# Patient Record
Sex: FEMALE
[De-identification: names, ages, dates, MRNs, and addresses within clinical notes are randomized per-mention and may not be internally consistent; named-entity substitution may affect disease eponyms.]

---

## 2019-05-23 ENCOUNTER — APPOINTMENT (OUTPATIENT)
Dept: GERIATRICS | Facility: CLINIC | Age: 84
End: 2019-05-23
Payer: MEDICARE

## 2019-05-23 VITALS
OXYGEN SATURATION: 97 % | BODY MASS INDEX: 24.03 KG/M2 | TEMPERATURE: 97.9 F | DIASTOLIC BLOOD PRESSURE: 70 MMHG | SYSTOLIC BLOOD PRESSURE: 120 MMHG | WEIGHT: 122.38 LBS | HEIGHT: 60 IN | RESPIRATION RATE: 16 BRPM | HEART RATE: 71 BPM

## 2019-05-23 DIAGNOSIS — R05 COUGH: ICD-10-CM

## 2019-05-23 DIAGNOSIS — Z80.9 FAMILY HISTORY OF MALIGNANT NEOPLASM, UNSPECIFIED: ICD-10-CM

## 2019-05-23 DIAGNOSIS — R29.6 REPEATED FALLS: ICD-10-CM

## 2019-05-23 DIAGNOSIS — Z86.39 PERSONAL HISTORY OF OTHER ENDOCRINE, NUTRITIONAL AND METABOLIC DISEASE: ICD-10-CM

## 2019-05-23 DIAGNOSIS — Z60.2 PROBLEMS RELATED TO LIVING ALONE: ICD-10-CM

## 2019-05-23 DIAGNOSIS — Z83.1 FAMILY HISTORY OF OTHER INFECTIOUS AND PARASITIC DISEASES: ICD-10-CM

## 2019-05-23 DIAGNOSIS — Z95.1 PRESENCE OF AORTOCORONARY BYPASS GRAFT: ICD-10-CM

## 2019-05-23 DIAGNOSIS — R55 SYNCOPE AND COLLAPSE: ICD-10-CM

## 2019-05-23 DIAGNOSIS — E78.5 HYPERLIPIDEMIA, UNSPECIFIED: ICD-10-CM

## 2019-05-23 PROBLEM — Z00.00 ENCOUNTER FOR PREVENTIVE HEALTH EXAMINATION: Status: ACTIVE | Noted: 2019-05-23

## 2019-05-23 PROCEDURE — 99204 OFFICE O/P NEW MOD 45 MIN: CPT

## 2019-05-23 RX ORDER — CHLORHEXIDINE GLUCONATE 4 %
325 (65 FE) LIQUID (ML) TOPICAL DAILY
Refills: 0 | Status: ACTIVE | COMMUNITY
Start: 2019-05-23

## 2019-05-23 SDOH — SOCIAL STABILITY - SOCIAL INSECURITY: PROBLEMS RELATED TO LIVING ALONE: Z60.2

## 2019-05-23 NOTE — SOCIAL HISTORY
[Two or more falls in past year] : Patient reported two or more falls in the past year [Fully functional (bathing, dressing, toileting, transferring, walking, feeding)] : Fully functional (bathing, dressing, toileting, transferring, walking, feeding) [Some assistance needed] : managing medications [Full assistance needed] : managing finances

## 2019-05-23 NOTE — HISTORY OF PRESENT ILLNESS
[FreeTextEntry1] : 89yoF presents with her dtr for initial visit.  Dtr reports increased lower ext edema with weeping of fluid.  she does not always her compression stockings.  currently dtr is providing dressing changes.  also report +productive cough x 4 days now taking cough medication, and tylenol extra strength sinus cold.. denies sore throat, fever or sob,   but endorses +rhinorrhea.  using her ventolin for cough.\par anemia: has been on iron for a very long time.\par frequent falls: using walker and wheelchair.\par spinal stenosis with chronic pain and usually wears neck brace.\par CAD with CABG and congestive heart failure.

## 2019-05-23 NOTE — REASON FOR VISIT
[Initial Evaluation] : an initial evaluation [Family Member] : family member [FreeTextEntry1] : edema leaking

## 2019-05-23 NOTE — ASSESSMENT
[FreeTextEntry1] : chf: volume overloaded with increase in lower ext edema, now with weeping wounds.  dtr will continue to provided daily to 2x daily dressing changes.  will double lasix to 40mg daily x 7 days.  recheck labwork today \par f/u K, remains currently on kcl 20meq daily.   leg elevation and compression/ace wraps.  f/u in 1 week\par cough:  likely viral URI.  improving.  stop tyelnol sinus cold.  advised to take tylenol extra strength and robitussin cough plain.  continue to monitor.  \par anemia:  f/u levels and iron studies.\par CAD s/p cabg:  c/w lowered dose of verapamil 120mg daily and statin.  she is allergic to asa.\par asthma:  mild intermittent:  c/w prn ventolin\par spinal stenosis: wears soft neck brace.  prn tyelnol and will further discuss cyclobenzaprine at next visit.\par unsteady gait: c/w walker/wheelchair\par hx of dm2: used to take insulin.  has peripheral neuropathy.  Now diet controlled\par referral to podiatry\par memory loss:  check vitamin levels and tsh \par next visit MMSE, ACP, depression screen\par

## 2019-05-23 NOTE — PHYSICAL EXAM
[General Appearance - Alert] : alert [General Appearance - In No Acute Distress] : in no acute distress [Sclera] : the sclera and conjunctiva were normal [Extraocular Movements] : extraocular movements were intact [No Oral Pallor] : no oral pallor [No Oral Cyanosis] : no oral cyanosis [Hearing Threshold Finger Rub Not Luna] : hearing was normal [Oropharynx] : The oropharynx was normal [Neck Appearance] : the appearance of the neck was normal [Neck Cervical Mass (___cm)] : no neck mass was observed [Respiration, Rhythm And Depth] : normal respiratory rhythm and effort [Exaggerated Use Of Accessory Muscles For Inspiration] : no accessory muscle use [Auscultation Breath Sounds / Voice Sounds] : lungs were clear to auscultation bilaterally [Heart Rate And Rhythm] : heart rate was normal and rhythm regular [Heart Sounds] : normal S1 and S2 [Breast Appearance] : normal in appearance [Bowel Sounds] : normal bowel sounds [Abdomen Soft] : soft [Abdomen Tenderness] : non-tender [No Spinal Tenderness] : no spinal tenderness [Nail Clubbing] : no clubbing  or cyanosis of the fingernails [Involuntary Movements] : no involuntary movements were seen [Skin Color & Pigmentation] : normal skin color and pigmentation [] : no rash [No Focal Deficits] : no focal deficits [Affect] : the affect was normal [Mood] : the mood was normal [FreeTextEntry1] : 2 open small wounds on the lower legs weeping serous fluid

## 2019-05-23 NOTE — REVIEW OF SYSTEMS
[Eyesight Problems] : eyesight problems [Lower Ext Edema] : lower extremity edema [As Noted in HPI] : as noted in HPI [Incontinence] : incontinence [Dizziness] : dizziness [Negative] : Integumentary [Fever] : no fever [Chills] : no chills [Loss Of Hearing] : no hearing loss [Constipation] : no constipation [Diarrhea] : no diarrhea [Sleep Disturbances] : no sleep disturbances [Anxiety] : no anxiety [Depression] : no depression [Easy Bleeding] : no tendency for easy bleeding [Easy Bruising] : no tendency for easy bruising [FreeTextEntry9] : spinal stenosis with enck pain

## 2019-05-24 LAB
ALBUMIN SERPL ELPH-MCNC: 3.1 G/DL
ALP BLD-CCNC: 83 U/L
ALT SERPL-CCNC: 12 U/L
ANION GAP SERPL CALC-SCNC: 9 MMOL/L
AST SERPL-CCNC: 11 U/L
BASOPHILS # BLD AUTO: 0.05 K/UL
BASOPHILS NFR BLD AUTO: 0.5 %
BILIRUB SERPL-MCNC: <0.2 MG/DL
BUN SERPL-MCNC: 27 MG/DL
CALCIUM SERPL-MCNC: 8.9 MG/DL
CHLORIDE SERPL-SCNC: 111 MMOL/L
CO2 SERPL-SCNC: 22 MMOL/L
CREAT SERPL-MCNC: 0.78 MG/DL
EOSINOPHIL # BLD AUTO: 0.18 K/UL
EOSINOPHIL NFR BLD AUTO: 1.9 %
ESTIMATED AVERAGE GLUCOSE: 94 MG/DL
FERRITIN SERPL-MCNC: 27 NG/ML
FOLATE SERPL-MCNC: 11 NG/ML
GLUCOSE SERPL-MCNC: 130 MG/DL
HBA1C MFR BLD HPLC: 4.9 %
HCT VFR BLD CALC: 29.7 %
HGB BLD-MCNC: 8.5 G/DL
IMM GRANULOCYTES NFR BLD AUTO: 0.5 %
IRON SATN MFR SERPL: 9 %
IRON SERPL-MCNC: 21 UG/DL
LYMPHOCYTES # BLD AUTO: 0.71 K/UL
LYMPHOCYTES NFR BLD AUTO: 7.6 %
MAGNESIUM SERPL-MCNC: 2.3 MG/DL
MAN DIFF?: NORMAL
MCHC RBC-ENTMCNC: 28.3 PG
MCHC RBC-ENTMCNC: 28.6 GM/DL
MCV RBC AUTO: 99 FL
MONOCYTES # BLD AUTO: 0.76 K/UL
MONOCYTES NFR BLD AUTO: 8.1 %
NEUTROPHILS # BLD AUTO: 7.61 K/UL
NEUTROPHILS NFR BLD AUTO: 81.4 %
PLATELET # BLD AUTO: 426 K/UL
POTASSIUM SERPL-SCNC: 4.8 MMOL/L
PROT SERPL-MCNC: 5.4 G/DL
RBC # BLD: 3 M/UL
RBC # FLD: 16.6 %
SODIUM SERPL-SCNC: 142 MMOL/L
TIBC SERPL-MCNC: 233 UG/DL
TSH SERPL-ACNC: 2.04 UIU/ML
UIBC SERPL-MCNC: 212 UG/DL
VIT B12 SERPL-MCNC: 436 PG/ML
WBC # FLD AUTO: 9.36 K/UL

## 2019-05-29 ENCOUNTER — APPOINTMENT (OUTPATIENT)
Dept: GERIATRICS | Facility: CLINIC | Age: 84
End: 2019-05-29
Payer: MEDICARE

## 2019-05-29 VITALS
HEIGHT: 60 IN | BODY MASS INDEX: 23.63 KG/M2 | SYSTOLIC BLOOD PRESSURE: 110 MMHG | WEIGHT: 120.38 LBS | OXYGEN SATURATION: 97 % | RESPIRATION RATE: 15 BRPM | DIASTOLIC BLOOD PRESSURE: 70 MMHG | TEMPERATURE: 97.8 F | HEART RATE: 75 BPM

## 2019-05-29 PROCEDURE — 99214 OFFICE O/P EST MOD 30 MIN: CPT

## 2019-05-29 NOTE — ASSESSMENT
[FreeTextEntry1] : acp:  hcp and molst completed, DNR/DNI, yuni bring in copy at next visit:  Her dtr Evy Tolentinosmileyjacob [phone] is listed.\par chf:  cmp reviewed and stable. leg edema slightly improved, now longer weeping, compression stockings. return to lasix 20mg daily, c/w potassium supplement.  leg elevation and compression/ace wraps. \par cough: trial of zyrtec for possible allergy; continue to monitor. \par anemia: c/w iron supplement\par CAD s/p cabg: c/w lowered dose of verapamil 120mg daily and statin. she is allergic to asa.\par asthma: mild intermittent: c/w prn ventolin.  \par spinal stenosis: wears soft neck brace. prn tyelnol and very rarely takes cyclobenzaprine, would consider stopping in future for beers criteria\par unsteady gait: c/w walker/wheelchair\par hx of dm2: used to take insulin. has peripheral neuropathy. Now diet controlled\par memory loss: tsh wnl.  mmse: 14/29.  however patient with minimal effort for serial 7's and world. suspect falsely low due to this.  will repeat in 6 months.

## 2019-05-29 NOTE — REVIEW OF SYSTEMS
[Eyesight Problems] : eyesight problems [As Noted in HPI] : as noted in HPI [Loss Of Hearing] : hearing loss [Negative] : Cardiovascular

## 2019-05-29 NOTE — PHYSICAL EXAM
[Total Score ___ / 30] : the patient achieved a score of [unfilled] /30 [Date / Time ___ / 5] : date / time [unfilled] / 5 [Place ___ / 5] : place [unfilled] / 5 [Registration ___ / 3] : registration [unfilled] / 3 [Serial Sevens ___/5] : serial sevens [unfilled] / 5 [Naming 2 Objects ___ / 2] : naming two objects [unfilled] / 2 [Repeating a Sentence ___ / 1] : repeating a sentence [unfilled] / 1 [Writing a Sentence ___ / 1] : write sentence [unfilled] / 1 [3-stage Verbal Command ___ / 3] : three-stage verbal command [unfilled] / 3 [Written Command ___ / 1] : written command [unfilled] / 1 [Copy a Design ___ / 1] : copy a design [unfilled] / 1 [Recall ___ / 3] : recall [unfilled] / 3 [General Appearance - Alert] : alert [Sclera] : the sclera and conjunctiva were normal [General Appearance - In No Acute Distress] : in no acute distress [Extraocular Movements] : extraocular movements were intact [No Oral Cyanosis] : no oral cyanosis [Neck Appearance] : the appearance of the neck was normal [Exaggerated Use Of Accessory Muscles For Inspiration] : no accessory muscle use [] : no respiratory distress [Respiration, Rhythm And Depth] : normal respiratory rhythm and effort [Auscultation Breath Sounds / Voice Sounds] : lungs were clear to auscultation bilaterally [Nail Clubbing] : no clubbing  or cyanosis of the fingernails [Involuntary Movements] : no involuntary movements were seen [Skin Color & Pigmentation] : normal skin color and pigmentation [FreeTextEntry1] : small wound healing without drainage or bleeding

## 2019-05-29 NOTE — HISTORY OF PRESENT ILLNESS
[0] : 2) Feeling down, depressed, or hopeless: Not at all [FreeTextEntry1] : seen for follow up with her dtr for her lower ext edema\par \par with doubling of lasix last week, reports edema has improved, and now leg wounds no longer weeping.  and the two small wounds have developed scabs.  no pain or redness. wears compression stockings and elevates legs.\par \par remains with some coughing and rhinorrhea.  but no progression. no sob\par \par bloodwork from last week reviewed, normal electrolytes.  \par \par

## 2019-06-24 ENCOUNTER — INPATIENT (INPATIENT)
Facility: HOSPITAL | Age: 84
LOS: 3 days | Discharge: NURSING, REHAB OR LONG-TERM CARE | DRG: 481 | End: 2019-06-28
Attending: ORTHOPAEDIC SURGERY | Admitting: ORTHOPAEDIC SURGERY
Payer: MEDICARE

## 2019-06-24 VITALS
DIASTOLIC BLOOD PRESSURE: 71 MMHG | OXYGEN SATURATION: 95 % | HEART RATE: 73 BPM | TEMPERATURE: 98 F | SYSTOLIC BLOOD PRESSURE: 157 MMHG | HEIGHT: 69 IN | WEIGHT: 121.92 LBS | RESPIRATION RATE: 18 BRPM

## 2019-06-24 DIAGNOSIS — S72.90XA UNSPECIFIED FRACTURE OF UNSPECIFIED FEMUR, INITIAL ENCOUNTER FOR CLOSED FRACTURE: ICD-10-CM

## 2019-06-24 LAB
ALBUMIN SERPL ELPH-MCNC: 3.5 G/DL — SIGNIFICANT CHANGE UP (ref 3.3–5)
ALP SERPL-CCNC: 81 U/L — SIGNIFICANT CHANGE UP (ref 40–120)
ALT FLD-CCNC: 14 U/L — SIGNIFICANT CHANGE UP (ref 10–45)
ANION GAP SERPL CALC-SCNC: 13 MMOL/L — SIGNIFICANT CHANGE UP (ref 5–17)
APTT BLD: 24.8 SEC — LOW (ref 27.5–36.3)
AST SERPL-CCNC: 15 U/L — SIGNIFICANT CHANGE UP (ref 10–40)
BASOPHILS # BLD AUTO: 0 K/UL — SIGNIFICANT CHANGE UP (ref 0–0.2)
BASOPHILS NFR BLD AUTO: 0.2 % — SIGNIFICANT CHANGE UP (ref 0–2)
BILIRUB SERPL-MCNC: 0.2 MG/DL — SIGNIFICANT CHANGE UP (ref 0.2–1.2)
BLD GP AB SCN SERPL QL: NEGATIVE — SIGNIFICANT CHANGE UP
BUN SERPL-MCNC: 30 MG/DL — HIGH (ref 7–23)
CALCIUM SERPL-MCNC: 9.1 MG/DL — SIGNIFICANT CHANGE UP (ref 8.4–10.5)
CHLORIDE SERPL-SCNC: 107 MMOL/L — SIGNIFICANT CHANGE UP (ref 96–108)
CO2 SERPL-SCNC: 21 MMOL/L — LOW (ref 22–31)
CREAT SERPL-MCNC: 0.82 MG/DL — SIGNIFICANT CHANGE UP (ref 0.5–1.3)
EOSINOPHIL # BLD AUTO: 0.2 K/UL — SIGNIFICANT CHANGE UP (ref 0–0.5)
EOSINOPHIL NFR BLD AUTO: 2.5 % — SIGNIFICANT CHANGE UP (ref 0–6)
GLUCOSE SERPL-MCNC: 159 MG/DL — HIGH (ref 70–99)
HCT VFR BLD CALC: 30.3 % — LOW (ref 34.5–45)
HGB BLD-MCNC: 9.6 G/DL — LOW (ref 11.5–15.5)
INR BLD: 0.95 RATIO — SIGNIFICANT CHANGE UP (ref 0.88–1.16)
LYMPHOCYTES # BLD AUTO: 0.8 K/UL — LOW (ref 1–3.3)
LYMPHOCYTES # BLD AUTO: 8.5 % — LOW (ref 13–44)
MCHC RBC-ENTMCNC: 28.5 PG — SIGNIFICANT CHANGE UP (ref 27–34)
MCHC RBC-ENTMCNC: 31.6 GM/DL — LOW (ref 32–36)
MCV RBC AUTO: 90.4 FL — SIGNIFICANT CHANGE UP (ref 80–100)
MONOCYTES # BLD AUTO: 0.9 K/UL — SIGNIFICANT CHANGE UP (ref 0–0.9)
MONOCYTES NFR BLD AUTO: 9.7 % — SIGNIFICANT CHANGE UP (ref 2–14)
NEUTROPHILS # BLD AUTO: 7.6 K/UL — HIGH (ref 1.8–7.4)
NEUTROPHILS NFR BLD AUTO: 79.1 % — HIGH (ref 43–77)
PLATELET # BLD AUTO: 281 K/UL — SIGNIFICANT CHANGE UP (ref 150–400)
POTASSIUM SERPL-MCNC: 3.9 MMOL/L — SIGNIFICANT CHANGE UP (ref 3.5–5.3)
POTASSIUM SERPL-SCNC: 3.9 MMOL/L — SIGNIFICANT CHANGE UP (ref 3.5–5.3)
PROT SERPL-MCNC: 5.8 G/DL — LOW (ref 6–8.3)
PROTHROM AB SERPL-ACNC: 10.9 SEC — SIGNIFICANT CHANGE UP (ref 10–12.9)
RBC # BLD: 3.35 M/UL — LOW (ref 3.8–5.2)
RBC # FLD: 14.4 % — SIGNIFICANT CHANGE UP (ref 10.3–14.5)
RH IG SCN BLD-IMP: POSITIVE — SIGNIFICANT CHANGE UP
SODIUM SERPL-SCNC: 141 MMOL/L — SIGNIFICANT CHANGE UP (ref 135–145)
WBC # BLD: 9.6 K/UL — SIGNIFICANT CHANGE UP (ref 3.8–10.5)
WBC # FLD AUTO: 9.6 K/UL — SIGNIFICANT CHANGE UP (ref 3.8–10.5)

## 2019-06-24 PROCEDURE — 72125 CT NECK SPINE W/O DYE: CPT | Mod: 26

## 2019-06-24 PROCEDURE — 73552 X-RAY EXAM OF FEMUR 2/>: CPT | Mod: 26,RT

## 2019-06-24 PROCEDURE — 72192 CT PELVIS W/O DYE: CPT | Mod: 26

## 2019-06-24 PROCEDURE — 71045 X-RAY EXAM CHEST 1 VIEW: CPT | Mod: 26

## 2019-06-24 PROCEDURE — 73080 X-RAY EXAM OF ELBOW: CPT | Mod: 26,RT

## 2019-06-24 PROCEDURE — 73502 X-RAY EXAM HIP UNI 2-3 VIEWS: CPT | Mod: 26,RT

## 2019-06-24 PROCEDURE — 99285 EMERGENCY DEPT VISIT HI MDM: CPT

## 2019-06-24 PROCEDURE — 76377 3D RENDER W/INTRP POSTPROCES: CPT | Mod: 26

## 2019-06-24 PROCEDURE — 70450 CT HEAD/BRAIN W/O DYE: CPT | Mod: 26

## 2019-06-24 RX ORDER — VERAPAMIL HCL 240 MG
120 CAPSULE, EXTENDED RELEASE PELLETS 24 HR ORAL DAILY
Refills: 0 | Status: DISCONTINUED | OUTPATIENT
Start: 2019-06-24 | End: 2019-06-28

## 2019-06-24 RX ORDER — ACETAMINOPHEN 500 MG
650 TABLET ORAL EVERY 6 HOURS
Refills: 0 | Status: DISCONTINUED | OUTPATIENT
Start: 2019-06-24 | End: 2019-06-24

## 2019-06-24 RX ORDER — ALBUTEROL 90 UG/1
2.5 AEROSOL, METERED ORAL EVERY 6 HOURS
Refills: 0 | Status: DISCONTINUED | OUTPATIENT
Start: 2019-06-24 | End: 2019-06-25

## 2019-06-24 RX ORDER — OXYCODONE HYDROCHLORIDE 5 MG/1
2.5 TABLET ORAL EVERY 4 HOURS
Refills: 0 | Status: DISCONTINUED | OUTPATIENT
Start: 2019-06-24 | End: 2019-06-28

## 2019-06-24 RX ORDER — SENNA PLUS 8.6 MG/1
2 TABLET ORAL AT BEDTIME
Refills: 0 | Status: DISCONTINUED | OUTPATIENT
Start: 2019-06-24 | End: 2019-06-28

## 2019-06-24 RX ORDER — OXYCODONE HYDROCHLORIDE 5 MG/1
5 TABLET ORAL EVERY 4 HOURS
Refills: 0 | Status: DISCONTINUED | OUTPATIENT
Start: 2019-06-24 | End: 2019-06-28

## 2019-06-24 RX ORDER — ACETAMINOPHEN 500 MG
975 TABLET ORAL EVERY 8 HOURS
Refills: 0 | Status: DISCONTINUED | OUTPATIENT
Start: 2019-06-24 | End: 2019-06-28

## 2019-06-24 RX ORDER — MAGNESIUM HYDROXIDE 400 MG/1
30 TABLET, CHEWABLE ORAL DAILY
Refills: 0 | Status: DISCONTINUED | OUTPATIENT
Start: 2019-06-24 | End: 2019-06-28

## 2019-06-24 RX ORDER — ALBUTEROL 90 UG/1
2.5 AEROSOL, METERED ORAL EVERY 6 HOURS
Refills: 0 | Status: DISCONTINUED | OUTPATIENT
Start: 2019-06-24 | End: 2019-06-24

## 2019-06-24 RX ORDER — DEXTROSE MONOHYDRATE, SODIUM CHLORIDE, AND POTASSIUM CHLORIDE 50; .745; 4.5 G/1000ML; G/1000ML; G/1000ML
1000 INJECTION, SOLUTION INTRAVENOUS
Refills: 0 | Status: DISCONTINUED | OUTPATIENT
Start: 2019-06-24 | End: 2019-06-25

## 2019-06-24 RX ORDER — DOCUSATE SODIUM 100 MG
100 CAPSULE ORAL THREE TIMES A DAY
Refills: 0 | Status: DISCONTINUED | OUTPATIENT
Start: 2019-06-24 | End: 2019-06-28

## 2019-06-24 RX ORDER — VERAPAMIL HCL 240 MG
120 CAPSULE, EXTENDED RELEASE PELLETS 24 HR ORAL DAILY
Refills: 0 | Status: DISCONTINUED | OUTPATIENT
Start: 2019-06-24 | End: 2019-06-24

## 2019-06-24 RX ORDER — FUROSEMIDE 40 MG
20 TABLET ORAL
Refills: 0 | Status: DISCONTINUED | OUTPATIENT
Start: 2019-06-24 | End: 2019-06-25

## 2019-06-24 RX ORDER — ATORVASTATIN CALCIUM 80 MG/1
40 TABLET, FILM COATED ORAL AT BEDTIME
Refills: 0 | Status: DISCONTINUED | OUTPATIENT
Start: 2019-06-24 | End: 2019-06-25

## 2019-06-24 RX ORDER — MORPHINE SULFATE 50 MG/1
1 CAPSULE, EXTENDED RELEASE ORAL ONCE
Refills: 0 | Status: DISCONTINUED | OUTPATIENT
Start: 2019-06-24 | End: 2019-06-24

## 2019-06-24 RX ADMIN — Medication 650 MILLIGRAM(S): at 21:57

## 2019-06-24 RX ADMIN — MORPHINE SULFATE 1 MILLIGRAM(S): 50 CAPSULE, EXTENDED RELEASE ORAL at 21:58

## 2019-06-24 RX ADMIN — MORPHINE SULFATE 1 MILLIGRAM(S): 50 CAPSULE, EXTENDED RELEASE ORAL at 21:16

## 2019-06-24 NOTE — ED ADULT NURSE NOTE - NSIMPLEMENTINTERV_GEN_ALL_ED
Implemented All Fall with Harm Risk Interventions:  [place] to call system. Call bell, personal items and telephone within reach. Instruct patient to call for assistance. Room bathroom lighting operational. Non-slip footwear when patient is off stretcher. Physically safe environment: no spills, clutter or unnecessary equipment. Stretcher in lowest position, wheels locked, appropriate side rails in place. Provide visual cue, wrist band, yellow gown, etc. Monitor gait and stability. Monitor for mental status changes and reorient to person, place, and time. Review medications for side effects contributing to fall risk. Reinforce activity limits and safety measures with patient and family. Provide visual clues: red socks.

## 2019-06-24 NOTE — ED PROVIDER NOTE - CLINICAL SUMMARY MEDICAL DECISION MAKING FREE TEXT BOX
Attending Odessa Baker: 89 y/o female presenting after mechanical fall with right hip pain. on exam pt with ecchymoses to forehead and right hip pain. ct head performed which was negative for acute traumatic injury. pt with abrasion to right elbow without evidence of laceration. will provide wound care. concern for hip fracture based on pain and exam. xrays ordered showing right intertrochanteric fracture. neurovascular intact. will d/w ortho and likely admit. pain control

## 2019-06-24 NOTE — ED PROVIDER NOTE - ATTENDING CONTRIBUTION TO CARE
Attending MD Odessa Baker:  I personally have seen and examined this patient.  Resident note reviewed and agree on plan of care and except where noted.  See HPI, PE, and MDM for details.

## 2019-06-24 NOTE — H&P ADULT - ASSESSMENT
89y Female with R intertroch fracture  - Pain control  - NPO/IVF  - CBC/BMP/Coags/UA/T+S x2  - EKG/CXR  - Medical clearance  - Plan for OR for ORIF R hip

## 2019-06-24 NOTE — ED ADULT NURSE NOTE - OBJECTIVE STATEMENT
89 yr old female has been having multiple falls and today she fell again after stating her legs didn't feel right all day. r leg is shortened and rotated. on assessment a and o x 3 lungs clear abd soft non tender some swelling in both legs but this is normal for her and on water pills for it. has a bruise on forehead and a skin tear on r forearm.

## 2019-06-24 NOTE — ED PROVIDER NOTE - CARE PLAN
Principal Discharge DX:	Femur fracture Principal Discharge DX:	Femur fracture  Goal:	right intertrochanteric fracture

## 2019-06-24 NOTE — ED PROVIDER NOTE - PHYSICAL EXAMINATION
Attending Odessa Baker: Gen: NAD, heent: ecchymoses to forhead eomi, perrla, mmm, op pink, uvula midline, neck; nttp, no nuchal rigidity, chest: nttp, no crepitus, cv: rrr, +murmur, lungs: ctab, abd: soft, nontender, nondistended, no peritoneal signs, +BS, no guarding, ext: ttp right hip, skin:abrasion to right elbow, neuro: awake and alert, following commands, speech clear, sensation and strength intact, no focal deficits

## 2019-06-24 NOTE — ED ADULT NURSE REASSESSMENT NOTE - NS ED NURSE REASSESS COMMENT FT1
Report received from Cielo MELENDREZ . Pt AAOx4, NAD, resp nonlabored, skin warm/dry, resting comfortably in bed with family at bedside. Pt. has no complaints at this time. Pt denies headache, dizziness, chest pain, palpitations, SOB, abd pain, n/v/d, urinary symptoms, fevers, chills, weakness at this time. Currently admitted awaiting inpatient bed placement. Patient and family aware. Safety and comfort maintained.

## 2019-06-24 NOTE — H&P ADULT - HISTORY OF PRESENT ILLNESS
89y Female presents to [facility] ED s/p [facility] fall c/o severe R hip pain and inability to ambulate.  Patient denies headstrike or LOC. Localizes pain to R hip/femur. Patient denies radiation of pain. Patient denies numbness/tingling/burning in the RLE. No other bone/joint complaints. Patient is a community ambulator at baseline with assistive devices. Patient has no issues w/ ADLs/IADLs.     PAST MEDICAL & SURGICAL HISTORY:    MEDICATIONS  (STANDING):  acetaminophen   Tablet .. 975 milliGRAM(s) Oral every 8 hours  atorvastatin 40 milliGRAM(s) Oral at bedtime  dextrose 5% + sodium chloride 0.45% with potassium chloride 20 mEq/L 1000 milliLiter(s) (100 mL/Hr) IV Continuous <Continuous>  docusate sodium 100 milliGRAM(s) Oral three times a day  furosemide    Tablet 20 milliGRAM(s) Oral two times a day  senna 2 Tablet(s) Oral at bedtime  verapamil 120 milliGRAM(s) Oral daily    MEDICATIONS  (PRN):  ALBUTerol   0.5% 2.5 milliGRAM(s) Nebulizer every 6 hours PRN Shortness of Breath and/or Wheezing  magnesium hydroxide Suspension 30 milliLiter(s) Oral daily PRN Constipation  oxyCODONE    IR 2.5 milliGRAM(s) Oral every 4 hours PRN Mild and Moderate  oxyCODONE    IR 5 milliGRAM(s) Oral every 4 hours PRN Severe Pain (7 - 10)    Allergies    No Known Allergies    Intolerances        T(C): 36.5 (06-24-19 @ 21:10), Max: 36.8 (06-24-19 @ 18:28)  HR: 71 (06-24-19 @ 21:10) (71 - 73)  BP: 146/51 (06-24-19 @ 21:10) (146/51 - 157/71)  RR: 18 (06-24-19 @ 21:10) (18 - 18)  SpO2: 100% (06-24-19 @ 21:10) (95% - 100%)  Wt(kg): --    PE   RLE:  Skin intact; No ecchymosis/soft tissue swelling  Compartments soft; + TTP about hip. No TTP to knee/leg/ankle/foot   ROM lmited 2/2 pain   Unable to SLR; + Log Roll/Heel Strike  Motor intact GS/TA/FHL/EHL  SILT L2-S1  DP/PT pulses 2+    LLE/BUE:   No bony TTP; Good ROM w/o pain; Exam Unremarkable    Imaging:  XR demonstrating R femoral intertroch fracture

## 2019-06-25 DIAGNOSIS — R60.0 LOCALIZED EDEMA: ICD-10-CM

## 2019-06-25 DIAGNOSIS — Z01.818 ENCOUNTER FOR OTHER PREPROCEDURAL EXAMINATION: ICD-10-CM

## 2019-06-25 LAB
ANION GAP SERPL CALC-SCNC: 10 MMOL/L — SIGNIFICANT CHANGE UP (ref 5–17)
APTT BLD: 25.7 SEC — LOW (ref 27.5–36.3)
BLD GP AB SCN SERPL QL: NEGATIVE — SIGNIFICANT CHANGE UP
BUN SERPL-MCNC: 22 MG/DL — SIGNIFICANT CHANGE UP (ref 7–23)
CALCIUM SERPL-MCNC: 8.4 MG/DL — SIGNIFICANT CHANGE UP (ref 8.4–10.5)
CHLORIDE SERPL-SCNC: 107 MMOL/L — SIGNIFICANT CHANGE UP (ref 96–108)
CO2 SERPL-SCNC: 22 MMOL/L — SIGNIFICANT CHANGE UP (ref 22–31)
CREAT SERPL-MCNC: 0.72 MG/DL — SIGNIFICANT CHANGE UP (ref 0.5–1.3)
GLUCOSE SERPL-MCNC: 161 MG/DL — HIGH (ref 70–99)
HCT VFR BLD CALC: 27.7 % — LOW (ref 34.5–45)
HGB BLD-MCNC: 8.7 G/DL — LOW (ref 11.5–15.5)
INR BLD: 0.98 RATIO — SIGNIFICANT CHANGE UP (ref 0.88–1.16)
MCHC RBC-ENTMCNC: 28.2 PG — SIGNIFICANT CHANGE UP (ref 27–34)
MCHC RBC-ENTMCNC: 31.4 GM/DL — LOW (ref 32–36)
MCV RBC AUTO: 89.9 FL — SIGNIFICANT CHANGE UP (ref 80–100)
NT-PROBNP SERPL-SCNC: 613 PG/ML — HIGH (ref 0–300)
PLATELET # BLD AUTO: 237 K/UL — SIGNIFICANT CHANGE UP (ref 150–400)
POTASSIUM SERPL-MCNC: 3.9 MMOL/L — SIGNIFICANT CHANGE UP (ref 3.5–5.3)
POTASSIUM SERPL-SCNC: 3.9 MMOL/L — SIGNIFICANT CHANGE UP (ref 3.5–5.3)
PROTHROM AB SERPL-ACNC: 11.3 SEC — SIGNIFICANT CHANGE UP (ref 10–12.9)
RBC # BLD: 3.08 M/UL — LOW (ref 3.8–5.2)
RBC # FLD: 14.5 % — SIGNIFICANT CHANGE UP (ref 10.3–14.5)
RH IG SCN BLD-IMP: POSITIVE — SIGNIFICANT CHANGE UP
SODIUM SERPL-SCNC: 139 MMOL/L — SIGNIFICANT CHANGE UP (ref 135–145)
WBC # BLD: 7.3 K/UL — SIGNIFICANT CHANGE UP (ref 3.8–10.5)
WBC # FLD AUTO: 7.3 K/UL — SIGNIFICANT CHANGE UP (ref 3.8–10.5)

## 2019-06-25 PROCEDURE — 73552 X-RAY EXAM OF FEMUR 2/>: CPT | Mod: 26,RT

## 2019-06-25 PROCEDURE — 73502 X-RAY EXAM HIP UNI 2-3 VIEWS: CPT | Mod: 26,RT

## 2019-06-25 PROCEDURE — 93306 TTE W/DOPPLER COMPLETE: CPT | Mod: 26

## 2019-06-25 PROCEDURE — 99223 1ST HOSP IP/OBS HIGH 75: CPT | Mod: GC

## 2019-06-25 RX ORDER — CHLORHEXIDINE GLUCONATE 213 G/1000ML
1 SOLUTION TOPICAL DAILY
Refills: 0 | Status: DISCONTINUED | OUTPATIENT
Start: 2019-06-25 | End: 2019-06-28

## 2019-06-25 RX ORDER — SODIUM CHLORIDE 9 MG/ML
1000 INJECTION INTRAMUSCULAR; INTRAVENOUS; SUBCUTANEOUS
Refills: 0 | Status: DISCONTINUED | OUTPATIENT
Start: 2019-06-25 | End: 2019-06-28

## 2019-06-25 RX ORDER — ATORVASTATIN CALCIUM 80 MG/1
40 TABLET, FILM COATED ORAL AT BEDTIME
Refills: 0 | Status: DISCONTINUED | OUTPATIENT
Start: 2019-06-25 | End: 2019-06-28

## 2019-06-25 RX ORDER — HYDROMORPHONE HYDROCHLORIDE 2 MG/ML
0.25 INJECTION INTRAMUSCULAR; INTRAVENOUS; SUBCUTANEOUS
Refills: 0 | Status: DISCONTINUED | OUTPATIENT
Start: 2019-06-25 | End: 2019-06-26

## 2019-06-25 RX ORDER — RIVAROXABAN 15 MG-20MG
10 KIT ORAL DAILY
Refills: 0 | Status: DISCONTINUED | OUTPATIENT
Start: 2019-06-26 | End: 2019-06-28

## 2019-06-25 RX ORDER — ONDANSETRON 8 MG/1
4 TABLET, FILM COATED ORAL ONCE
Refills: 0 | Status: DISCONTINUED | OUTPATIENT
Start: 2019-06-25 | End: 2019-06-26

## 2019-06-25 RX ORDER — FUROSEMIDE 40 MG
20 TABLET ORAL
Refills: 0 | Status: DISCONTINUED | OUTPATIENT
Start: 2019-06-25 | End: 2019-06-28

## 2019-06-25 RX ORDER — DEXTROSE MONOHYDRATE, SODIUM CHLORIDE, AND POTASSIUM CHLORIDE 50; .745; 4.5 G/1000ML; G/1000ML; G/1000ML
1000 INJECTION, SOLUTION INTRAVENOUS
Refills: 0 | Status: DISCONTINUED | OUTPATIENT
Start: 2019-06-25 | End: 2019-06-25

## 2019-06-25 RX ADMIN — Medication 100 MILLIGRAM(S): at 07:07

## 2019-06-25 RX ADMIN — Medication 975 MILLIGRAM(S): at 07:07

## 2019-06-25 RX ADMIN — Medication 975 MILLIGRAM(S): at 13:06

## 2019-06-25 RX ADMIN — ALBUTEROL 2.5 MILLIGRAM(S): 90 AEROSOL, METERED ORAL at 12:23

## 2019-06-25 RX ADMIN — Medication 120 MILLIGRAM(S): at 07:07

## 2019-06-25 RX ADMIN — Medication 975 MILLIGRAM(S): at 07:46

## 2019-06-25 RX ADMIN — CHLORHEXIDINE GLUCONATE 1 APPLICATION(S): 213 SOLUTION TOPICAL at 12:23

## 2019-06-25 RX ADMIN — Medication 20 MILLIGRAM(S): at 07:07

## 2019-06-25 NOTE — ED ADULT NURSE REASSESSMENT NOTE - NS ED NURSE REASSESS COMMENT FT1
[facility]. Report given to PARVIN Carroll. VSS. Pt stable for transport. Chart given to charge desk. Will cont to monitor.

## 2019-06-25 NOTE — PROGRESS NOTE ADULT - SUBJECTIVE AND OBJECTIVE BOX
Patient seen and examined.  No acute events overnight.  Pain well controlled.     Vital Signs Last 24 Hrs  T(C): 36.6 (25 Jun 2019 06:22), Max: 37.1 (25 Jun 2019 00:29)  T(F): 97.8 (25 Jun 2019 06:22), Max: 98.8 (25 Jun 2019 00:29)  HR: 73 (25 Jun 2019 06:22) (71 - 79)  BP: 150/56 (25 Jun 2019 06:22) (140/46 - 157/71)  BP(mean): 80 (24 Jun 2019 21:10) (80 - 80)  RR: 18 (25 Jun 2019 06:22) (18 - 18)  SpO2: 95% (25 Jun 2019 06:22) (95% - 100%)    06-24 @ 07:01  -  06-25 @ 06:39  --------------------------------------------------------  IN: 500 mL / OUT: 0 mL / NET: 500 mL                            8.7    7.3   )-----------( 237      ( 25 Jun 2019 04:33 )             27.7   06-25    139  |  107  |  22  ----------------------------<  161<H>  3.9   |  22  |  0.72    Ca    8.4      25 Jun 2019 04:33    TPro  5.8<L>  /  Alb  3.5  /  TBili  0.2  /  DBili  x   /  AST  15  /  ALT  14  /  AlkPhos  81  06-24      Exam:  Gen: NAD  RLE:  Pain well controlled.   Motor: 5/5 EHL/FHL/TA/Gastrocnemius  Sensory: SILT DP/SP/S/S/T nerve distributions  Vascular: 2+ Dorsalis Pedis pulse

## 2019-06-25 NOTE — CONSULT NOTE ADULT - PROBLEM SELECTOR RECOMMENDATION 9
-the pt has a history of CAD s/p CABG and CHF (unclear whether systolic of diastolic), for which she takes a statin and lasix for symptomatic management for peripheral edema. She also takes verapamil for HTN. Her RCRI for cardiac risk perioperatively is 10.1% for 30-day risk of death, MI, or cardiac arrest.  -recommendations to follow  -would obtain TTE prior -the pt has a history of CAD s/p CABG and CHF (unclear whether systolic of diastolic), for which she takes a statin and lasix for symptomatic management for peripheral edema. She also takes verapamil for HTN. Her RCRI for cardiac risk perioperatively is 10.1% for 30-day risk of death, MI, or cardiac arrest for this intermediate-risk surgery (ORIF).  -recommendations to follow  -would obtain TTE prior -the pt has a history of CAD s/p CABG and CHF (unclear whether systolic of diastolic), for which she takes a statin and lasix for symptomatic management for peripheral edema. She also takes verapamil for HTN. Her RCRI for cardiac risk perioperatively is 10.1% for 30-day risk of death, MI, or cardiac arrest for this intermediate-risk surgery (ORIF).  -recommendations to follow -the pt has a history of CAD s/p CABG and CHF (unclear whether systolic of diastolic), for which she takes a statin and lasix for symptomatic management for peripheral edema. She also takes verapamil for HTN. Her RCRI for cardiac risk perioperatively is 10.1% for 30-day risk of death, MI, or cardiac arrest for this intermediate-risk surgery (ORIF).  -prior to surgery, would have cardiology consult, consider TTE and BNP - may need further diuresis prior to operation -The pt has a history of CAD s/p CABG and CHF (unclear whether systolic of diastolic), for which she takes a statin and lasix for symptomatic management for peripheral edema. She also takes verapamil for HTN. Her RCRI for cardiac risk perioperatively is 10.1% for 30-day risk of death, MI, or cardiac arrest for this intermediate-risk surgery (ORIF). Has some SOB on stairs. Pt has some chronic bilateral LE edema at baseline as well as 2 pillow orthopnea.   -Would consult cardiology now to assess continuation of IVF and if patient needs any diuresis to optimize patient for surgery. Patient without primary cardiologist.  -Would order BNP and TTE for now  -Case discussed to with orthopedics.

## 2019-06-25 NOTE — CONSULT NOTE ADULT - SUBJECTIVE AND OBJECTIVE BOX
MELLO DOMINGO  89y  Female    Patient is a 89y old  Female w/PMHX CAD s/p CABG, CHF who presents with a chief complaint of rt hip pain after mechanical fall. Medicine consulted for preoperative medical evaluation.    The pt has no known adverse reaction to anesthesia. She is allergic to aspirin, unclear effect. She lives at home, and is fully functional with ADLs; needs some assistance with using telephone, preparing meals and managing medications. Patient needs full assistance with shopping, housekeeping, doing laundry, using transportation and managing finances. She can complete 2-4 mets daily depending on how she feels; sometimes feels SOB.     PMHX/PSHX:  CAD, s/p CABG  CHF  HTN  inguinal hernia repair  appendectomy  loop recorder insertion    FHX:  TB: father  malignancy: mother    SHX:  denies hx smoking, ETOH, recreational drug use    Allergies:  aspirin    REVIEW OF SYSTEMS:  Constitutional: denies fevers, chills, sweats  HEENT: denies changes in vision, rhinorrhea, tussis  CV: denies palpitations, CP  Pulm: +occasional SOB  GI: denies abdominal pain, N/V, diarrhea/constipation, hematochezia/melena  : denies dysuria/hematuria  Skin: denies new lesions, +B/L LE swelling  Back/MSK: denies new muscle/joint aches  Neuro/psych: denies neurologic deficits, seizure-like activity, dizziness, +rt hip weakness  Heme/lymph: denies new bruises/bleeds, lymphadenopathy    T(C): 36.7 (06-25-19 @ 01:41), Max: 37.1 (06-25-19 @ 00:29)  HR: 74 (06-25-19 @ 01:41) (71 - 79)  BP: 142/48 (06-25-19 @ 01:41) (140/46 - 157/71)  RR: 18 (06-25-19 @ 01:41) (18 - 18)  SpO2: 97% (06-25-19 @ 01:41) (95% - 100%)  Wt(kg): --Vital Signs Last 24 Hrs  T(C): 36.7 (25 Jun 2019 01:41), Max: 37.1 (25 Jun 2019 00:29)  T(F): 98.1 (25 Jun 2019 01:41), Max: 98.8 (25 Jun 2019 00:29)  HR: 74 (25 Jun 2019 01:41) (71 - 79)  BP: 142/48 (25 Jun 2019 01:41) (140/46 - 157/71)  BP(mean): 80 (24 Jun 2019 21:10) (80 - 80)  RR: 18 (25 Jun 2019 01:41) (18 - 18)  SpO2: 97% (25 Jun 2019 01:41) (95% - 100%)    PHYSICAL EXAM:  GENERAL: elderly female pt, cachectic NAD  HEAD:  Atraumatic, Normocephalic, ecchymosis healing 3-5cm on forehead  EYES: EOMI, PERRLA, conjunctiva and sclera clear  ENMT: No tonsillar erythema, exudates, or enlargement; Moist mucous membranes, poor dentition, No lesions  NECK: Supple, No JVD, Normal thyroid  NERVOUS SYSTEM:  Alert & Oriented X3, Good concentration; Motor Strength 5/5 B/L upper extremities, LE 3/5 strength B/L  CHEST/LUNG: reduced lung sounds B/L  HEART: Regular rate and rhythm; No murmurs, rubs, or gallops  ABDOMEN: Soft, Nontender, Nondistended; Bowel sounds present  EXTREMITIES:  2+ Peripheral Pulses, No clubbing, cyanosis, 1+pitting edema to knees B/L  LYMPH: No lymphadenopathy noted  SKIN: No rashes or lesions      LABS:                        9.6    9.6   )-----------( 281      ( 24 Jun 2019 19:13 )             30.3     06-24    141  |  107  |  30<H>  ----------------------------<  159<H>  3.9   |  21<L>  |  0.82    Ca    9.1      24 Jun 2019 19:13    TPro  5.8<L>  /  Alb  3.5  /  TBili  0.2  /  DBili  x   /  AST  15  /  ALT  14  /  AlkPhos  81  06-24    PT/INR - ( 24 Jun 2019 19:13 )   PT: 10.9 sec;   INR: 0.95 ratio         PTT - ( 24 Jun 2019 19:13 )  PTT:24.8 sec    CAPILLARY BLOOD GLUCOSE    EKG 6/24/19: RBBB, LVH 72,     RADIOLOGY & ADDITIONAL TESTS:  6/24/19 CT pelvis:    INTERPRETATION:  Acute comminuted intertrochanteric right femur fracture   with impaction and a mild apex anterior angulation. Also noted is mild   anterolisthesis of L4 on L5 and L5 on S1. Officialreport to follow.    6/24 CTH and cervical spine:    HEAD CT:   No acute intracranial hemorrhage, brain edema, or masseffect. No   displaced calvarial fracture.      CERVICAL SPINE CT:   No evidence for acute fracture or traumatic malalignment. Degenerative   changes. MELLO DOMINGO  89y  Female    Patient is a 89y old  Female w/PMHX CAD s/p CABG, CHF, DM (A1C 4.9% in 5/2019 not on insulin), mild intermittent asthma, who presents with a chief complaint of rt hip pain after mechanical fall. Medicine consulted for preoperative medical evaluation.    The pt has no known adverse reaction to anesthesia. She is allergic to aspirin, unclear effect. She lives at home, and is fully functional with ADLs; needs some assistance with using telephone, preparing meals and managing medications. Patient needs full assistance with shopping, housekeeping, doing laundry, using transportation and managing finances. She can complete 2-4 mets daily depending on how she feels; sometimes feels SOB.     PMHX/PSHX:  CAD, s/p CABG  CHF  DM  asthma  HTN  inguinal hernia repair  appendectomy  loop recorder insertion    FHX:  TB: father  malignancy: mother    SHX:  denies hx smoking, ETOH, recreational drug use    Allergies:  aspirin    REVIEW OF SYSTEMS:  Constitutional: denies fevers, chills, sweats  HEENT: denies changes in vision, rhinorrhea, tussis  CV: denies palpitations, CP  Pulm: +occasional SOB  GI: denies abdominal pain, N/V, diarrhea/constipation, hematochezia/melena  : denies dysuria/hematuria  Skin: denies new lesions, +B/L LE swelling  Back/MSK: denies new muscle/joint aches  Neuro/psych: denies neurologic deficits, seizure-like activity, dizziness, +rt hip weakness  Heme/lymph: denies new bruises/bleeds, lymphadenopathy    T(C): 36.7 (06-25-19 @ 01:41), Max: 37.1 (06-25-19 @ 00:29)  HR: 74 (06-25-19 @ 01:41) (71 - 79)  BP: 142/48 (06-25-19 @ 01:41) (140/46 - 157/71)  RR: 18 (06-25-19 @ 01:41) (18 - 18)  SpO2: 97% (06-25-19 @ 01:41) (95% - 100%)  Wt(kg): --Vital Signs Last 24 Hrs  T(C): 36.7 (25 Jun 2019 01:41), Max: 37.1 (25 Jun 2019 00:29)  T(F): 98.1 (25 Jun 2019 01:41), Max: 98.8 (25 Jun 2019 00:29)  HR: 74 (25 Jun 2019 01:41) (71 - 79)  BP: 142/48 (25 Jun 2019 01:41) (140/46 - 157/71)  BP(mean): 80 (24 Jun 2019 21:10) (80 - 80)  RR: 18 (25 Jun 2019 01:41) (18 - 18)  SpO2: 97% (25 Jun 2019 01:41) (95% - 100%)    PHYSICAL EXAM:  GENERAL: elderly female pt, cachectic NAD  HEAD:  Atraumatic, Normocephalic, ecchymosis healing 3-5cm on forehead  EYES: EOMI, PERRLA, conjunctiva and sclera clear  ENMT: No tonsillar erythema, exudates, or enlargement; Moist mucous membranes, poor dentition, No lesions  NECK: Supple, No JVD, Normal thyroid  NERVOUS SYSTEM:  Alert & Oriented X3, Good concentration; Motor Strength 5/5 B/L upper extremities, LE 3/5 strength B/L  CHEST/LUNG: reduced lung sounds B/L  HEART: Regular rate and rhythm; No murmurs, rubs, or gallops  ABDOMEN: Soft, Nontender, Nondistended; Bowel sounds present  EXTREMITIES:  2+ Peripheral Pulses, No clubbing, cyanosis, 1+pitting edema to knees B/L  LYMPH: No lymphadenopathy noted  SKIN: No rashes or lesions      LABS:                        9.6    9.6   )-----------( 281      ( 24 Jun 2019 19:13 )             30.3     06-24    141  |  107  |  30<H>  ----------------------------<  159<H>  3.9   |  21<L>  |  0.82    Ca    9.1      24 Jun 2019 19:13    TPro  5.8<L>  /  Alb  3.5  /  TBili  0.2  /  DBili  x   /  AST  15  /  ALT  14  /  AlkPhos  81  06-24    PT/INR - ( 24 Jun 2019 19:13 )   PT: 10.9 sec;   INR: 0.95 ratio         PTT - ( 24 Jun 2019 19:13 )  PTT:24.8 sec    CAPILLARY BLOOD GLUCOSE    EKG 6/24/19: RBBB, LVH 72,     RADIOLOGY & ADDITIONAL TESTS:  6/24/19 CT pelvis:    INTERPRETATION:  Acute comminuted intertrochanteric right femur fracture   with impaction and a mild apex anterior angulation. Also noted is mild   anterolisthesis of L4 on L5 and L5 on S1. Officialreport to follow.    6/24 CTH and cervical spine:    HEAD CT:   No acute intracranial hemorrhage, brain edema, or masseffect. No   displaced calvarial fracture.      CERVICAL SPINE CT:   No evidence for acute fracture or traumatic malalignment. Degenerative   changes. MELLO DOMINGO  89y  Female    Patient is a 89y old  Female w/PMHX CAD s/p CABGx3 >20 years ago, CHF, DM (A1C 4.9% in 5/2019 not on insulin), mild intermittent asthma, who presents with a chief complaint of rt hip pain after mechanical fall. Medicine consulted for preoperative medical evaluation.    The pt has no known adverse reaction to anesthesia. She is allergic to aspirin, unclear effect. She lives at home, and is fully functional with ADLs; needs some assistance with using telephone, preparing meals and managing medications. Patient needs full assistance with shopping, housekeeping, doing laundry, using transportation and managing finances. She can complete 2-4 mets daily depending on how she feels; sometimes feels SOB.     PMHX/PSHX:  CAD, s/p CABG  CHF  DM  asthma  HTN  inguinal hernia repair  appendectomy  loop recorder insertion    FHX:  TB: father  malignancy: mother    SHX:  denies hx smoking, ETOH, recreational drug use    Allergies:  aspirin    REVIEW OF SYSTEMS:  Constitutional: denies fevers, chills, sweats  HEENT: denies changes in vision, rhinorrhea, tussis  CV: denies palpitations, CP  Pulm: +occasional SOB  GI: denies abdominal pain, N/V, diarrhea/constipation, hematochezia/melena  : denies dysuria/hematuria  Skin: denies new lesions, +B/L LE swelling  Back/MSK: denies new muscle/joint aches  Neuro/psych: denies neurologic deficits, seizure-like activity, dizziness, +rt hip weakness  Heme/lymph: denies new bruises/bleeds, lymphadenopathy    T(C): 36.7 (06-25-19 @ 01:41), Max: 37.1 (06-25-19 @ 00:29)  HR: 74 (06-25-19 @ 01:41) (71 - 79)  BP: 142/48 (06-25-19 @ 01:41) (140/46 - 157/71)  RR: 18 (06-25-19 @ 01:41) (18 - 18)  SpO2: 97% (06-25-19 @ 01:41) (95% - 100%)  Wt(kg): --Vital Signs Last 24 Hrs  T(C): 36.7 (25 Jun 2019 01:41), Max: 37.1 (25 Jun 2019 00:29)  T(F): 98.1 (25 Jun 2019 01:41), Max: 98.8 (25 Jun 2019 00:29)  HR: 74 (25 Jun 2019 01:41) (71 - 79)  BP: 142/48 (25 Jun 2019 01:41) (140/46 - 157/71)  BP(mean): 80 (24 Jun 2019 21:10) (80 - 80)  RR: 18 (25 Jun 2019 01:41) (18 - 18)  SpO2: 97% (25 Jun 2019 01:41) (95% - 100%)    PHYSICAL EXAM:  GENERAL: elderly female pt, cachectic NAD  HEAD:  Atraumatic, Normocephalic, ecchymosis healing 3-5cm on forehead  EYES: EOMI, PERRLA, conjunctiva and sclera clear  ENMT: No tonsillar erythema, exudates, or enlargement; Moist mucous membranes, poor dentition, No lesions  NECK: Supple, No JVD, Normal thyroid  NERVOUS SYSTEM:  Alert & Oriented X3, Good concentration; Motor Strength 5/5 B/L upper extremities, LE 3/5 strength B/L  CHEST/LUNG: reduced lung sounds B/L  HEART: Regular rate and rhythm; No murmurs, rubs, or gallops  ABDOMEN: Soft, Nontender, Nondistended; Bowel sounds present  EXTREMITIES:  2+ Peripheral Pulses, No clubbing, cyanosis, 1+pitting edema to knees B/L  LYMPH: No lymphadenopathy noted  SKIN: No rashes or lesions      LABS:                        9.6    9.6   )-----------( 281      ( 24 Jun 2019 19:13 )             30.3     06-24    141  |  107  |  30<H>  ----------------------------<  159<H>  3.9   |  21<L>  |  0.82    Ca    9.1      24 Jun 2019 19:13    TPro  5.8<L>  /  Alb  3.5  /  TBili  0.2  /  DBili  x   /  AST  15  /  ALT  14  /  AlkPhos  81  06-24    PT/INR - ( 24 Jun 2019 19:13 )   PT: 10.9 sec;   INR: 0.95 ratio         PTT - ( 24 Jun 2019 19:13 )  PTT:24.8 sec    CAPILLARY BLOOD GLUCOSE    EKG 6/24/19: RBBB, LVH 72,     RADIOLOGY & ADDITIONAL TESTS:  6/24/19 CT pelvis:    INTERPRETATION:  Acute comminuted intertrochanteric right femur fracture   with impaction and a mild apex anterior angulation. Also noted is mild   anterolisthesis of L4 on L5 and L5 on S1. Officialreport to follow.    6/24 CTH and cervical spine:    HEAD CT:   No acute intracranial hemorrhage, brain edema, or masseffect. No   displaced calvarial fracture.      CERVICAL SPINE CT:   No evidence for acute fracture or traumatic malalignment. Degenerative   changes. MELLO DOMINGO  89y  Female    Patient is a 89y old  Female w/PMHX CAD s/p CABGx3 >20 years ago, CHF, DM (A1C 4.9% in 5/2019 not on insulin), mild intermittent asthma, who presents with a chief complaint of rt hip pain after mechanical fall. Medicine consulted for preoperative medical evaluation for ORIF.    The pt has no known adverse reaction to anesthesia. She is allergic to aspirin, unclear effect. She lives at home, and is fully functional with ADLs; needs some assistance with using telephone, preparing meals and managing medications. Patient needs full assistance with shopping, housekeeping, doing laundry, using transportation and managing finances. She can complete 2-4 mets daily depending on how she feels; sometimes feels SOB.     PMHX/PSHX:  CAD, s/p CABG  CHF  DM  asthma  HTN  inguinal hernia repair  appendectomy  loop recorder insertion    FHX:  TB: father  malignancy: mother    SHX:  denies hx smoking, ETOH, recreational drug use    Allergies:  aspirin    REVIEW OF SYSTEMS:  Constitutional: denies fevers, chills, sweats  HEENT: denies changes in vision, rhinorrhea, tussis  CV: denies palpitations, CP  Pulm: +occasional SOB  GI: denies abdominal pain, N/V, diarrhea/constipation, hematochezia/melena  : denies dysuria/hematuria  Skin: denies new lesions, +B/L LE swelling  Back/MSK: denies new muscle/joint aches  Neuro/psych: denies neurologic deficits, seizure-like activity, dizziness, +rt hip weakness  Heme/lymph: denies new bruises/bleeds, lymphadenopathy    T(C): 36.7 (06-25-19 @ 01:41), Max: 37.1 (06-25-19 @ 00:29)  HR: 74 (06-25-19 @ 01:41) (71 - 79)  BP: 142/48 (06-25-19 @ 01:41) (140/46 - 157/71)  RR: 18 (06-25-19 @ 01:41) (18 - 18)  SpO2: 97% (06-25-19 @ 01:41) (95% - 100%)  Wt(kg): --Vital Signs Last 24 Hrs  T(C): 36.7 (25 Jun 2019 01:41), Max: 37.1 (25 Jun 2019 00:29)  T(F): 98.1 (25 Jun 2019 01:41), Max: 98.8 (25 Jun 2019 00:29)  HR: 74 (25 Jun 2019 01:41) (71 - 79)  BP: 142/48 (25 Jun 2019 01:41) (140/46 - 157/71)  BP(mean): 80 (24 Jun 2019 21:10) (80 - 80)  RR: 18 (25 Jun 2019 01:41) (18 - 18)  SpO2: 97% (25 Jun 2019 01:41) (95% - 100%)    PHYSICAL EXAM:  GENERAL: elderly female pt, cachectic NAD  HEAD:  Atraumatic, Normocephalic, ecchymosis healing 3-5cm on forehead  EYES: EOMI, PERRLA, conjunctiva and sclera clear  ENMT: No tonsillar erythema, exudates, or enlargement; Moist mucous membranes, poor dentition, No lesions  NECK: Supple, No JVD, Normal thyroid  NERVOUS SYSTEM:  Alert & Oriented X3, Good concentration; Motor Strength 5/5 B/L upper extremities, LE 3/5 strength B/L  CHEST/LUNG: reduced lung sounds B/L  HEART: Regular rate and rhythm; No murmurs, rubs, or gallops  ABDOMEN: Soft, Nontender, Nondistended; Bowel sounds present  EXTREMITIES:  2+ Peripheral Pulses, No clubbing, cyanosis, 1+pitting edema to knees B/L  LYMPH: No lymphadenopathy noted  SKIN: No rashes or lesions      LABS:                        9.6    9.6   )-----------( 281      ( 24 Jun 2019 19:13 )             30.3     06-24    141  |  107  |  30<H>  ----------------------------<  159<H>  3.9   |  21<L>  |  0.82    Ca    9.1      24 Jun 2019 19:13    TPro  5.8<L>  /  Alb  3.5  /  TBili  0.2  /  DBili  x   /  AST  15  /  ALT  14  /  AlkPhos  81  06-24    PT/INR - ( 24 Jun 2019 19:13 )   PT: 10.9 sec;   INR: 0.95 ratio         PTT - ( 24 Jun 2019 19:13 )  PTT:24.8 sec    CAPILLARY BLOOD GLUCOSE    EKG 6/24/19: RBBB, LVH 72,     RADIOLOGY & ADDITIONAL TESTS:  6/24/19 CT pelvis:    INTERPRETATION:  Acute comminuted intertrochanteric right femur fracture   with impaction and a mild apex anterior angulation. Also noted is mild   anterolisthesis of L4 on L5 and L5 on S1. Officialreport to follow.    6/24 CTH and cervical spine:    HEAD CT:   No acute intracranial hemorrhage, brain edema, or masseffect. No   displaced calvarial fracture.      CERVICAL SPINE CT:   No evidence for acute fracture or traumatic malalignment. Degenerative   changes. MELLO DOMINGO  89y  Female    Patient is a 89y old  Female w/PMHX CAD s/p CABGx3 >20 years ago, CHF, DM (A1C 4.9% in 5/2019 not on insulin), mild intermittent asthma, who presents with a chief complaint of rt hip pain after mechanical fall. Medicine consulted for preoperative medical evaluation for ORIF.    The pt has no known adverse reaction to anesthesia. She is allergic to aspirin, unclear effect. She lives at home, and is fully functional with ADLs; needs some assistance with using telephone, preparing meals and managing medications. Patient needs full assistance with shopping, housekeeping, doing laundry, using transportation and managing finances. She can complete 2-4 mets daily depending on how she feels; sometimes feels SOB and has had LE swelling in the past few weeks.    PMHX/PSHX:  CAD, s/p CABG  CHF  DM  asthma  HTN  inguinal hernia repair  appendectomy  loop recorder insertion    FHX:  TB: father  malignancy: mother    SHX:  denies hx smoking, ETOH, recreational drug use    Allergies:  aspirin    REVIEW OF SYSTEMS:  Constitutional: denies fevers, chills, sweats  HEENT: denies changes in vision, rhinorrhea, tussis  CV: denies palpitations, CP  Pulm: +occasional SOB  GI: denies abdominal pain, N/V, diarrhea/constipation, hematochezia/melena  : denies dysuria/hematuria  Skin: denies new lesions, +B/L LE swelling  Back/MSK: denies new muscle/joint aches  Neuro/psych: denies neurologic deficits, seizure-like activity, dizziness, +rt hip weakness  Heme/lymph: denies new bruises/bleeds, lymphadenopathy    T(C): 36.7 (06-25-19 @ 01:41), Max: 37.1 (06-25-19 @ 00:29)  HR: 74 (06-25-19 @ 01:41) (71 - 79)  BP: 142/48 (06-25-19 @ 01:41) (140/46 - 157/71)  RR: 18 (06-25-19 @ 01:41) (18 - 18)  SpO2: 97% (06-25-19 @ 01:41) (95% - 100%)  Wt(kg): --Vital Signs Last 24 Hrs  T(C): 36.7 (25 Jun 2019 01:41), Max: 37.1 (25 Jun 2019 00:29)  T(F): 98.1 (25 Jun 2019 01:41), Max: 98.8 (25 Jun 2019 00:29)  HR: 74 (25 Jun 2019 01:41) (71 - 79)  BP: 142/48 (25 Jun 2019 01:41) (140/46 - 157/71)  BP(mean): 80 (24 Jun 2019 21:10) (80 - 80)  RR: 18 (25 Jun 2019 01:41) (18 - 18)  SpO2: 97% (25 Jun 2019 01:41) (95% - 100%)    PHYSICAL EXAM:  GENERAL: elderly female pt, cachectic NAD  HEAD:  Atraumatic, Normocephalic, ecchymosis healing 3-5cm on forehead  EYES: EOMI, PERRLA, conjunctiva and sclera clear  ENMT: No tonsillar erythema, exudates, or enlargement; Moist mucous membranes, poor dentition, No lesions  NECK: Supple, No JVD, Normal thyroid  NERVOUS SYSTEM:  Alert & Oriented X3, Good concentration; Motor Strength 5/5 B/L upper extremities, LE 3/5 strength B/L  CHEST/LUNG: reduced lung sounds B/L  HEART: Regular rate and rhythm; No murmurs, rubs, or gallops  ABDOMEN: Soft, Nontender, Nondistended; Bowel sounds present  EXTREMITIES:  2+ Peripheral Pulses, No clubbing, cyanosis, 1+pitting edema to knees B/L  LYMPH: No lymphadenopathy noted  SKIN: No rashes or lesions      LABS:                        9.6    9.6   )-----------( 281      ( 24 Jun 2019 19:13 )             30.3     06-24    141  |  107  |  30<H>  ----------------------------<  159<H>  3.9   |  21<L>  |  0.82    Ca    9.1      24 Jun 2019 19:13    TPro  5.8<L>  /  Alb  3.5  /  TBili  0.2  /  DBili  x   /  AST  15  /  ALT  14  /  AlkPhos  81  06-24    PT/INR - ( 24 Jun 2019 19:13 )   PT: 10.9 sec;   INR: 0.95 ratio         PTT - ( 24 Jun 2019 19:13 )  PTT:24.8 sec    CAPILLARY BLOOD GLUCOSE    EKG 6/24/19: RBBB, LVH 72,     RADIOLOGY & ADDITIONAL TESTS:  6/24/19 CT pelvis:    INTERPRETATION:  Acute comminuted intertrochanteric right femur fracture   with impaction and a mild apex anterior angulation. Also noted is mild   anterolisthesis of L4 on L5 and L5 on S1. Officialreport to follow.    6/24 CTH and cervical spine:    HEAD CT:   No acute intracranial hemorrhage, brain edema, or masseffect. No   displaced calvarial fracture.      CERVICAL SPINE CT:   No evidence for acute fracture or traumatic malalignment. Degenerative   changes. MELLO DOMINGO  89y  Female    Patient is a 89y old  Female w/PMHX CAD s/p CABGx3 >20 years ago, CHF, DM (A1C 4.9% in 5/2019 not on insulin), mild intermittent asthma, who presents with a chief complaint of rt hip pain after mechanical fall. Medicine consulted for preoperative medical evaluation for ORIF.    The pt has no known adverse reaction to anesthesia. She is allergic to aspirin, unclear effect. She lives at home, and is fully functional with ADLs; needs some assistance with using telephone, preparing meals and managing medications. Patient needs full assistance with shopping, housekeeping, doing laundry, using transportation and managing finances. She can complete 2-4 mets daily depending on how she feels; sometimes feels SOB and has had LE swelling in the past few weeks. Ambulates with walker.    PMHX/PSHX:  CAD, s/p CABG  CHF  DM  asthma  HTN  inguinal hernia repair  appendectomy  loop recorder insertion    FHX:  TB: father  malignancy: mother    SHX:  denies hx smoking, ETOH, recreational drug use    Allergies:  aspirin    REVIEW OF SYSTEMS:  Constitutional: denies fevers, chills, sweats  HEENT: denies changes in vision, rhinorrhea, tussis  CV: denies palpitations, CP  Pulm: +occasional SOB  GI: denies abdominal pain, N/V, diarrhea/constipation, hematochezia/melena  : denies dysuria/hematuria  Skin: denies new lesions, +B/L LE swelling  Back/MSK: denies new muscle/joint aches  Neuro/psych: denies neurologic deficits, seizure-like activity, dizziness, +rt hip weakness  Heme/lymph: denies new bruises/bleeds, lymphadenopathy    T(C): 36.7 (06-25-19 @ 01:41), Max: 37.1 (06-25-19 @ 00:29)  HR: 74 (06-25-19 @ 01:41) (71 - 79)  BP: 142/48 (06-25-19 @ 01:41) (140/46 - 157/71)  RR: 18 (06-25-19 @ 01:41) (18 - 18)  SpO2: 97% (06-25-19 @ 01:41) (95% - 100%)  Wt(kg): --Vital Signs Last 24 Hrs  T(C): 36.7 (25 Jun 2019 01:41), Max: 37.1 (25 Jun 2019 00:29)  T(F): 98.1 (25 Jun 2019 01:41), Max: 98.8 (25 Jun 2019 00:29)  HR: 74 (25 Jun 2019 01:41) (71 - 79)  BP: 142/48 (25 Jun 2019 01:41) (140/46 - 157/71)  BP(mean): 80 (24 Jun 2019 21:10) (80 - 80)  RR: 18 (25 Jun 2019 01:41) (18 - 18)  SpO2: 97% (25 Jun 2019 01:41) (95% - 100%)    PHYSICAL EXAM:  GENERAL: elderly female pt, cachectic NAD  HEAD:  Atraumatic, Normocephalic, ecchymosis healing 3-5cm on forehead  EYES: EOMI, PERRLA, conjunctiva and sclera clear  ENMT: No tonsillar erythema, exudates, or enlargement; Moist mucous membranes, poor dentition, No lesions  NECK: Supple, No JVD, Normal thyroid  NERVOUS SYSTEM:  Alert & Oriented X3, Good concentration; Motor Strength 5/5 B/L upper extremities, LE 3/5 strength B/L  CHEST/LUNG: reduced lung sounds B/L  HEART: Regular rate and rhythm; No murmurs, rubs, or gallops  ABDOMEN: Soft, Nontender, Nondistended; Bowel sounds present  EXTREMITIES:  2+ Peripheral Pulses, No clubbing, cyanosis, 1+pitting edema to knees B/L  LYMPH: No lymphadenopathy noted  SKIN: No rashes or lesions      LABS:                        9.6    9.6   )-----------( 281      ( 24 Jun 2019 19:13 )             30.3     06-24    141  |  107  |  30<H>  ----------------------------<  159<H>  3.9   |  21<L>  |  0.82    Ca    9.1      24 Jun 2019 19:13    TPro  5.8<L>  /  Alb  3.5  /  TBili  0.2  /  DBili  x   /  AST  15  /  ALT  14  /  AlkPhos  81  06-24    PT/INR - ( 24 Jun 2019 19:13 )   PT: 10.9 sec;   INR: 0.95 ratio         PTT - ( 24 Jun 2019 19:13 )  PTT:24.8 sec    CAPILLARY BLOOD GLUCOSE    EKG 6/24/19: RBBB, LVH 72,     RADIOLOGY & ADDITIONAL TESTS:  6/24/19 CT pelvis:    INTERPRETATION:  Acute comminuted intertrochanteric right femur fracture   with impaction and a mild apex anterior angulation. Also noted is mild   anterolisthesis of L4 on L5 and L5 on S1. Officialreport to follow.    6/24 CTH and cervical spine:    HEAD CT:   No acute intracranial hemorrhage, brain edema, or masseffect. No   displaced calvarial fracture.    CERVICAL SPINE CT:   No evidence for acute fracture or traumatic malalignment. Degenerative   changes.    6/24/19 CXR:  INTERPRETATION:  no emergent findings MELLO DOMINGO  89y  Female  PMD: previously Dr. Lit Clayton, now Dr. Lou    Patient is a 89y old  Female w/PMHX CAD s/p CABGx3 >20 years ago, CHF, DM (A1C 4.9% in 5/2019 not on insulin), HTN, mild intermittent asthma, who presents with a chief complaint of rt hip pain after mechanical fall. Medicine consulted for preoperative medical evaluation for ORIF.    The pt has no known adverse reaction to anesthesia. She is allergic to aspirin, unclear effect. She lives at home, and is fully functional with ADLs; needs some assistance with using telephone, preparing meals and managing medications. Patient needs full assistance with shopping, housekeeping, doing laundry, using transportation and managing finances. She can complete 2-4 mets daily depending on how she feels; sometimes feels SOB and has had LE swelling in the past few weeks. Ambulates with walker.    PMHX/PSHX:  CAD, s/p CABG  CHF  DM  asthma  HTN  inguinal hernia repair  appendectomy  loop recorder insertion    FHX:  TB: father  malignancy: mother    SHX:  denies hx smoking, ETOH, recreational drug use    Allergies:  aspirin    REVIEW OF SYSTEMS:  Constitutional: denies fevers, chills, sweats  HEENT: denies changes in vision, rhinorrhea, tussis  CV: denies palpitations, CP  Pulm: +occasional SOB  GI: denies abdominal pain, N/V, diarrhea/constipation, hematochezia/melena  : denies dysuria/hematuria  Skin: denies new lesions, +B/L LE swelling  Back/MSK: denies new muscle/joint aches  Neuro/psych: denies neurologic deficits, seizure-like activity, dizziness, +rt hip weakness  Heme/lymph: denies new bruises/bleeds, lymphadenopathy    T(C): 36.7 (06-25-19 @ 01:41), Max: 37.1 (06-25-19 @ 00:29)  HR: 74 (06-25-19 @ 01:41) (71 - 79)  BP: 142/48 (06-25-19 @ 01:41) (140/46 - 157/71)  RR: 18 (06-25-19 @ 01:41) (18 - 18)  SpO2: 97% (06-25-19 @ 01:41) (95% - 100%)  Wt(kg): --Vital Signs Last 24 Hrs  T(C): 36.7 (25 Jun 2019 01:41), Max: 37.1 (25 Jun 2019 00:29)  T(F): 98.1 (25 Jun 2019 01:41), Max: 98.8 (25 Jun 2019 00:29)  HR: 74 (25 Jun 2019 01:41) (71 - 79)  BP: 142/48 (25 Jun 2019 01:41) (140/46 - 157/71)  BP(mean): 80 (24 Jun 2019 21:10) (80 - 80)  RR: 18 (25 Jun 2019 01:41) (18 - 18)  SpO2: 97% (25 Jun 2019 01:41) (95% - 100%)    PHYSICAL EXAM:  GENERAL: elderly female pt, cachectic NAD  HEAD:  Atraumatic, Normocephalic, ecchymosis healing 3-5cm on forehead  EYES: EOMI, PERRLA, conjunctiva and sclera clear  ENMT: No tonsillar erythema, exudates, or enlargement; Moist mucous membranes, poor dentition, No lesions  NECK: Supple, No JVD, Normal thyroid  NERVOUS SYSTEM:  Alert & Oriented X3, Good concentration; Motor Strength 5/5 B/L upper extremities, LE 3/5 strength B/L  CHEST/LUNG: reduced lung sounds B/L  HEART: Regular rate and rhythm; No murmurs, rubs, or gallops  ABDOMEN: Soft, Nontender, Nondistended; Bowel sounds present  EXTREMITIES:  2+ Peripheral Pulses, No clubbing, cyanosis, 1+pitting edema to knees B/L  LYMPH: No lymphadenopathy noted  SKIN: No rashes or lesions      LABS:                        9.6    9.6   )-----------( 281      ( 24 Jun 2019 19:13 )             30.3     06-24    141  |  107  |  30<H>  ----------------------------<  159<H>  3.9   |  21<L>  |  0.82    Ca    9.1      24 Jun 2019 19:13    TPro  5.8<L>  /  Alb  3.5  /  TBili  0.2  /  DBili  x   /  AST  15  /  ALT  14  /  AlkPhos  81  06-24    PT/INR - ( 24 Jun 2019 19:13 )   PT: 10.9 sec;   INR: 0.95 ratio         PTT - ( 24 Jun 2019 19:13 )  PTT:24.8 sec    CAPILLARY BLOOD GLUCOSE    EKG 6/24/19: RBBB, LVH 72,     RADIOLOGY & ADDITIONAL TESTS:  6/24/19 CT pelvis:    INTERPRETATION:  Acute comminuted intertrochanteric right femur fracture   with impaction and a mild apex anterior angulation. Also noted is mild   anterolisthesis of L4 on L5 and L5 on S1. Officialreport to follow.    6/24 CTH and cervical spine:    HEAD CT:   No acute intracranial hemorrhage, brain edema, or masseffect. No   displaced calvarial fracture.    CERVICAL SPINE CT:   No evidence for acute fracture or traumatic malalignment. Degenerative   changes.    6/24/19 CXR:  INTERPRETATION:  no emergent findings

## 2019-06-25 NOTE — CONSULT NOTE ADULT - SUBJECTIVE AND OBJECTIVE BOX
MRN-[id number]    CHIEF COMPLAINT:  Patient is a 89y old  Female who presents with a chief complaint of Mechanical fall (25 Jun 2019 02:19)      HISTORY OF PRESENT ILLNESS:  MELLO DOMINGO is a 89y Female patient with past medical history of *** presenting with ***.     Allergies    No Known Allergies    Intolerances    	    PAST MEDICAL & SURGICAL HISTORY:      FAMILY HISTORY:      SOCIAL HISTORY:    [ ] Non-smoker  [ ] Smoker  [ ] Alcohol    REVIEW OF SYSTEMS:  CONSTITUTIONAL: No fever, weight loss, or fatigue  EYES: No eye pain, visual disturbances, or discharge  ENMT:  No difficulty hearing, tinnitus, vertigo; No sinus or throat pain  NECK: No pain or stiffness  RESPIRATORY: No cough, wheezing, chills or hemoptysis; No Shortness of Breath  CARDIOVASCULAR: No chest pain, palpitations, passing out, dizziness, or leg swelling  GASTROINTESTINAL: No abdominal or epigastric pain. No nausea, vomiting, or hematemesis; No diarrhea or constipation. No melena or hematochezia.  GENITOURINARY: No dysuria, frequency, hematuria, or incontinence  NEUROLOGICAL: No headaches, memory loss, loss of strength, numbness, or tremors  SKIN: No itching, burning, rashes, or lesions   LYMPH Nodes: No enlarged glands  ENDOCRINE: No heat or cold intolerance; No hair loss  MUSCULOSKELETAL: No joint pain or swelling; No muscle, back, or extremity pain  PSYCHIATRIC: No depression, anxiety, mood swings, or difficulty sleeping  HEME/LYMPH: No easy bruising, or bleeding gums  ALLERY AND IMMUNOLOGIC: No hives or eczema	    [ ] All others negative	  [ ] Unable to obtain    I&O's Summary    24 Jun 2019 07:01  -  25 Jun 2019 06:45  --------------------------------------------------------  IN: 500 mL / OUT: 0 mL / NET: 500 mL        PHYSICAL EXAM:  Vital Signs Last 24 Hrs  T(C): 36.6 (25 Jun 2019 06:22), Max: 37.1 (25 Jun 2019 00:29)  T(F): 97.8 (25 Jun 2019 06:22), Max: 98.8 (25 Jun 2019 00:29)  HR: 73 (25 Jun 2019 06:22) (71 - 79)  BP: 150/56 (25 Jun 2019 06:22) (140/46 - 157/71)  BP(mean): 80 (24 Jun 2019 21:10) (80 - 80)  RR: 18 (25 Jun 2019 06:22) (18 - 18)  SpO2: 95% (25 Jun 2019 06:22) (95% - 100%)  Appearance: Normal	  HEENT:   Normal oral mucosa, PERRL, EOMI	  Lymphatic: No lymphadenopathy  Cardiovascular: Normal S1 S2, No JVD, No murmurs, No edema  Respiratory: Lungs clear to auscultation	  Psychiatry: A & O x 3, Mood & affect appropriate  Gastrointestinal:  Soft, Non-tender, + BS	  Skin: No rashes, No ecchymoses, No cyanosis	  Neurologic: Non-focal  Extremities: Normal range of motion, No clubbing, cyanosis or edema  Vascular: Peripheral pulses palpable 2+ bilaterally    MEDICATIONS:  MEDICATIONS  (STANDING):  acetaminophen   Tablet .. 975 milliGRAM(s) Oral every 8 hours  atorvastatin 40 milliGRAM(s) Oral at bedtime  chlorhexidine 2% Cloths 1 Application(s) Topical daily  dextrose 5% + sodium chloride 0.45% with potassium chloride 20 mEq/L 1000 milliLiter(s) (50 mL/Hr) IV Continuous <Continuous>  docusate sodium 100 milliGRAM(s) Oral three times a day  furosemide    Tablet 20 milliGRAM(s) Oral two times a day  senna 2 Tablet(s) Oral at bedtime  verapamil  milliGRAM(s) Oral daily    MEDICATIONS  (PRN):  ALBUTerol    0.083% 2.5 milliGRAM(s) Nebulizer every 6 hours PRN Shortness of Breath and/or Wheezing  magnesium hydroxide Suspension 30 milliLiter(s) Oral daily PRN Constipation  oxyCODONE    IR 2.5 milliGRAM(s) Oral every 4 hours PRN Mild and Moderate  oxyCODONE    IR 5 milliGRAM(s) Oral every 4 hours PRN Severe Pain (7 - 10)      LABS:	 	  CBC Full  -  ( 25 Jun 2019 04:33 )  WBC Count : 7.3 K/uL  Hemoglobin : 8.7 g/dL  Hematocrit : 27.7 %  Platelet Count - Automated : 237 K/uL  Mean Cell Volume : 89.9 fl  Mean Cell Hemoglobin : 28.2 pg  Mean Cell Hemoglobin Concentration : 31.4 gm/dL  Auto Neutrophil # : x  Auto Lymphocyte # : x  Auto Monocyte # : x  Auto Eosinophil # : x  Auto Basophil # : x  Auto Neutrophil % : x  Auto Lymphocyte % : x  Auto Monocyte % : x  Auto Eosinophil % : x  Auto Basophil % : x    06-25    139  |  107  |  22  ----------------------------<  161<H>  3.9   |  22  |  0.72  06-24    141  |  107  |  30<H>  ----------------------------<  159<H>  3.9   |  21<L>  |  0.82    Ca    8.4      25 Jun 2019 04:33  Ca    9.1      24 Jun 2019 19:13    TPro  5.8<L>  /  Alb  3.5  /  TBili  0.2  /  DBili  x   /  AST  15  /  ALT  14  /  AlkPhos  81  06-24    PT/INR - ( 25 Jun 2019 04:33 )   PT: 11.3 sec;   INR: 0.98 ratio         PTT - ( 25 Jun 2019 04:33 )  PTT:25.7 sec        proBNP:   Lipid Profile:   HgA1c:   TSH:     TELEMETRY: 	    ECG:  	  RADIOLOGY:  OTHER: 	    CARDIAC TESTING/STUDIES:    [ ] Echocardiogram:  [ ]  Catheterization:  [ ] Stress Test:  	  	  ASSESSMENT/PLAN: 	      Marcie Jack MD, MPH, JAMI  Cardiovascular Specialist Attending  [facility]  C: [phone]  E: [email or web address]  (Cardiology Nocturnist cell number available 7 pm - 7 am every night; available daytime week days for follow-up only; daytime weekends covered by general cardiology consult service) MRN-[id number]    CHIEF COMPLAINT:  Mechanical fall    HISTORY OF PRESENT ILLNESS:  MELLO DOMINGO is a 89y Female patient with past medical history of CAD s/p CABGx3 >20 years ago, CHF, HTN, mild intermittent asthma, presenting with hip fracture s/p mechanical fall with planned ORIF. History of chronic lower extremity edema and mild shortness of breath with exertion. BNP ~600 which is age appropriate. Ambulates with walker. Cardiology consulted for pre=operative optimization.     Allergies  No Known Allergies    PAST MEDICAL & SURGICAL HISTORY:  CAD s/p CABG  HTN    FAMILY HISTORY:  Non-contributory    SOCIAL HISTORY:    Non-smoker    REVIEW OF SYSTEMS:  CONSTITUTIONAL: No fever, weight loss, Positive fatigue  EYES: No eye pain, visual disturbances  ENMT:  No difficulty hearing, tinnitus  NECK: No pain or stiffness  RESPIRATORY: No cough, wheezing, chills or hemoptysis; Positiev intermittent VUONG.   CARDIOVASCULAR: No chest pain, palpitations, passing out, dizziness. Chronic leg swelling.   GASTROINTESTINAL: No abdominal or epigastric pain. No nausea, vomiting  NEUROLOGICAL: No headaches, Positive lower extremity weakness.   SKIN: No itching, burning, rashes. Positive ecchymosis.    LYMPH Nodes: No enlarged glands  MUSCULOSKELETAL: No joint pain or swelling; Positive right hip pain and weakness.   PSYCHIATRIC: No depression, anxiety  HEME/LYMPH: No easy bruising,    I&O's Summary    2019 07:01  -  2019 06:45  --------------------------------------------------------  IN: 500 mL / OUT: 0 mL / NET: 500 mL        PHYSICAL EXAM:  Vital Signs Last 24 Hrs  T(C): 36.6 (2019 06:22), Max: 37.1 (2019 00:29)  T(F): 97.8 (2019 06:22), Max: 98.8 (2019 00:29)  HR: 73 (2019 06:22) (71 - 79)  BP: 150/56 (2019 06:22) (140/46 - 157/71)  BP(mean): 80 (2019 21:10) (80 - 80)  RR: 18 (2019 06:22) (18 - 18)  SpO2: 95% (2019 06:22) (95% - 100%)    Appearance: Normal	  HEENT:   Normal oral mucosa	  Lymphatic: No lymphadenopathy  Cardiovascular: Normal S1 S2, No JVD, No murmurs, Positive bilateral pitting edema chronic.  Respiratory: Lungs clear to auscultation	  Psychiatry: A & O x 3  Gastrointestinal:  Soft, Non-tender  Skin: No rashes, No ecchymoses, No cyanosis	  Neurologic: Non-focal  Extremities: No clubbing, cyanosis. Limited RL extremity weakness.   Vascular: Peripheral pulses palpable 2+ bilaterally    MEDICATIONS:  MEDICATIONS  (STANDING):  acetaminophen   Tablet .. 975 milliGRAM(s) Oral every 8 hours  atorvastatin 40 milliGRAM(s) Oral at bedtime  chlorhexidine 2% Cloths 1 Application(s) Topical daily  dextrose 5% + sodium chloride 0.45% with potassium chloride 20 mEq/L 1000 milliLiter(s) (50 mL/Hr) IV Continuous <Continuous>  docusate sodium 100 milliGRAM(s) Oral three times a day  furosemide    Tablet 20 milliGRAM(s) Oral two times a day  senna 2 Tablet(s) Oral at bedtime  verapamil  milliGRAM(s) Oral daily    MEDICATIONS  (PRN):  ALBUTerol    0.083% 2.5 milliGRAM(s) Nebulizer every 6 hours PRN Shortness of Breath and/or Wheezing  magnesium hydroxide Suspension 30 milliLiter(s) Oral daily PRN Constipation  oxyCODONE    IR 2.5 milliGRAM(s) Oral every 4 hours PRN Mild and Moderate  oxyCODONE    IR 5 milliGRAM(s) Oral every 4 hours PRN Severe Pain (7 - 10)    LABS:	 	  CBC Full  -  ( 2019 04:33 )  WBC Count : 7.3 K/uL  Hemoglobin : 8.7 g/dL  Hematocrit : 27.7 %  Platelet Count - Automated : 237 K/uL  Mean Cell Volume : 89.9 fl  Mean Cell Hemoglobin : 28.2 pg  Mean Cell Hemoglobin Concentration : 31.4 gm/dL  Auto Neutrophil # : x  Auto Lymphocyte # : x  Auto Monocyte # : x  Auto Eosinophil # : x  Auto Basophil # : x  Auto Neutrophil % : x  Auto Lymphocyte % : x  Auto Monocyte % : x  Auto Eosinophil % : x  Auto Basophil % : x    06-    139  |  107  |  22  ----------------------------<  161<H>  3.9   |  22  |  0.72      141  |  107  |  30<H>  ----------------------------<  159<H>  3.9   |  21<L>  |  0.82    Ca    8.4      2019 04:33  Ca    9.1      2019 19:13    TPro  5.8<L>  /  Alb  3.5  /  TBili  0.2  /  DBili  x   /  AST  15  /  ALT  14  /  AlkPhos  81  -    PT/INR - ( 2019 04:33 )   PT: 11.3 sec;   INR: 0.98 ratio       PTT - ( 2019 04:33 )  PTT:25.7 sec    proBNP: ~600  HgA1c: 4.9%    TELEMETRY: 2019  NSR     EC2019  NSR, RBBB, LAFB, LVH with strain pattern.     CARDIAC TESTING/STUDIES:    N/A  	  ASSESSMENT/PLAN: 	  89y Female patient with past medical history of CAD s/p CABGx3 >20 years ago, CHF, HTN, mild intermittent asthma, presenting with hip fracture s/p mechanical fall with planned ORIF.     1) Pre-operative Optimization   RCRI 2: 10% 30 day risk of death, MI, or cardiac arrest.   METS <4  Intermediate risk surgery   No absolute contraindications including active chest pain, decompensated heart failure, or life threatening arrythmia.     ·	Proceed with planned orthopedic surgery as the benefits outweigh the risks.   ·	ECHO prior to discharge is optimal to assess degree of LVH, type of heart failure (suspect diastolic) volume status, and valve pathology.     2) CAD   s/p CABG   Asymptomatic   Aspirin allergy  ECG bifascicular block.     ·	Continue risk factor modification/control with good BP management.   ·	At a later time, consider use of P2Y12 inhibitor in the setting of aspirin allergy for secondary cardiovascular prevention but weight pros/cons to bleeding and fall risk.     3) CHF  Diastolic versus systolic.   BNP ~600, age appropriate     ·	Judicious use of fluids but minimal hydration while NPO if surgery planned at the end of the day.     4) HTN   Well controlled.     ·	Continue medical management with verapamil 120 g daily.     Marcie Jack MD, MPH, JAMI  Cardiovascular Specialist Attending  [facility]  C: [phone]  E: [email or web address]  (Cardiology Nocturnist cell number available 7 pm - 7 am every night; available daytime week days for follow-up only; daytime weekends covered by general cardiology consult service) MRN-[id number]    CHIEF COMPLAINT:  Mechanical fall    HISTORY OF PRESENT ILLNESS:  MELLO DOMINGO is a 89y Female patient with past medical history of CAD s/p CABGx3 >20 years ago not on aspirin due to GIB issues (not allergy), CHF, HTN, mild intermittent asthma, presenting with hip fracture s/p mechanical fall with planned ORIF. History of chronic lower extremity edema and mild shortness of breath with exertion relieved with inhaler. BNP ~600 which is age appropriate. Ambulates with walker. Cardiology consulted for pre-operative optimization.     Allergies  No Known Allergies    PAST MEDICAL & SURGICAL HISTORY:  CAD s/p CABG  HTN    FAMILY HISTORY:  Non-contributory    SOCIAL HISTORY:    Non-smoker    REVIEW OF SYSTEMS:  CONSTITUTIONAL: No fever, weight loss, Positive fatigue  EYES: No eye pain, visual disturbances  ENMT:  No tinnitus. Positive difficulty hearing.   NECK: No pain or stiffness  RESPIRATORY: No cough, wheezing, chills or hemoptysis; Positive intermittent VUONG.   CARDIOVASCULAR: No chest pain, palpitations, passing out, dizziness. Chronic leg swelling.   GASTROINTESTINAL: No abdominal or epigastric pain. No nausea, vomiting  NEUROLOGICAL: No headaches, Positive lower extremity weakness.   SKIN: No itching, burning, rashes. Positive ecchymosis.    LYMPH Nodes: No enlarged glands  MUSCULOSKELETAL: No joint pain or swelling; Positive right hip pain and weakness.   PSYCHIATRIC: No depression, anxiety  HEME/LYMPH: No easy bruising,    I&O's Summary    2019 07:01  -  2019 06:45  --------------------------------------------------------  IN: 500 mL / OUT: 0 mL / NET: 500 mL    PHYSICAL EXAM:  Vital Signs Last 24 Hrs  T(C): 36.6 (2019 06:22), Max: 37.1 (2019 00:29)  T(F): 97.8 (2019 06:22), Max: 98.8 (2019 00:29)  HR: 73 (2019 06:22) (71 - 79)  BP: 150/56 (2019 06:22) (140/46 - 157/71)  BP(mean): 80 (2019 21:10) (80 - 80)  RR: 18 (2019 06:22) (18 - 18)  SpO2: 95% (2019 06:22) (95% - 100%)    Appearance: Normal	  HEENT:   Normal oral mucosa	  Lymphatic: No lymphadenopathy  Cardiovascular: Normal S1 S2, Positive bilateral pitting edema chronic.  Respiratory: Lungs clear to auscultation	  Psychiatry: A & O x 3  Gastrointestinal:  Soft, Non-tender  Skin: No rashes, No ecchymoses, No cyanosis	  Neurologic: Non-focal  Extremities: No clubbing, cyanosis. Limited RL extremity weakness.   Vascular: Peripheral pulses palpable 2+ bilaterally    MEDICATIONS:  MEDICATIONS  (STANDING):  acetaminophen   Tablet .. 975 milliGRAM(s) Oral every 8 hours  atorvastatin 40 milliGRAM(s) Oral at bedtime  chlorhexidine 2% Cloths 1 Application(s) Topical daily  dextrose 5% + sodium chloride 0.45% with potassium chloride 20 mEq/L 1000 milliLiter(s) (50 mL/Hr) IV Continuous <Continuous>  docusate sodium 100 milliGRAM(s) Oral three times a day  furosemide    Tablet 20 milliGRAM(s) Oral two times a day  senna 2 Tablet(s) Oral at bedtime  verapamil  milliGRAM(s) Oral daily    MEDICATIONS  (PRN):  ALBUTerol    0.083% 2.5 milliGRAM(s) Nebulizer every 6 hours PRN Shortness of Breath and/or Wheezing  magnesium hydroxide Suspension 30 milliLiter(s) Oral daily PRN Constipation  oxyCODONE    IR 2.5 milliGRAM(s) Oral every 4 hours PRN Mild and Moderate  oxyCODONE    IR 5 milliGRAM(s) Oral every 4 hours PRN Severe Pain (7 - 10)    LABS:	 	  CBC Full  -  ( 2019 04:33 )  WBC Count : 7.3 K/uL  Hemoglobin : 8.7 g/dL  Hematocrit : 27.7 %  Platelet Count - Automated : 237 K/uL  Mean Cell Volume : 89.9 fl  Mean Cell Hemoglobin : 28.2 pg  Mean Cell Hemoglobin Concentration : 31.4 gm/dL        139  |  107  |  22  ----------------------------<  161<H>  3.9   |  22  |  0.72      141  |  107  |  30<H>  ----------------------------<  159<H>  3.9   |  21<L>  |  0.82    Ca    8.4      2019 04:33  Ca    9.1      2019 19:13    TPro  5.8<L>  /  Alb  3.5  /  TBili  0.2  /  DBili  x   /  AST  15  /  ALT  14  /  AlkPhos  81  06-24    PT/INR - ( 2019 04:33 )   PT: 11.3 sec;   INR: 0.98 ratio       PTT - ( 2019 04:33 )  PTT:25.7 sec    proBNP: ~600  HgA1c: 4.9%    TELEMETRY: 2019  NSR     EC2019  NSR, RBBB, LAFB, LVH with strain pattern.     CARDIAC TESTING/STUDIES:    N/A  	  ASSESSMENT/PLAN: 	  89y Female patient with past medical history of CAD s/p CABGx3 >20 years ago, CHF, HTN, mild intermittent asthma, presenting with hip fracture s/p mechanical fall with planned ORIF.     1) Pre-operative Optimization   RCRI 2: 10% 30 day risk of death, MI, or cardiac arrest.   METS <4  Intermediate risk surgery   No absolute contraindications including active chest pain, decompensated heart failure, or life threatening arrythmia.     ·	Proceed with planned orthopedic surgery as the benefits outweigh the risks. ECHO not necessary prior to surgery. No active cardiac issues.   ·	ECHO prior to discharge is optimal to assess degree of LVH, type of heart failure (suspect diastolic) volume status, and valve pathology.     2) CAD   s/p CABG   Asymptomatic   Aspirin has caused GIB issues in miya past and they decided not to be on it.   ECG bifascicular block.     ·	Continue risk factor modification/control with good BP management.   ·	Continue aeurobic activity as toelrated for her age group and heart healthy eating.     3) CHF  Diastolic versus systolic.   BNP ~600, age appropriate     ·	Judicious use of fluids but minimal hydration while NPO if surgery planned at the end of the day. Discontinue fluids when she is eating and drinking.    4) HTN   Well controlled.     ·	Continue medical management with verapamil 120 g daily.     Marcie Jack MD, MPH, JAMI  Cardiovascular Specialist Attending  [facility]  C: [phone]  E: [email or web address]  (Cardiology Nocturnist cell number available 7 pm - 7 am every night; available daytime week days for follow-up only; daytime weekends covered by general cardiology consult service)

## 2019-06-25 NOTE — CONSULT NOTE ADULT - ASSESSMENT
89 yo female pt w/PMHX CAD s/p CABG, CHF who presents with a chief complaint of rt hip pain after mechanical fall; medicine consulted for preoperative medical evaluation. 89 yo female pt w/PMHX CAD s/p CABG, CHF who presents with a chief complaint of rt hip pain after mechanical fall; medicine consulted for preoperative medical evaluation for ORIF for rt intertrochanteric femur fx. 89y old  female pt w/PMHX CAD s/p CABGx3 >20 years ago, CHF, DM (A1C 4.9% in 5/2019 not on insulin), HTN, mild intermittent asthma, who presents with a chief complaint of rt hip pain after mechanical fall. Medicine consulted for preoperative medical evaluation for ORIF.

## 2019-06-26 ENCOUNTER — TRANSCRIPTION ENCOUNTER (OUTPATIENT)
Age: 84
End: 2019-06-26

## 2019-06-26 LAB
ANION GAP SERPL CALC-SCNC: 16 MMOL/L — SIGNIFICANT CHANGE UP (ref 5–17)
BUN SERPL-MCNC: 14 MG/DL — SIGNIFICANT CHANGE UP (ref 7–23)
CALCIUM SERPL-MCNC: 8.2 MG/DL — LOW (ref 8.4–10.5)
CHLORIDE SERPL-SCNC: 102 MMOL/L — SIGNIFICANT CHANGE UP (ref 96–108)
CO2 SERPL-SCNC: 21 MMOL/L — LOW (ref 22–31)
CREAT SERPL-MCNC: 0.67 MG/DL — SIGNIFICANT CHANGE UP (ref 0.5–1.3)
GLUCOSE SERPL-MCNC: 179 MG/DL — HIGH (ref 70–99)
HCT VFR BLD CALC: 27.8 % — LOW (ref 34.5–45)
HGB BLD-MCNC: 9 G/DL — LOW (ref 11.5–15.5)
MCHC RBC-ENTMCNC: 29.4 PG — SIGNIFICANT CHANGE UP (ref 27–34)
MCHC RBC-ENTMCNC: 32.5 GM/DL — SIGNIFICANT CHANGE UP (ref 32–36)
MCV RBC AUTO: 90.5 FL — SIGNIFICANT CHANGE UP (ref 80–100)
PLATELET # BLD AUTO: 240 K/UL — SIGNIFICANT CHANGE UP (ref 150–400)
POTASSIUM SERPL-MCNC: 3.6 MMOL/L — SIGNIFICANT CHANGE UP (ref 3.5–5.3)
POTASSIUM SERPL-SCNC: 3.6 MMOL/L — SIGNIFICANT CHANGE UP (ref 3.5–5.3)
RBC # BLD: 3.07 M/UL — LOW (ref 3.8–5.2)
RBC # FLD: 14.5 % — SIGNIFICANT CHANGE UP (ref 10.3–14.5)
SODIUM SERPL-SCNC: 139 MMOL/L — SIGNIFICANT CHANGE UP (ref 135–145)
WBC # BLD: 11.3 K/UL — HIGH (ref 3.8–10.5)
WBC # FLD AUTO: 11.3 K/UL — HIGH (ref 3.8–10.5)

## 2019-06-26 RX ORDER — HALOPERIDOL DECANOATE 100 MG/ML
5 INJECTION INTRAMUSCULAR ONCE
Refills: 0 | Status: COMPLETED | OUTPATIENT
Start: 2019-06-26 | End: 2019-06-26

## 2019-06-26 RX ORDER — ALBUTEROL 90 UG/1
2.5 AEROSOL, METERED ORAL EVERY 6 HOURS
Refills: 0 | Status: DISCONTINUED | OUTPATIENT
Start: 2019-06-26 | End: 2019-06-28

## 2019-06-26 RX ORDER — HALOPERIDOL DECANOATE 100 MG/ML
1 INJECTION INTRAMUSCULAR ONCE
Refills: 0 | Status: COMPLETED | OUTPATIENT
Start: 2019-06-26 | End: 2019-06-26

## 2019-06-26 RX ORDER — HYDRALAZINE HCL 50 MG
5 TABLET ORAL ONCE
Refills: 0 | Status: COMPLETED | OUTPATIENT
Start: 2019-06-26 | End: 2019-06-26

## 2019-06-26 RX ORDER — ACETAMINOPHEN 500 MG
1000 TABLET ORAL ONCE
Refills: 0 | Status: COMPLETED | OUTPATIENT
Start: 2019-06-26 | End: 2019-06-26

## 2019-06-26 RX ORDER — CEFAZOLIN SODIUM 1 G
2000 VIAL (EA) INJECTION EVERY 8 HOURS
Refills: 0 | Status: DISCONTINUED | OUTPATIENT
Start: 2019-06-26 | End: 2019-06-28

## 2019-06-26 RX ADMIN — SODIUM CHLORIDE 100 MILLILITER(S): 9 INJECTION INTRAMUSCULAR; INTRAVENOUS; SUBCUTANEOUS at 01:48

## 2019-06-26 RX ADMIN — Medication 100 MILLIGRAM(S): at 20:59

## 2019-06-26 RX ADMIN — ATORVASTATIN CALCIUM 40 MILLIGRAM(S): 80 TABLET, FILM COATED ORAL at 20:59

## 2019-06-26 RX ADMIN — Medication 20 MILLIGRAM(S): at 11:51

## 2019-06-26 RX ADMIN — Medication 20 MILLIGRAM(S): at 20:59

## 2019-06-26 RX ADMIN — HALOPERIDOL DECANOATE 1 MILLIGRAM(S): 100 INJECTION INTRAMUSCULAR at 09:26

## 2019-06-26 RX ADMIN — Medication 5 MILLIGRAM(S): at 03:00

## 2019-06-26 RX ADMIN — HALOPERIDOL DECANOATE 5 MILLIGRAM(S): 100 INJECTION INTRAMUSCULAR at 00:15

## 2019-06-26 RX ADMIN — HYDROMORPHONE HYDROCHLORIDE 0.25 MILLIGRAM(S): 2 INJECTION INTRAMUSCULAR; INTRAVENOUS; SUBCUTANEOUS at 01:45

## 2019-06-26 RX ADMIN — Medication 120 MILLIGRAM(S): at 11:51

## 2019-06-26 RX ADMIN — Medication 400 MILLIGRAM(S): at 06:07

## 2019-06-26 RX ADMIN — RIVAROXABAN 10 MILLIGRAM(S): KIT at 11:51

## 2019-06-26 RX ADMIN — Medication 100 MILLIGRAM(S): at 06:06

## 2019-06-26 RX ADMIN — Medication 100 MILLIGRAM(S): at 14:33

## 2019-06-26 RX ADMIN — SODIUM CHLORIDE 60 MILLILITER(S): 9 INJECTION INTRAMUSCULAR; INTRAVENOUS; SUBCUTANEOUS at 20:58

## 2019-06-26 RX ADMIN — HYDROMORPHONE HYDROCHLORIDE 0.25 MILLIGRAM(S): 2 INJECTION INTRAMUSCULAR; INTRAVENOUS; SUBCUTANEOUS at 01:30

## 2019-06-26 RX ADMIN — Medication 975 MILLIGRAM(S): at 20:58

## 2019-06-26 RX ADMIN — Medication 1000 MILLIGRAM(S): at 06:45

## 2019-06-26 NOTE — PHYSICAL THERAPY INITIAL EVALUATION ADULT - TRANSFER TRAINING, PT EVAL
Rubens Higgins is a 46year old female. Patient presents with: Other: needs exam for mobility. .... [facility] Band room 2      HPI:   Patient has been wheelchair dependent for 7 years. She also suffers from coronary artery disease with ischemic cardiomyopathy.   She i 8 (eight) hours as needed for Nausea. Disp:  Rfl:    RENVELA 800 MG Oral Tab Take 1,600 mg by mouth 3 (three) times daily. Disp:  Rfl:    BRILINTA 90 MG Oral Tab Take 90 mg by mouth 2 (two) times daily.    Disp:  Rfl:    aspirin 81 MG Oral Tab EC Take 81 chronically totally occluded popliteal artery as well as left SFA to the ostium, balloon angioplasty of the entire leg and deployment of ever flex stents in the proximal ostial SFA mid SFA distal SFA and in the popliteal June 2018.   Status post cutting bal developed, well nourished, wheelchair dependent on chronic oxygen  SKIN: no rashes,no suspicious lesions  HEENT: atraumatic, normocephalic, R TM dull with decreased mobility, L TM normal, Pharynx normal  NECK: supple, no cervical adenopathy  LUNGS: clear t 100 MG Oral Tab 20 tablet 0     Sig: Take 1 tablet (100 mg total) by mouth 2 (two) times daily.        Imaging & Consults:  None GOALS: Pt will perform transfers with RW & CG/Ke in 4wks

## 2019-06-26 NOTE — DISCHARGE NOTE PROVIDER - NSDCCPCAREPLAN_GEN_ALL_CORE_FT
PRINCIPAL DISCHARGE DIAGNOSIS  Diagnosis: Femur fracture  Assessment and Plan of Treatment: IM Nail DC Instructions:  1.	Resume previous diet, regular or diabetic as appropriate  2.	Weight Bearing as Tolerated with assistance and rolling walker  3.	Continue DVT/PE Prophylaxis. Xarelto. See Med Rec for Duration and dose.  4.	PT daily  5.	Follow up with Orthopedic Surgeon Dr. Smith in 14 Days. Call Office For Appointment.  6.	There are no staples or sutures to be removed.   7.	Ice the hip as much as possible  8.	Keep Aquacel bandage on Hip and change very 7days. Change only if wet or soiled using Tegaderm/Paper tape and dry gauze.

## 2019-06-26 NOTE — PHYSICAL THERAPY INITIAL EVALUATION ADULT - ADDITIONAL COMMENTS
Unable to obtain hx from patient due to cognitive status, as per case management notes, pt resides alone in a private home with about 3 steps to enter, one level within, ambulatory with RW.

## 2019-06-26 NOTE — PROGRESS NOTE ADULT - SUBJECTIVE AND OBJECTIVE BOX
Orthopedic Surgery Progress Note    S: Patient is now 4h s/p IMN for R IT Fx, recovering in PACU.  She was predominantly confused/agitated since surgery, has since calmed down somewhat.  Has not urinated, bladder scan revealed ~500cc, pt appearing uncomfortable, straight cath administered yielding 500cc urine.  SBP increased to 195, 5mg hydralazine administered with some improvement.     O:  Vital Signs Last 24 Hrs  T(C): 36.4 (26 Jun 2019 03:00), Max: 36.6 (25 Jun 2019 06:22)  T(F): 97.5 (26 Jun 2019 03:00), Max: 97.9 (25 Jun 2019 09:52)  HR: 87 (26 Jun 2019 04:00) (68 - 115)  BP: 173/72 (26 Jun 2019 04:00) (105/56 - 202/84)  BP(mean): 103 (26 Jun 2019 04:00) (91 - 123)  RR: 18 (26 Jun 2019 04:00) (16 - 20)  SpO2: 96% (26 Jun 2019 04:00) (88% - 100%)    Gen: Confused, appears restless but no agitation  RLE:  Dressing with mild SS spotting at superior incision  No hematoma appreciated  EHL/FHL/TA/GS intact  Patient noncompliant with sensory exam  WWP distally    Labs:                        9.0    11.3  )-----------( 240      ( 26 Jun 2019 01:21 )             27.8                         8.7    7.3   )-----------( 237      ( 25 Jun 2019 04:33 )             27.7       06-26    139  |  102  |  14  ----------------------------<  179<H>  3.6   |  21<L>  |  0.67        PT/INR - ( 25 Jun 2019 04:33 )   PT: 11.3 sec;   INR: 0.98 ratio         PTT - ( 25 Jun 2019 04:33 )  PTT:25.7 sec

## 2019-06-26 NOTE — PROGRESS NOTE ADULT - ASSESSMENT
A/P 89y year old female s/p Intramedullary nailing for IT fracture of right femur, confused and hypertensive in PACU, now somewhat improved.      Pain Control  Monitor BP  DVT PPX - Xarelto   PT/OOB  WBAT   Dispo Planning - pending PT mitchellal

## 2019-06-26 NOTE — DISCHARGE NOTE PROVIDER - CARE PROVIDER_API CALL
Azucena Smith (MD)  Orthopaedic Surgery  [address] [place] [address], [address]  [place] [address]  Phone: [phone]  Fax: [phone]  Follow Up Time:

## 2019-06-26 NOTE — PHYSICAL THERAPY INITIAL EVALUATION ADULT - TRANSFER SAFETY CONCERNS NOTED: BED/CHAIR, REHAB EVAL
decreased step length/decreased weight-shifting ability/decreased safety awareness/decreased sequencing ability

## 2019-06-26 NOTE — PHYSICAL THERAPY INITIAL EVALUATION ADULT - PERTINENT HX OF CURRENT PROBLEM, REHAB EVAL
Pt is 89F admitted 6/24/19 presents to [facility] ED s/p mech fall c/o severe R hip pain and inability to ambulate.  Patient denies headstrike or LOC. Localizes pain to R hip/femur.

## 2019-06-26 NOTE — DISCHARGE NOTE PROVIDER - HOSPITAL COURSE
Hospital Course:    Patient presented to [facility] ED after a fall, found to have an intertrochanteric hip fracture, and admitted to the Medical Service. Pt was  medically/Cardiac cleared prior to surgery. Prophylactic antibiotics were started before the procedure and continued for 24 hours. They were admitted after surgery to the orthopedic floor.  There were no complications during the hospital stay. All home medications were continued.        Routine consults were obtained from Physical Therapy for twice daily PT, and followed by Medicine for Co-management. Patient was placed on Xarelto for anticoagulation.  Pertinent home medications were continued.  Daily labs were followed.          On POD 0 there were no major issues. Pt received PT daily, and a new Aquacel dressing was applied prior to discharge. The pt will likely need DC to Rehab for ongoing PT once evaluated and ok per Medicine.   The orthopedic Attending is aware and agrees. See addendum to DC summary per medical team below for any additional info or if any changes. Hospital Course:    Patient presented to [facility] ED after a fall, found to have an intertrochanteric hip fracture, and admitted to the Medical Service. Pt was  medically/Cardiac cleared prior to surgery. Prophylactic antibiotics were started before the procedure and continued for 24 hours. They were admitted after surgery to the orthopedic floor.  There were no complications during the hospital stay. All home medications were continued.        Routine consults were obtained from Physical Therapy for twice daily PT, and followed by Medicine for Co-management. Patient was placed on Xarelto for anticoagulation.  Pertinent home medications were continued.  Daily labs were followed.  The patient received 1 unit of packed red blood cells for post-operative blood loss with elevation in hemoglobin on repeat labs.        On POD 0 there were no major issues. Pt received PT daily, and a new Aquacel dressing was applied prior to discharge. The pt will likely need DC to Rehab for ongoing PT once evaluated and ok per Medicine.   The orthopedic Attending is aware and agrees. See addendum to DC summary per medical team below for any additional info or if any changes.

## 2019-06-26 NOTE — PROVIDER CONTACT NOTE (OTHER) - ACTION/TREATMENT ORDERED:
MD aware, if pt is comfortable let no interventions at this time. Rescan bladder if no void in 2-3 hours.

## 2019-06-26 NOTE — PHYSICAL THERAPY INITIAL EVALUATION ADULT - GAIT DEVIATIONS NOTED, PT EVAL
decreased velocity of limb motion/increased time in double stance/decreased farhana/decreased step length/decreased stride length/decreased weight-shifting ability

## 2019-06-27 LAB
ANION GAP SERPL CALC-SCNC: 9 MMOL/L — SIGNIFICANT CHANGE UP (ref 5–17)
BUN SERPL-MCNC: 13 MG/DL — SIGNIFICANT CHANGE UP (ref 7–23)
CALCIUM SERPL-MCNC: 8 MG/DL — LOW (ref 8.4–10.5)
CHLORIDE SERPL-SCNC: 106 MMOL/L — SIGNIFICANT CHANGE UP (ref 96–108)
CO2 SERPL-SCNC: 26 MMOL/L — SIGNIFICANT CHANGE UP (ref 22–31)
CREAT SERPL-MCNC: 0.85 MG/DL — SIGNIFICANT CHANGE UP (ref 0.5–1.3)
GLUCOSE SERPL-MCNC: 109 MG/DL — HIGH (ref 70–99)
HCT VFR BLD CALC: 23.9 % — LOW (ref 34.5–45)
HGB BLD-MCNC: 7.4 G/DL — LOW (ref 11.5–15.5)
MCHC RBC-ENTMCNC: 29.7 PG — SIGNIFICANT CHANGE UP (ref 27–34)
MCHC RBC-ENTMCNC: 31 GM/DL — LOW (ref 32–36)
MCV RBC AUTO: 95.6 FL — SIGNIFICANT CHANGE UP (ref 80–100)
PLATELET # BLD AUTO: 193 K/UL — SIGNIFICANT CHANGE UP (ref 150–400)
POTASSIUM SERPL-MCNC: 3.1 MMOL/L — LOW (ref 3.5–5.3)
POTASSIUM SERPL-SCNC: 3.1 MMOL/L — LOW (ref 3.5–5.3)
RBC # BLD: 2.5 M/UL — LOW (ref 3.8–5.2)
RBC # FLD: 14.3 % — SIGNIFICANT CHANGE UP (ref 10.3–14.5)
SODIUM SERPL-SCNC: 141 MMOL/L — SIGNIFICANT CHANGE UP (ref 135–145)
WBC # BLD: 7.7 K/UL — SIGNIFICANT CHANGE UP (ref 3.8–10.5)
WBC # FLD AUTO: 7.7 K/UL — SIGNIFICANT CHANGE UP (ref 3.8–10.5)

## 2019-06-27 RX ADMIN — RIVAROXABAN 10 MILLIGRAM(S): KIT at 12:38

## 2019-06-27 RX ADMIN — Medication 20 MILLIGRAM(S): at 17:23

## 2019-06-27 RX ADMIN — Medication 975 MILLIGRAM(S): at 06:10

## 2019-06-27 RX ADMIN — Medication 975 MILLIGRAM(S): at 13:12

## 2019-06-27 RX ADMIN — Medication 975 MILLIGRAM(S): at 21:58

## 2019-06-27 RX ADMIN — Medication 100 MILLIGRAM(S): at 22:14

## 2019-06-27 RX ADMIN — Medication 100 MILLIGRAM(S): at 21:58

## 2019-06-27 RX ADMIN — Medication 100 MILLIGRAM(S): at 14:33

## 2019-06-27 RX ADMIN — CHLORHEXIDINE GLUCONATE 1 APPLICATION(S): 213 SOLUTION TOPICAL at 12:38

## 2019-06-27 RX ADMIN — Medication 975 MILLIGRAM(S): at 22:28

## 2019-06-27 RX ADMIN — Medication 100 MILLIGRAM(S): at 12:38

## 2019-06-27 RX ADMIN — Medication 975 MILLIGRAM(S): at 12:42

## 2019-06-27 RX ADMIN — Medication 100 MILLIGRAM(S): at 06:08

## 2019-06-27 RX ADMIN — Medication 120 MILLIGRAM(S): at 06:09

## 2019-06-27 RX ADMIN — SENNA PLUS 2 TABLET(S): 8.6 TABLET ORAL at 21:58

## 2019-06-27 RX ADMIN — Medication 20 MILLIGRAM(S): at 06:10

## 2019-06-27 RX ADMIN — ATORVASTATIN CALCIUM 40 MILLIGRAM(S): 80 TABLET, FILM COATED ORAL at 21:58

## 2019-06-27 NOTE — PROGRESS NOTE ADULT - SUBJECTIVE AND OBJECTIVE BOX
Pt seen and examined at bedside. No acute events overnight. Per nursing pt slept and is now less disoriented. Now able to free void. Pain tolerable with Rx.     Vital Signs Last 24 Hrs  T(C): 36.6 (27 Jun 2019 06:10), Max: 36.8 (27 Jun 2019 01:42)  T(F): 97.8 (27 Jun 2019 06:10), Max: 98.2 (27 Jun 2019 01:42)  HR: 72 (27 Jun 2019 06:10) (72 - 88)  BP: 117/61 (27 Jun 2019 06:10) (100/55 - 155/68)  BP(mean): 96 (26 Jun 2019 07:00) (96 - 96)  RR: 16 (27 Jun 2019 06:10) (16 - 18)  SpO2: 94% (27 Jun 2019 06:10) (92% - 96%)    PE RLE:  dressing overlying hip c/d/i  SCDs in place bilaterally  b/l calf NTTP bilaterally  +ehl/fhl/gs/ta  SILT L2-S1  +DP/PT  compartments soft

## 2019-06-27 NOTE — OCCUPATIONAL THERAPY INITIAL EVALUATION ADULT - LIVES WITH, PROFILE
Pt reports lives in an apartment with 1 flight of stairs to enter, ambulates with RW and WC for long distances, independent/supervision for most ADLs. Daughter is able to provide assistance

## 2019-06-27 NOTE — PROGRESS NOTE ADULT - ASSESSMENT
89yo F s/p R IMN, now POD 2  FU AM Labs  Pain Control: IV/PO Opioids  DVT PPx: SCDs, Xarelto  PT/OT: WBAT  Rest, Ice, Elevate  Incentive Spirometry, OOB  D/c planning: SMITHA

## 2019-06-27 NOTE — OCCUPATIONAL THERAPY INITIAL EVALUATION ADULT - PERTINENT HX OF CURRENT PROBLEM, REHAB EVAL
89y Female [facility] pain and inability to ambulate.  Patient denies headstrike or LOC. Localizes pain to R hip/femur. Patient is a community ambulator at baseline with assistive devices. Patient has no issues w/ ADLs/IADLs. CT Pelvis: Mildly impacted right intertrochanteric fracture. s/p IMN 6/26

## 2019-06-28 ENCOUNTER — TRANSCRIPTION ENCOUNTER (OUTPATIENT)
Age: 84
End: 2019-06-28

## 2019-06-28 VITALS
DIASTOLIC BLOOD PRESSURE: 56 MMHG | RESPIRATION RATE: 17 BRPM | OXYGEN SATURATION: 95 % | TEMPERATURE: 98 F | HEART RATE: 76 BPM | SYSTOLIC BLOOD PRESSURE: 122 MMHG

## 2019-06-28 LAB
ANION GAP SERPL CALC-SCNC: 10 MMOL/L — SIGNIFICANT CHANGE UP (ref 5–17)
BUN SERPL-MCNC: 17 MG/DL — SIGNIFICANT CHANGE UP (ref 7–23)
CALCIUM SERPL-MCNC: 7.9 MG/DL — LOW (ref 8.4–10.5)
CHLORIDE SERPL-SCNC: 105 MMOL/L — SIGNIFICANT CHANGE UP (ref 96–108)
CO2 SERPL-SCNC: 25 MMOL/L — SIGNIFICANT CHANGE UP (ref 22–31)
CREAT SERPL-MCNC: 0.93 MG/DL — SIGNIFICANT CHANGE UP (ref 0.5–1.3)
GLUCOSE SERPL-MCNC: 106 MG/DL — HIGH (ref 70–99)
HCT VFR BLD CALC: 26.5 % — LOW (ref 34.5–45)
HGB BLD-MCNC: 8.8 G/DL — LOW (ref 11.5–15.5)
MCHC RBC-ENTMCNC: 29.7 PG — SIGNIFICANT CHANGE UP (ref 27–34)
MCHC RBC-ENTMCNC: 33.1 GM/DL — SIGNIFICANT CHANGE UP (ref 32–36)
MCV RBC AUTO: 89.8 FL — SIGNIFICANT CHANGE UP (ref 80–100)
PLATELET # BLD AUTO: 199 K/UL — SIGNIFICANT CHANGE UP (ref 150–400)
POTASSIUM SERPL-MCNC: 3.1 MMOL/L — LOW (ref 3.5–5.3)
POTASSIUM SERPL-SCNC: 3.1 MMOL/L — LOW (ref 3.5–5.3)
RBC # BLD: 2.95 M/UL — LOW (ref 3.8–5.2)
RBC # FLD: 14.7 % — HIGH (ref 10.3–14.5)
SODIUM SERPL-SCNC: 140 MMOL/L — SIGNIFICANT CHANGE UP (ref 135–145)
WBC # BLD: 8.3 K/UL — SIGNIFICANT CHANGE UP (ref 3.8–10.5)
WBC # FLD AUTO: 8.3 K/UL — SIGNIFICANT CHANGE UP (ref 3.8–10.5)

## 2019-06-28 PROCEDURE — 93306 TTE W/DOPPLER COMPLETE: CPT

## 2019-06-28 PROCEDURE — 86923 COMPATIBILITY TEST ELECTRIC: CPT

## 2019-06-28 PROCEDURE — 85730 THROMBOPLASTIN TIME PARTIAL: CPT

## 2019-06-28 PROCEDURE — 97166 OT EVAL MOD COMPLEX 45 MIN: CPT

## 2019-06-28 PROCEDURE — 80048 BASIC METABOLIC PNL TOTAL CA: CPT

## 2019-06-28 PROCEDURE — 36430 TRANSFUSION BLD/BLD COMPNT: CPT

## 2019-06-28 PROCEDURE — 85610 PROTHROMBIN TIME: CPT

## 2019-06-28 PROCEDURE — 76377 3D RENDER W/INTRP POSTPROCES: CPT

## 2019-06-28 PROCEDURE — C1889: CPT

## 2019-06-28 PROCEDURE — 96374 THER/PROPH/DIAG INJ IV PUSH: CPT

## 2019-06-28 PROCEDURE — 72192 CT PELVIS W/O DYE: CPT

## 2019-06-28 PROCEDURE — 86900 BLOOD TYPING SEROLOGIC ABO: CPT

## 2019-06-28 PROCEDURE — 73080 X-RAY EXAM OF ELBOW: CPT

## 2019-06-28 PROCEDURE — 70450 CT HEAD/BRAIN W/O DYE: CPT

## 2019-06-28 PROCEDURE — C1713: CPT

## 2019-06-28 PROCEDURE — 83880 ASSAY OF NATRIURETIC PEPTIDE: CPT

## 2019-06-28 PROCEDURE — 71045 X-RAY EXAM CHEST 1 VIEW: CPT

## 2019-06-28 PROCEDURE — 72125 CT NECK SPINE W/O DYE: CPT

## 2019-06-28 PROCEDURE — 97162 PT EVAL MOD COMPLEX 30 MIN: CPT

## 2019-06-28 PROCEDURE — 80053 COMPREHEN METABOLIC PANEL: CPT

## 2019-06-28 PROCEDURE — 76000 FLUOROSCOPY <1 HR PHYS/QHP: CPT

## 2019-06-28 PROCEDURE — P9016: CPT

## 2019-06-28 PROCEDURE — 85027 COMPLETE CBC AUTOMATED: CPT

## 2019-06-28 PROCEDURE — 93005 ELECTROCARDIOGRAM TRACING: CPT

## 2019-06-28 PROCEDURE — 73552 X-RAY EXAM OF FEMUR 2/>: CPT

## 2019-06-28 PROCEDURE — 99285 EMERGENCY DEPT VISIT HI MDM: CPT | Mod: 25

## 2019-06-28 PROCEDURE — 94640 AIRWAY INHALATION TREATMENT: CPT

## 2019-06-28 PROCEDURE — 86901 BLOOD TYPING SEROLOGIC RH(D): CPT

## 2019-06-28 PROCEDURE — 86850 RBC ANTIBODY SCREEN: CPT

## 2019-06-28 PROCEDURE — 73502 X-RAY EXAM HIP UNI 2-3 VIEWS: CPT

## 2019-06-28 PROCEDURE — 97530 THERAPEUTIC ACTIVITIES: CPT

## 2019-06-28 PROCEDURE — 97110 THERAPEUTIC EXERCISES: CPT

## 2019-06-28 RX ORDER — ACETAMINOPHEN 500 MG
3 TABLET ORAL
Qty: 0 | Refills: 0 | DISCHARGE
Start: 2019-06-28

## 2019-06-28 RX ORDER — RIVAROXABAN 15 MG-20MG
1 KIT ORAL
Qty: 0 | Refills: 0 | DISCHARGE
Start: 2019-06-28

## 2019-06-28 RX ORDER — SENNA PLUS 8.6 MG/1
2 TABLET ORAL
Qty: 0 | Refills: 0 | DISCHARGE
Start: 2019-06-28

## 2019-06-28 RX ORDER — DOCUSATE SODIUM 100 MG
1 CAPSULE ORAL
Qty: 0 | Refills: 0 | DISCHARGE
Start: 2019-06-28

## 2019-06-28 RX ORDER — POTASSIUM CHLORIDE 20 MEQ
40 PACKET (EA) ORAL EVERY 4 HOURS
Refills: 0 | Status: COMPLETED | OUTPATIENT
Start: 2019-06-28 | End: 2019-06-28

## 2019-06-28 RX ORDER — OXYCODONE HYDROCHLORIDE 5 MG/1
1 TABLET ORAL
Qty: 0 | Refills: 0 | DISCHARGE
Start: 2019-06-28

## 2019-06-28 RX ADMIN — Medication 40 MILLIEQUIVALENT(S): at 09:28

## 2019-06-28 RX ADMIN — Medication 40 MILLIEQUIVALENT(S): at 13:23

## 2019-06-28 RX ADMIN — Medication 100 MILLIGRAM(S): at 13:23

## 2019-06-28 RX ADMIN — Medication 100 MILLIGRAM(S): at 06:10

## 2019-06-28 RX ADMIN — Medication 20 MILLIGRAM(S): at 06:10

## 2019-06-28 RX ADMIN — Medication 975 MILLIGRAM(S): at 06:40

## 2019-06-28 RX ADMIN — Medication 120 MILLIGRAM(S): at 06:10

## 2019-06-28 RX ADMIN — Medication 975 MILLIGRAM(S): at 06:10

## 2019-06-28 RX ADMIN — Medication 975 MILLIGRAM(S): at 13:23

## 2019-06-28 RX ADMIN — CHLORHEXIDINE GLUCONATE 1 APPLICATION(S): 213 SOLUTION TOPICAL at 12:13

## 2019-06-28 RX ADMIN — RIVAROXABAN 10 MILLIGRAM(S): KIT at 12:12

## 2019-06-28 RX ADMIN — Medication 100 MILLIGRAM(S): at 13:22

## 2019-06-28 NOTE — DISCHARGE NOTE NURSING/CASE MANAGEMENT/SOCIAL WORK - NSDCDPATPORTLINK_GEN_ALL_CORE
You can access the [facility] Patient Portal, offered by [facility], by registering with the following website: [email or web address]

## 2019-06-28 NOTE — PROGRESS NOTE ADULT - ASSESSMENT
A/P 89y year old female s/p Intramedullary nailing for fracture of right femur    Pain Control  DVT PPX  PT/OOB  WBAT   FU AM labs  Dispo Planning - SMITHA

## 2019-06-28 NOTE — PROGRESS NOTE ADULT - SUBJECTIVE AND OBJECTIVE BOX
Orthopedic Surgery Progress Note    Patient seen and examined this morning. No acute events overnight. Pain is well controlled. Denies f/c, chest pain, sob, dizziness. Received 1 unit yesterday    O:  Vital Signs Last 24 Hrs  T(C): 36.5 (28 Jun 2019 05:52), Max: 36.9 (28 Jun 2019 01:41)  T(F): 97.7 (28 Jun 2019 05:52), Max: 98.4 (28 Jun 2019 01:41)  HR: 71 (28 Jun 2019 05:52) (66 - 74)  BP: 148/77 (28 Jun 2019 05:52) (94/53 - 148/77)  BP(mean): --  RR: 18 (28 Jun 2019 05:52) (17 - 19)  SpO2: 94% (28 Jun 2019 05:52) (92% - 99%)    Gen: NAD  RLE  Dressing C/D/I  EHL/FHL/TA/GS intact  SILT DP/SP/SEARS/Sa  WWP distally    Labs:                        7.4    7.7   )-----------( 193      ( 27 Jun 2019 07:08 )             23.9       06-27    141  |  106  |  13  ----------------------------<  109<H>  3.1<L>   |  26  |  0.85

## 2019-08-08 ENCOUNTER — APPOINTMENT (OUTPATIENT)
Dept: GERIATRICS | Facility: CLINIC | Age: 84
End: 2019-08-08
Payer: MEDICARE

## 2019-08-08 ENCOUNTER — APPOINTMENT (OUTPATIENT)
Dept: GERIATRICS | Facility: CLINIC | Age: 84
End: 2019-08-08

## 2019-08-08 VITALS
TEMPERATURE: 97.8 F | HEIGHT: 60 IN | RESPIRATION RATE: 16 BRPM | BODY MASS INDEX: 25.52 KG/M2 | SYSTOLIC BLOOD PRESSURE: 110 MMHG | DIASTOLIC BLOOD PRESSURE: 50 MMHG | WEIGHT: 130 LBS | HEART RATE: 80 BPM | OXYGEN SATURATION: 96 %

## 2019-08-08 DIAGNOSIS — Z87.81 PERSONAL HISTORY OF (HEALED) TRAUMATIC FRACTURE: ICD-10-CM

## 2019-08-08 DIAGNOSIS — I10 ESSENTIAL (PRIMARY) HYPERTENSION: ICD-10-CM

## 2019-08-08 DIAGNOSIS — Z29.9 ENCOUNTER FOR PROPHYLACTIC MEASURES, UNSPECIFIED: ICD-10-CM

## 2019-08-08 DIAGNOSIS — I51.9 HEART DISEASE, UNSPECIFIED: ICD-10-CM

## 2019-08-08 PROCEDURE — 99214 OFFICE O/P EST MOD 30 MIN: CPT | Mod: GC

## 2019-08-08 RX ORDER — VERAPAMIL HYDROCHLORIDE 120 MG/1
120 TABLET ORAL DAILY
Refills: 0 | Status: DISCONTINUED | COMMUNITY
Start: 2019-05-23 | End: 2019-08-08

## 2019-08-08 RX ORDER — MECLIZINE HYDROCHLORIDE 12.5 MG/1
12.5 TABLET ORAL
Refills: 0 | Status: DISCONTINUED | COMMUNITY
Start: 2019-05-23 | End: 2019-08-08

## 2019-08-10 ENCOUNTER — INPATIENT (INPATIENT)
Facility: HOSPITAL | Age: 84
LOS: 2 days | Discharge: HOME | DRG: 812 | End: 2019-08-13
Attending: HOSPITALIST | Admitting: HOSPITALIST
Payer: MEDICARE

## 2019-08-10 VITALS
OXYGEN SATURATION: 96 % | WEIGHT: 119.93 LBS | TEMPERATURE: 98 F | DIASTOLIC BLOOD PRESSURE: 52 MMHG | HEIGHT: 60 IN | RESPIRATION RATE: 18 BRPM | SYSTOLIC BLOOD PRESSURE: 125 MMHG | HEART RATE: 82 BPM

## 2019-08-10 DIAGNOSIS — I51.89 OTHER ILL-DEFINED HEART DISEASES: ICD-10-CM

## 2019-08-10 DIAGNOSIS — D64.9 ANEMIA, UNSPECIFIED: ICD-10-CM

## 2019-08-10 DIAGNOSIS — D62 ACUTE POSTHEMORRHAGIC ANEMIA: ICD-10-CM

## 2019-08-10 DIAGNOSIS — S72.009A FRACTURE OF UNSPECIFIED PART OF NECK OF UNSPECIFIED FEMUR, INITIAL ENCOUNTER FOR CLOSED FRACTURE: ICD-10-CM

## 2019-08-10 DIAGNOSIS — I25.10 ATHEROSCLEROTIC HEART DISEASE OF NATIVE CORONARY ARTERY WITHOUT ANGINA PECTORIS: ICD-10-CM

## 2019-08-10 DIAGNOSIS — J45.20 MILD INTERMITTENT ASTHMA, UNCOMPLICATED: ICD-10-CM

## 2019-08-10 DIAGNOSIS — D50.0 IRON DEFICIENCY ANEMIA SECONDARY TO BLOOD LOSS (CHRONIC): ICD-10-CM

## 2019-08-10 DIAGNOSIS — Z98.890 OTHER SPECIFIED POSTPROCEDURAL STATES: Chronic | ICD-10-CM

## 2019-08-10 DIAGNOSIS — Z29.9 ENCOUNTER FOR PROPHYLACTIC MEASURES, UNSPECIFIED: ICD-10-CM

## 2019-08-10 DIAGNOSIS — K92.2 GASTROINTESTINAL HEMORRHAGE, UNSPECIFIED: ICD-10-CM

## 2019-08-10 LAB
ALBUMIN SERPL ELPH-MCNC: 3.4 G/DL — SIGNIFICANT CHANGE UP (ref 3.3–5)
ALP SERPL-CCNC: 108 U/L — SIGNIFICANT CHANGE UP (ref 40–120)
ALT FLD-CCNC: 11 U/L — SIGNIFICANT CHANGE UP (ref 10–45)
ANION GAP SERPL CALC-SCNC: 10 MMOL/L — SIGNIFICANT CHANGE UP (ref 5–17)
APTT BLD: 25.4 SEC — LOW (ref 27.5–36.3)
AST SERPL-CCNC: 12 U/L — SIGNIFICANT CHANGE UP (ref 10–40)
BASOPHILS # BLD AUTO: 0 K/UL — SIGNIFICANT CHANGE UP (ref 0–0.2)
BASOPHILS NFR BLD AUTO: 0.4 % — SIGNIFICANT CHANGE UP (ref 0–2)
BILIRUB SERPL-MCNC: 0.2 MG/DL — SIGNIFICANT CHANGE UP (ref 0.2–1.2)
BLD GP AB SCN SERPL QL: NEGATIVE — SIGNIFICANT CHANGE UP
BUN SERPL-MCNC: 26 MG/DL — HIGH (ref 7–23)
CALCIUM SERPL-MCNC: 9 MG/DL — SIGNIFICANT CHANGE UP (ref 8.4–10.5)
CHLORIDE SERPL-SCNC: 111 MMOL/L — HIGH (ref 96–108)
CO2 SERPL-SCNC: 22 MMOL/L — SIGNIFICANT CHANGE UP (ref 22–31)
CREAT SERPL-MCNC: 0.82 MG/DL — SIGNIFICANT CHANGE UP (ref 0.5–1.3)
EOSINOPHIL # BLD AUTO: 0.2 K/UL — SIGNIFICANT CHANGE UP (ref 0–0.5)
EOSINOPHIL NFR BLD AUTO: 2.4 % — SIGNIFICANT CHANGE UP (ref 0–6)
GLUCOSE SERPL-MCNC: 93 MG/DL — SIGNIFICANT CHANGE UP (ref 70–99)
HCT VFR BLD CALC: 17.4 % — CRITICAL LOW (ref 34.5–45)
HGB BLD-MCNC: 5.4 G/DL — CRITICAL LOW (ref 11.5–15.5)
HYPOCHROMIA BLD QL: SIGNIFICANT CHANGE UP
INR BLD: 0.97 RATIO — SIGNIFICANT CHANGE UP (ref 0.88–1.16)
LYMPHOCYTES # BLD AUTO: 0.8 K/UL — LOW (ref 1–3.3)
LYMPHOCYTES # BLD AUTO: 9.8 % — LOW (ref 13–44)
MCHC RBC-ENTMCNC: 26 PG — LOW (ref 27–34)
MCHC RBC-ENTMCNC: 31.4 GM/DL — LOW (ref 32–36)
MCV RBC AUTO: 83 FL — SIGNIFICANT CHANGE UP (ref 80–100)
MONOCYTES # BLD AUTO: 0.8 K/UL — SIGNIFICANT CHANGE UP (ref 0–0.9)
MONOCYTES NFR BLD AUTO: 10.2 % — SIGNIFICANT CHANGE UP (ref 2–14)
NEUTROPHILS # BLD AUTO: 6.2 K/UL — SIGNIFICANT CHANGE UP (ref 1.8–7.4)
NEUTROPHILS NFR BLD AUTO: 77.2 % — HIGH (ref 43–77)
OB PNL STL: POSITIVE
PLAT MORPH BLD: NORMAL — SIGNIFICANT CHANGE UP
PLATELET # BLD AUTO: 327 K/UL — SIGNIFICANT CHANGE UP (ref 150–400)
POLYCHROMASIA BLD QL SMEAR: SLIGHT — SIGNIFICANT CHANGE UP
POTASSIUM SERPL-MCNC: 5.1 MMOL/L — SIGNIFICANT CHANGE UP (ref 3.5–5.3)
POTASSIUM SERPL-SCNC: 5.1 MMOL/L — SIGNIFICANT CHANGE UP (ref 3.5–5.3)
PROT SERPL-MCNC: 5.5 G/DL — LOW (ref 6–8.3)
PROTHROM AB SERPL-ACNC: 11.2 SEC — SIGNIFICANT CHANGE UP (ref 10–12.9)
RBC # BLD: 2.09 M/UL — LOW (ref 3.8–5.2)
RBC # FLD: 16 % — HIGH (ref 10.3–14.5)
RBC BLD AUTO: ABNORMAL
RH IG SCN BLD-IMP: POSITIVE — SIGNIFICANT CHANGE UP
SODIUM SERPL-SCNC: 143 MMOL/L — SIGNIFICANT CHANGE UP (ref 135–145)
TARGETS BLD QL SMEAR: SLIGHT — SIGNIFICANT CHANGE UP
WBC # BLD: 8 K/UL — SIGNIFICANT CHANGE UP (ref 3.8–10.5)
WBC # FLD AUTO: 8 K/UL — SIGNIFICANT CHANGE UP (ref 3.8–10.5)

## 2019-08-10 PROCEDURE — 99291 CRITICAL CARE FIRST HOUR: CPT

## 2019-08-10 PROCEDURE — 99223 1ST HOSP IP/OBS HIGH 75: CPT | Mod: GC

## 2019-08-10 PROCEDURE — 74176 CT ABD & PELVIS W/O CONTRAST: CPT | Mod: 26

## 2019-08-10 RX ORDER — VERAPAMIL HCL 240 MG
1 CAPSULE, EXTENDED RELEASE PELLETS 24 HR ORAL
Qty: 0 | Refills: 0 | DISCHARGE

## 2019-08-10 RX ORDER — FUROSEMIDE 40 MG
40 TABLET ORAL ONCE
Refills: 0 | Status: COMPLETED | OUTPATIENT
Start: 2019-08-10 | End: 2019-08-10

## 2019-08-10 RX ORDER — FUROSEMIDE 40 MG
20 TABLET ORAL ONCE
Refills: 0 | Status: COMPLETED | OUTPATIENT
Start: 2019-08-10 | End: 2019-08-11

## 2019-08-10 RX ORDER — PANTOPRAZOLE SODIUM 20 MG/1
40 TABLET, DELAYED RELEASE ORAL EVERY 12 HOURS
Refills: 0 | Status: DISCONTINUED | OUTPATIENT
Start: 2019-08-10 | End: 2019-08-13

## 2019-08-10 RX ORDER — PANTOPRAZOLE SODIUM 20 MG/1
40 TABLET, DELAYED RELEASE ORAL ONCE
Refills: 0 | Status: COMPLETED | OUTPATIENT
Start: 2019-08-10 | End: 2019-08-10

## 2019-08-10 RX ORDER — ATORVASTATIN CALCIUM 80 MG/1
40 TABLET, FILM COATED ORAL AT BEDTIME
Refills: 0 | Status: DISCONTINUED | OUTPATIENT
Start: 2019-08-10 | End: 2019-08-13

## 2019-08-10 RX ORDER — IPRATROPIUM/ALBUTEROL SULFATE 18-103MCG
3 AEROSOL WITH ADAPTER (GRAM) INHALATION EVERY 6 HOURS
Refills: 0 | Status: DISCONTINUED | OUTPATIENT
Start: 2019-08-10 | End: 2019-08-13

## 2019-08-10 RX ORDER — FUROSEMIDE 40 MG
40 TABLET ORAL ONCE
Refills: 0 | Status: COMPLETED | OUTPATIENT
Start: 2019-08-10 | End: 2020-07-08

## 2019-08-10 RX ORDER — FERROUS SULFATE 325(65) MG
325 TABLET ORAL DAILY
Refills: 0 | Status: DISCONTINUED | OUTPATIENT
Start: 2019-08-10 | End: 2019-08-13

## 2019-08-10 RX ADMIN — Medication 40 MILLIGRAM(S): at 16:21

## 2019-08-10 RX ADMIN — PANTOPRAZOLE SODIUM 40 MILLIGRAM(S): 20 TABLET, DELAYED RELEASE ORAL at 12:32

## 2019-08-10 RX ADMIN — PANTOPRAZOLE SODIUM 40 MILLIGRAM(S): 20 TABLET, DELAYED RELEASE ORAL at 16:20

## 2019-08-10 RX ADMIN — ATORVASTATIN CALCIUM 40 MILLIGRAM(S): 80 TABLET, FILM COATED ORAL at 21:54

## 2019-08-10 NOTE — ED ADULT NURSE NOTE - NS ED NURSE REPORT GIVEN TO FT
Patient scheduled to see Lab on 3/27/2019 for Pre-visit labs.  Patient does not qualify per Pre-visit protocol.  Please place future orders, or call and advise patient to cancel Lab appointment.      
Please review and sign lab orders  Lab 3/27/19  OV 4/1/19  Physical 4/3/19  Eliza Mallory,   6  
PARVIN Quiros

## 2019-08-10 NOTE — ED PROVIDER NOTE - CLINICAL SUMMARY MEDICAL DECISION MAKING FREE TEXT BOX
89F sent in by lab for low hemoglobin, hx of right femur fx 1 mo ago treated with IMN given Xarelto as DVT prophylaxis, has hx of gi bleed on asa in the past, on exam today with melanotic stool, pale appearance, will send labs, check hemo-occult, admit.

## 2019-08-10 NOTE — ED ADULT NURSE REASSESSMENT NOTE - NS ED NURSE REASSESS COMMENT FT1
PRBC administered. Pt consent in chart. Risks and benefits of administering product explained to pt. Pt verbalized understanding of risks and benefits. VSS. Second RN at beside for confirmation of product and pt identification. Will cont to monitor.

## 2019-08-10 NOTE — CONSULT NOTE ADULT - SUBJECTIVE AND OBJECTIVE BOX
Chief Complaint: Hip pain    HPI:    89 y/o F w/ hx of CAD s/p CABG > 20 years ago, HFpEF, HTN, asthma, DVT on rivaroxaban (stopped 19) and with right hip fracture s/p IMN on 19, presenting with anemia; GI consulted due to concern for GI bleeding.     The patient denies bloody emesis, coffee ground emesis, bloody stools, and black tarry stools. Her only complaint in hip pain at the site of her prior hip fracture. She does endorse feeling more and more tired over the past several weeks. She denies nausea/vomiting and abdominal pain. She denies chest pain, shortness of breath, palpitations, and light headedness. The patient is not sure which medications she takes - she takes "what she is given."     The patient endorses a normal upper endoscopy and colonoscopy > 10 years ago.    The patient denies prior history of GI bleeding, however, per chart review the patient's daughter believes the patient had ulcers that were treated on upper endoscopy.    Allergies:  aspirin (Other)    Home Medications:  · 	atorvastatin 40 mg oral tablet: Last Dose Taken:  , 1 tab(s) orally once a day  · 	Lasix 20 mg oral tablet: Last Dose Taken:  , 1 tab(s) orally 2 times a day  · 	Ventolin 5 mg/mL (0.5%) inhalation solution: Last Dose Taken:  , 0.5 milliliter(s) inhaled every 6 hours, As Needed  · 	Carafate 1 g/10 mL oral suspension: Last Dose Taken:  , 10 milliliter(s) orally 4 times a day (before meals and at bedtime)  · 	ferrous sulfate 325 mg (65 mg elemental iron) oral tablet: Last Dose Taken:  , 1 tab(s) orally once a day  On rivaroxaban until 19    Hospital Medications:  ALBUTerol/ipratropium for Nebulization 3 milliLiter(s) Nebulizer every 6 hours PRN  atorvastatin 40 milliGRAM(s) Oral at bedtime  ferrous    sulfate 325 milliGRAM(s) Oral daily  pantoprazole  Injectable 40 milliGRAM(s) IV Push every 12 hours    PMHX/PSHX:   Status post hip surgery    Family history:      Denies family history of colon cancer/polyps, stomach cancer/polyps, pancreatic cancer/masses, liver cancer/disease, ovarian cancer and endometrial cancer.    Social History:     Tob: Denies  EtOH: Denies  Illicit Drugs: Denies    ROS:     General:  No wt loss, fevers, chills, night sweats, fatigue  Eyes:  Good vision, no reported pain  ENT:  No sore throat, pain, runny nose, dysphagia  CV:  No pain, palpitations, hypo/hypertension  Pulm:  No dyspnea, cough, tachypnea, wheezing  GI:  No pain, No nausea, No vomiting, No diarrhea, No constipation, No weight loss, No fever, No pruritis, No rectal bleeding, No tarry stools, No dysphagia,  :  No pain, bleeding, incontinence, nocturia  Muscle:  No pain, weakness  Neuro:  No weakness, tingling, memory problems  Psych:  No fatigue, insomnia, mood problems, depression  Endocrine:  No polyuria, polydipsia, cold/heat intolerance  Heme:  No petechiae, ecchymosis, easy bruisability  Skin:  No rash, tattoos, scars, edema    PHYSICAL EXAM:     GENERAL:  No acute distress  HEENT:  Normocephalic/atraumatic, no scleral icterus, in soft neck collar  CHEST:  Clear to auscultation bilaterally, no wheezes/rales/ronchi, no accessory muscle use  HEART:  Regular rate and rhythm, no murmurs/rubs/gallops  ABDOMEN:  Soft, non-tender, non-distended, normoactive bowel sounds, reducible ventral hernia  RECTAL: Dark brown stool on rectal exam  EXTREMITIES: No cyanosis, clubbing, or edema  SKIN:  No rash/erythema  NEURO:  Alert and oriented x 3    Vital Signs:  Vital Signs Last 24 Hrs  T(C): 36.5 (10 Aug 2019 16:51), Max: 36.7 (10 Aug 2019 12:29)  T(F): 97.7 (10 Aug 2019 16:51), Max: 98.1 (10 Aug 2019 12:29)  HR: 82 (10 Aug 2019 16:51) (78 - 87)  BP: 146/55 (10 Aug 2019 16:51) (125/52 - 148/64)  BP(mean): --  RR: 18 (10 Aug 2019 16:51) (16 - 18)  SpO2: 98% (10 Aug 2019 16:51) (96% - 100%)  Daily Height in cm: 152.4 (10 Aug 2019 14:07)    Daily Weight in k.2 (10 Aug 2019 14:07)    LABS:                        5.4    8.0   )-----------( 327      ( 10 Aug 2019 11:10 )             17.4     Mean Cell Volume: 83.0 fl (08-10-19 @ 11:10)    08-10    143  |  111<H>  |  26<H>  ----------------------------<  93  5.1   |  22  |  0.82    Ca    9.0      10 Aug 2019 11:10    TPro  5.5<L>  /  Alb  3.4  /  TBili  0.2  /  DBili  x   /  AST  12  /  ALT  11  /  AlkPhos  108  08-10    LIVER FUNCTIONS - ( 10 Aug 2019 11:10 )  Alb: 3.4 g/dL / Pro: 5.5 g/dL / ALK PHOS: 108 U/L / ALT: 11 U/L / AST: 12 U/L / GGT: x           PT/INR - ( 10 Aug 2019 11:10 )   PT: 11.2 sec;   INR: 0.97 ratio         PTT - ( 10 Aug 2019 11:10 )  PTT:25.4 sec               5.4    8.0   )-----------( 327      ( 10 Aug 2019 11:10 )             17.4     Imaging:    No imaging

## 2019-08-10 NOTE — ED PROVIDER NOTE - OBJECTIVE STATEMENT
Patient given a dose of Xarelto 2 days ago, has hx of GIB, notes that she went to her PMD for lab work yesterday and at that time had a cbc drawn for routine blood work. Found to have a hgb of 4.8, sent here for further evaluation / transfusion. Patient given a dose of Xarelto 2 days ago, has hx of ulcer causing GIB, uncertain which kind of ulcer-- here notes that she went to her PMD for lab work yesterday and at that time had a cbc drawn for routine blood work. Found to have a hgb of 4.8, sent here for further evaluation / transfusion. Here denies diarrhea, bloody bowel movements, black bowel movements, fevers, chills, or other new symptoms of concern.

## 2019-08-10 NOTE — H&P ADULT - PROBLEM SELECTOR PLAN 5
s/p IMN in 06/2019   - Pain control with tylenol PRN  - Ambulate as tolerated with assistance  - Fall precautions

## 2019-08-10 NOTE — ED PROVIDER NOTE - PHYSICAL EXAMINATION
Gen: NAD, non-toxic, conversational  Eyes: PERRL, EOMI   HENT: Normocephalic, atraumatic. External ears normal, no rhinorrhea, moist mucous membranes.   CV: RRR, no M/R/G  Resp: CTAB, non-labored, speaking without difficulty on room air  Abd: soft, non tender, non rigid, no guarding or rebound tenderness  Back: No CVAT bilaterally, no midline ttp  GI (chaperoned by pt's daughter): Melanotic stool.   Skin: dry, wwp   Neuro: AOx3, speech is fluent and appropriate  Psych: Mood good, affect euthymic

## 2019-08-10 NOTE — H&P ADULT - PROBLEM SELECTOR PLAN 2
UGI 2/2 ulcer vs lower GI bleed 2/2 diverticulosis vs AVM vs malignancy.  - GI consult placed  - Plan as above  - Patient/Daughter prefer for conservative management at this time, but will speak to GI. UGI 2/2 ulcer vs lower GI bleed 2/2 diverticulosis vs AVM vs malignancy.  - GI consult placed  - Plan as above  - Patient/Daughter prefer for conservative management at this time, but will discuss with GI.

## 2019-08-10 NOTE — H&P ADULT - PROBLEM SELECTOR PLAN 7
DVT Prophylaxis: SCDs in setting of GI bleed  Diet: NPO  Dispo: Pending PT DVT Prophylaxis: SCDs in setting of GI bleed  Diet: NPO  Dispo: Pending PT  GOC: DNR/DNI

## 2019-08-10 NOTE — H&P ADULT - HISTORY OF PRESENT ILLNESS
89 y/o F PMHx R hip fracture s/p IMN on 6/25/19, CAD s/p CABGx3 >20 years ago (not on aspirin due to hx of GIB), HFpEF, HTN, mild intermittent asthma, presents from home after being found to have hgb 4.8 on outpatient labs. Patient reports seeing her PCP Dr. Lou on 8/8. Patient recently admitted in June for R hip fx, had surgery with Ortho, was placed on Xarelto for DVT prophylaxis (10mg daily) which she was taking. Last took Xarelto on 8/8, was told to stop by her PCP. Patient denies any BRBPR or melena, but states she does not look closely. Last BM this morning. Endorses generalized fatigue greater than baseline over last few weeks. No diarrhea, N/V, abdominal pain, chest pain, or SOB. Does endorse having an episode of SOB a few days ago while sitting at rest, which resolved after 5 minutes following use of albuterol inhaler. Has chronic LE swelling. Has had colonoscopy/EGD in the past (>10 years ago), which showed ?ulcers which were cauterized, as per patient's daughter. Denies any aspirin use or NSAID use. Lives alone at home. Uses a walker to ambulate. No recent falls.       In the ED:  VS: Afebrile, /52, HR 82, RR 18, spo2 96% on RA  Labs: Significant for H/H 5.4/17.4 (baseline hgb around 9), INR 0.97  Imaging: None  Given: 1u pRBCs in the ED, pantoprazole 40mg IVP

## 2019-08-10 NOTE — H&P ADULT - ASSESSMENT
89 y/o F PMHx R hip fracture s/p IMN on 6/25/19, CAD s/p CABGx3 >20 years ago (not on aspirin due to hx of GIB), HFpEF, HTN, mild intermittent asthma, presents from home after being found to have hgb 4.8 on outpatient labs.

## 2019-08-10 NOTE — H&P ADULT - NSHPREVIEWOFSYSTEMS_GEN_ALL_CORE
REVIEW OF SYSTEMS:  CONSTITUTIONAL: No fever or weight loss, +fatigue  EYES: No eye pain or discharge  ENMT:  +difficulty hearing, No sinus or throat pain  NECK: No pain or stiffness  RESPIRATORY: No cough, wheezing, chills or hemoptysis; No shortness of breath  CARDIOVASCULAR: No chest pain, palpitations, or dizziness, +leg swelling  GASTROINTESTINAL: No abdominal or epigastric pain. No nausea, vomiting, or hematemesis; No diarrhea or constipation. No melena or hematochezia.  GENITOURINARY: No dysuria, frequency, hematuria, or incontinence  NEUROLOGICAL: No headache  SKIN: No itching, burning, rashes, or lesions   LYMPH NODES: No enlarged glands  ENDOCRINE: No heat or cold intolerance  MUSCULOSKELETAL: No joint pain or swelling  HEME/LYMPH: +easy bruising

## 2019-08-10 NOTE — H&P ADULT - NSHPSOCIALHISTORY_GEN_ALL_CORE
No tobacco, etOH, or drug use. Lives alone in private home. Has an aide during the day. Uses a walker

## 2019-08-10 NOTE — H&P ADULT - NSICDXPASTSURGICALHX_GEN_ALL_CORE_FT
PAST SURGICAL HISTORY:  No significant past surgical history PAST SURGICAL HISTORY:  Status post hip surgery

## 2019-08-10 NOTE — H&P ADULT - NSHPLABSRESULTS_GEN_ALL_CORE
08-10    143  |  111<H>  |  26<H>  ----------------------------<  93  5.1   |  22  |  0.82    Ca    9.0      10 Aug 2019 11:10    TPro  5.5<L>  /  Alb  3.4  /  TBili  0.2  /  DBili  x   /  AST  12  /  ALT  11  /  AlkPhos  108  08-10      PT/INR - ( 10 Aug 2019 11:10 )   PT: 11.2 sec;   INR: 0.97 ratio         PTT - ( 10 Aug 2019 11:10 )  PTT:25.4 sec                                        5.4    8.0   )-----------( 327      ( 10 Aug 2019 11:10 )             17.4     CAPILLARY BLOOD GLUCOSE        Radiology: None

## 2019-08-10 NOTE — ED PROVIDER NOTE - ATTENDING CONTRIBUTION TO CARE
I performed a history and physical exam of the patient and discussed their management with the resident.  I reviewed the resident's note and agree with the documented findings and plan of care except as noted below. My medical decision making and observations are as follows:    89F hx of CABG, GIBp/w anemia.  Pt was given a dose of Xarelto 2 days ago, has hx of ulcer causing GIB, went to her PMD for lab work yesterday and at that time had a cbc drawn for routine blood work. Found to have a hgb of 4.8, sent here for further evaluation / transfusion.  denies chest pain, abd pain.  Pt endorses feeling more fatigued recently.  denies shortness of breath.  Pt pale but in NAD, heart rrr, lungs CTA, abd soft ntnd, melena on rectal exam.  Given profound anemia and active GIB, patient needs labs, transfusion and admission.

## 2019-08-10 NOTE — PATIENT PROFILE ADULT - NSPRESCRUSEDDRG_GEN_A_NUR
Subjective:       Patient ID: Mariann Huff is a 58 y.o. female.    Chief Complaint: Follow-up    Mariann Huff is a 59yo female s/p PEG placement 5/2/19. She has not used her PEG in a number of months. She eats all her food and medication by mouth with no cough, choking or problems swallowing. She would like to have her PEG removed.     Review of Systems   Constitutional: Negative for chills and fever.   HENT: Negative for congestion and rhinorrhea.    Eyes: Negative for photophobia and visual disturbance.   Respiratory: Negative for cough and shortness of breath.    Cardiovascular: Negative for chest pain and palpitations.   Gastrointestinal: Negative for nausea and vomiting.   Endocrine: Negative for cold intolerance and heat intolerance.   Musculoskeletal: Negative for arthralgias and myalgias.   Skin: Negative for rash and wound.   Allergic/Immunologic: Negative for immunocompromised state.   Neurological: Negative for tremors and weakness.   Hematological: Negative for adenopathy.   Psychiatric/Behavioral: Negative for agitation.       Objective:      Physical Exam   Constitutional: She is oriented to person, place, and time. She appears well-developed and well-nourished. No distress.   HENT:   Head: Normocephalic and atraumatic.   Eyes: EOM are normal. No scleral icterus.   Neck: Normal range of motion. Neck supple.   Cardiovascular: Normal rate and regular rhythm.   Pulmonary/Chest: Effort normal. No respiratory distress.   Abdominal:   PEG in place - clean, dry and intact   Musculoskeletal: Normal range of motion. She exhibits no edema or tenderness.   Neurological: She is oriented to person, place, and time.   Skin: Skin is warm and dry.   Psychiatric: She has a normal mood and affect.       Assessment:   57 yo female w PEG, wanting removal  Plan:   Removed PEG in clinic, patient tolerated it well  Educated patient and family on PEG site care and hygiene  Drainage is expected, if drainage continues  for longer than 7 days she will return to clinic  Otherwise RTC PRN   No

## 2019-08-10 NOTE — ED ADULT NURSE NOTE - NSIMPLEMENTINTERV_GEN_ALL_ED
Implemented All Fall with Harm Risk Interventions:  [place] to call system. Call bell, personal items and telephone within reach. Instruct patient to call for assistance. Room bathroom lighting operational. Non-slip footwear when patient is off stretcher. Physically safe environment: no spills, clutter or unnecessary equipment. Stretcher in lowest position, wheels locked, appropriate side rails in place. Provide visual cue, wrist band, yellow gown, etc. Monitor gait and stability. Monitor for mental status changes and reorient to person, place, and time. Review medications for side effects contributing to fall risk. Reinforce activity limits and safety measures with patient and family. Provide visual clues: red socks.

## 2019-08-10 NOTE — ED ADULT TRIAGE NOTE - CHIEF COMPLAINT QUOTE
Blood drawn yesterday and showed abnormal hemoglobin of 4.8; No active bleeding and xarelto 2 days ago

## 2019-08-10 NOTE — ED PROVIDER NOTE - CRITICAL CARE PROVIDED
conducted a detailed discussion of DNR status/documentation/consult w/ pt's family directly relating to pts condition/interpretation of diagnostic studies/additional history taking/consultation with other physicians/direct patient care (not related to procedure)

## 2019-08-10 NOTE — H&P ADULT - PROBLEM SELECTOR PLAN 1
Presented following outpatient hgb 4.8 (baseline hgb 8-9). Patient denies any overt GI bleeding. Has been on Xarelto for DVT prophylaxis since recent hip surgery. Reports hx of endoscopies with cauterization in the past for ?ulcer  - Hgb 5.4 on admission  - FOBT positive  - s/p 1u pRBCs  - will give 2 more units pRBCs with caution for fluid overload, as patient already has bibasilar rales on exam.   - GI consult placed  - NPO for now  - received pantoprazole 40mg IVP in the ED. Will give another 40mg IVP now   - Active T&S  - 2 large bore IVs Presented following outpatient hgb 4.8 (baseline hgb 8-9). Patient denies any overt GI bleeding. Has been on Xarelto for DVT prophylaxis since recent hip surgery. Reports hx of endoscopies with cauterization in the past for ?ulcer.  - Hgb 5.4 on admission  - Currently HD stable  - FOBT positive  - s/p 1u pRBCs  - will give 2 more units pRBCs with caution for fluid overload, as patient already has bibasilar rales on exam.   - GI consult placed  - NPO for now  - received pantoprazole 40mg IVP in the ED. Will give another 40mg IVP now   - Active T&S  - 2 large bore IVs Presented following outpatient hgb 4.8 (baseline hgb 8-9). Patient denies any overt GI bleeding. Has been on Xarelto for DVT prophylaxis since recent hip surgery. Reports hx of endoscopies with cauterization in the past for ?ulcer.  - Hgb 5.4 on admission  - Currently HD stable  - FOBT positive  - s/p 1u pRBCs  - will give 2 more units pRBCs with caution for fluid overload, as patient already has bibasilar rales on exam.   - GI consult placed  - NPO for now  - received pantoprazole 40mg IVP in the ED. Will give another 40mg IVP now   - Active T&S  - 2 large bore IVs  - CBC q8h Presented following outpatient hgb 4.8 (baseline hgb 8-9). Patient denies any overt GI bleeding. Has been on Xarelto for DVT prophylaxis since recent hip surgery. Reports hx of endoscopies with cauterization in the past for ?ulcer. Likely GI source of bleeding. Lower suspicion for RP bleed or surgical site bleeding  - Hgb 5.4 on admission  - Currently HD stable  - FOBT positive  - s/p 1u pRBCs  - will give 2 more units pRBCs with caution for fluid overload, as patient already has bibasilar rales on exam.   - GI consult placed  - NPO for now  - received pantoprazole 40mg IVP in the ED. Will give another 40mg IVP now   - active T&S  - 2 large bore IVs  - CBC q8h

## 2019-08-10 NOTE — ED ADULT NURSE NOTE - OBJECTIVE STATEMENT
89 yo f pmhx of stomach bleeding, anemia, HTN triple bypass, CHF, sx in june of the hip, presents to the ED with from home with c/o low hemoglobin 4.9 as per MD. Pt states she went to the MD to get regular blood work done and was called later on because of the abnormal hemoglobin blood work which was 4.9. Pt reports she's been feeling weaker than usual. Pt presents to the ED with a soft collar in place, A&Ox2 to person and place, noted to have a dry wound  on the right cheek, abd soft and non distended. PT denies chest pain, blood in stool, recent trauma, fall, n/v/d, hemoptysis.

## 2019-08-10 NOTE — H&P ADULT - PROBLEM SELECTOR PLAN 3
Last TTE 06/2019 showed EF 60% with stage I diastolic dysfunction  - On Lasix 40mg PO daily at home  - Will give Lasix 40mg IVP after first unit of pRBCs, as patient has bibasilar rales and significant LE edema. Last TTE 06/2019 showed EF 60% with stage I diastolic dysfunction  - On Lasix 40mg PO daily at home  - Will give Lasix 40mg IVP after first unit of pRBCs, as patient has bibasilar rales and significant LE edema.   - Reassess patient after 2nd unit pRBCs for further diuresis  - Strict I/Os  - Daily weight

## 2019-08-10 NOTE — CONSULT NOTE ADULT - ASSESSMENT
Impression:    # Anemia with drop in Hgb: Currently with no overt GI bleeding, brown stool on rectal exam, HD stable, and with Hgb of 5.4 down from 7.5 to 9 in 6/2019. May represent bleeding from peptic ulcer disease due to H. pylori versus NSAID use, angioectasias, esophagitis, erosive gastritis, and gastric or duodenal malignancy. BUN:Cr < 36. May also represent bleeding from colonic source including colonic angioectasias and colorectal malignancy. Differential includes internal bleeding into right hip/thigh.   # CAD s/p CABG: Not on anti-platelets  # HFpEF  # DVT on Xarelto (stopped on 8/8/19)  # Asthma    Recommendations:  - The patient prefers conservative management and would like to avoid procedures if possible  - Can perform urgent endoscopy if patient clinically decompensates  - Would check CT A/P w/ IV contrast to rule out internal bleeding / hematoma  - Clear liquid diet  - Monitor CBCs q8h  - Monitor bowel movements  - Transfuse for goal Hgb >/= 7.0  - PPI IV BID  - Two large bore IVs  - Maintain active type and screen  - Rest of care per primary team    Please call GI ([phone]) if there are any additional questions or concerns. Please call on-call GI fellow after 5pm and before 8am, and on weekends.

## 2019-08-10 NOTE — H&P ADULT - ATTENDING COMMENTS
Seen, examined the patient with house staff  This is a 89 F with h/o recent R hip fracture s/p IMN on 6/25/19, CAD s/p CABGx3 >20 years ago (not on aspirin due to hx of GIB), HFpEF, HTN, mild intermittent asthma, presents from home after being found to have hgb 4.8 on outpatient labs by her PCP Dr. Lou on 8/8. Patient was placed on Xarelto for DVT prophylaxis post procedure. She denies noticing blood ib stool or black stool. There is no more swelling of right thigh or ecchymosis at the surgery site. She c/o fatigue, mild SOB but no fever, chest pain. Found to have positive stool occult test in ED, vitals are stable. She is admitted for acute blood loss anemia, suspected GI source.  - transfuse PRBC 2 units, 2nd unit in splits, IV lasix 40mg in between transfusions    off Xarelto, CBC q 8 hrs, PPI bid  - House GI consult called    Might have to r/o RP bleed or surgical site bleed with CT abd/pelvis if no GI procedure  - PT eval. PAS LE

## 2019-08-10 NOTE — H&P ADULT - NSHPPHYSICALEXAM_GEN_ALL_CORE
Vital Signs (24 Hrs):  T(C): 36.5 (08-10-19 @ 13:10), Max: 36.7 (08-10-19 @ 12:29)  HR: 78 (08-10-19 @ 13:10) (78 - 87)  BP: 131/53 (08-10-19 @ 13:10) (125/52 - 148/64)  RR: 16 (08-10-19 @ 13:10) (16 - 18)  SpO2: 100% (08-10-19 @ 13:10) (96% - 100%)  Wt(kg): --  Daily Height in cm: 152.4 (10 Aug 2019 10:11)    Daily       PHYSICAL EXAM:  GENERAL: NAD  HEAD:  Atraumatic, Normocephalic  EYES: EOMI, PERRLA, conjunctiva and sclera clear  ENMT: No tonsillar erythema, exudates, or enlargement; Moist mucous membranes, Good dentition, No lesions  NECK: neck brace in place  CHEST/LUNG: bibasilar rales, No wheezing  HEART: Regular rate and rhythm; No murmurs, rubs, or gallops  ABDOMEN: Soft, Nontender, Nondistended; Bowel sounds present  EXTREMITIES:  2+ pitting edema to knees b/l  LYMPH: No lymphadenopathy noted  SKIN: +R facial cheek lesion, RLE abrasion on shin   NERVOUS SYSTEM:  Alert & Oriented X4, GARDNER, no focal deficits

## 2019-08-10 NOTE — CONSULT NOTE ADULT - ATTENDING COMMENTS
The patient was seen and evaluated at bedside and the chart was reviewed. The case was discussed with the gastroenterology fellow. I agree with the above note, with the following additions/exceptions as detailed below.    This is a 89 year old woman with CAD s/p CABG, HFpEF, asthma, DVT on rivaroxaban (discontinued 8/8/19) and right hip fracture on 6/25/19 who presents with anemia without overt GI bleeding requiring PRBC transfusion x 2. She denies NSAID use and she believes she has had EGD/colonoscopy many years ago. There is a question as to whether she has a history of peptic ulcer disease. She has remained hemodynamically stable and her mental status is intact. Her rectal exam shows brown stool and she has had no further bowel movements while hospitalized. CT abdomen/pelvis reveals no retroperitoneal hemorrhage. The patient prefers to avoid endoscopic evaluation if possible.     Plan:  - Clear liquid diet today  - PPI twice daily  - Clarify goals of care with patient/family  prior to any endoscopic evaluation

## 2019-08-11 ENCOUNTER — TRANSCRIPTION ENCOUNTER (OUTPATIENT)
Age: 84
End: 2019-08-11

## 2019-08-11 LAB
ALBUMIN SERPL ELPH-MCNC: 3.1 G/DL — LOW (ref 3.3–5)
ALP SERPL-CCNC: 110 U/L — SIGNIFICANT CHANGE UP (ref 40–120)
ALT FLD-CCNC: 10 U/L — SIGNIFICANT CHANGE UP (ref 10–45)
ANION GAP SERPL CALC-SCNC: 11 MMOL/L — SIGNIFICANT CHANGE UP (ref 5–17)
APTT BLD: 24 SEC — LOW (ref 27.5–36.3)
AST SERPL-CCNC: 12 U/L — SIGNIFICANT CHANGE UP (ref 10–40)
BILIRUB SERPL-MCNC: 0.7 MG/DL — SIGNIFICANT CHANGE UP (ref 0.2–1.2)
BUN SERPL-MCNC: 22 MG/DL — SIGNIFICANT CHANGE UP (ref 7–23)
CALCIUM SERPL-MCNC: 8.1 MG/DL — LOW (ref 8.4–10.5)
CHLORIDE SERPL-SCNC: 103 MMOL/L — SIGNIFICANT CHANGE UP (ref 96–108)
CO2 SERPL-SCNC: 26 MMOL/L — SIGNIFICANT CHANGE UP (ref 22–31)
CREAT SERPL-MCNC: 0.85 MG/DL — SIGNIFICANT CHANGE UP (ref 0.5–1.3)
GLUCOSE SERPL-MCNC: 94 MG/DL — SIGNIFICANT CHANGE UP (ref 70–99)
HCT VFR BLD CALC: 28.1 % — LOW (ref 34.5–45)
HCT VFR BLD CALC: 28.1 % — LOW (ref 34.5–45)
HCT VFR BLD CALC: 29.7 % — LOW (ref 34.5–45)
HCT VFR BLD CALC: 30 % — LOW (ref 34.5–45)
HGB BLD-MCNC: 8.8 G/DL — LOW (ref 11.5–15.5)
HGB BLD-MCNC: 8.8 G/DL — LOW (ref 11.5–15.5)
HGB BLD-MCNC: 9.3 G/DL — LOW (ref 11.5–15.5)
HGB BLD-MCNC: 9.6 G/DL — LOW (ref 11.5–15.5)
INR BLD: 1.02 RATIO — SIGNIFICANT CHANGE UP (ref 0.88–1.16)
MAGNESIUM SERPL-MCNC: 2.1 MG/DL — SIGNIFICANT CHANGE UP (ref 1.6–2.6)
MCHC RBC-ENTMCNC: 26 PG — LOW (ref 27–34)
MCHC RBC-ENTMCNC: 26.1 PG — LOW (ref 27–34)
MCHC RBC-ENTMCNC: 26.3 PG — LOW (ref 27–34)
MCHC RBC-ENTMCNC: 26.9 PG — LOW (ref 27–34)
MCHC RBC-ENTMCNC: 31.3 GM/DL — LOW (ref 32–36)
MCHC RBC-ENTMCNC: 31.3 GM/DL — LOW (ref 32–36)
MCHC RBC-ENTMCNC: 31.5 GM/DL — LOW (ref 32–36)
MCHC RBC-ENTMCNC: 32.1 GM/DL — SIGNIFICANT CHANGE UP (ref 32–36)
MCV RBC AUTO: 83.1 FL — SIGNIFICANT CHANGE UP (ref 80–100)
MCV RBC AUTO: 83.4 FL — SIGNIFICANT CHANGE UP (ref 80–100)
MCV RBC AUTO: 83.5 FL — SIGNIFICANT CHANGE UP (ref 80–100)
MCV RBC AUTO: 83.8 FL — SIGNIFICANT CHANGE UP (ref 80–100)
PHOSPHATE SERPL-MCNC: 3.8 MG/DL — SIGNIFICANT CHANGE UP (ref 2.5–4.5)
PLATELET # BLD AUTO: 291 K/UL — SIGNIFICANT CHANGE UP (ref 150–400)
PLATELET # BLD AUTO: 307 K/UL — SIGNIFICANT CHANGE UP (ref 150–400)
PLATELET # BLD AUTO: 318 K/UL — SIGNIFICANT CHANGE UP (ref 150–400)
PLATELET # BLD AUTO: 320 K/UL — SIGNIFICANT CHANGE UP (ref 150–400)
POTASSIUM SERPL-MCNC: 3.6 MMOL/L — SIGNIFICANT CHANGE UP (ref 3.5–5.3)
POTASSIUM SERPL-SCNC: 3.6 MMOL/L — SIGNIFICANT CHANGE UP (ref 3.5–5.3)
PROT SERPL-MCNC: 5.3 G/DL — LOW (ref 6–8.3)
PROTHROM AB SERPL-ACNC: 11.7 SEC — SIGNIFICANT CHANGE UP (ref 10–12.9)
RBC # BLD: 3.37 M/UL — LOW (ref 3.8–5.2)
RBC # BLD: 3.38 M/UL — LOW (ref 3.8–5.2)
RBC # BLD: 3.55 M/UL — LOW (ref 3.8–5.2)
RBC # BLD: 3.58 M/UL — LOW (ref 3.8–5.2)
RBC # FLD: 14.7 % — HIGH (ref 10.3–14.5)
RBC # FLD: 14.8 % — HIGH (ref 10.3–14.5)
RBC # FLD: 14.9 % — HIGH (ref 10.3–14.5)
RBC # FLD: 15 % — HIGH (ref 10.3–14.5)
SODIUM SERPL-SCNC: 140 MMOL/L — SIGNIFICANT CHANGE UP (ref 135–145)
WBC # BLD: 6.9 K/UL — SIGNIFICANT CHANGE UP (ref 3.8–10.5)
WBC # BLD: 7.6 K/UL — SIGNIFICANT CHANGE UP (ref 3.8–10.5)
WBC # BLD: 8 K/UL — SIGNIFICANT CHANGE UP (ref 3.8–10.5)
WBC # BLD: 8.4 K/UL — SIGNIFICANT CHANGE UP (ref 3.8–10.5)
WBC # FLD AUTO: 6.9 K/UL — SIGNIFICANT CHANGE UP (ref 3.8–10.5)
WBC # FLD AUTO: 7.6 K/UL — SIGNIFICANT CHANGE UP (ref 3.8–10.5)
WBC # FLD AUTO: 8 K/UL — SIGNIFICANT CHANGE UP (ref 3.8–10.5)
WBC # FLD AUTO: 8.4 K/UL — SIGNIFICANT CHANGE UP (ref 3.8–10.5)

## 2019-08-11 PROCEDURE — 99233 SBSQ HOSP IP/OBS HIGH 50: CPT | Mod: GC

## 2019-08-11 RX ORDER — ONDANSETRON 8 MG/1
4 TABLET, FILM COATED ORAL ONCE
Refills: 0 | Status: DISCONTINUED | OUTPATIENT
Start: 2019-08-11 | End: 2019-08-11

## 2019-08-11 RX ADMIN — Medication 0.25 MILLIGRAM(S): at 22:59

## 2019-08-11 RX ADMIN — Medication 325 MILLIGRAM(S): at 12:29

## 2019-08-11 RX ADMIN — PANTOPRAZOLE SODIUM 40 MILLIGRAM(S): 20 TABLET, DELAYED RELEASE ORAL at 06:38

## 2019-08-11 RX ADMIN — Medication 20 MILLIGRAM(S): at 00:00

## 2019-08-11 RX ADMIN — ATORVASTATIN CALCIUM 40 MILLIGRAM(S): 80 TABLET, FILM COATED ORAL at 21:34

## 2019-08-11 RX ADMIN — PANTOPRAZOLE SODIUM 40 MILLIGRAM(S): 20 TABLET, DELAYED RELEASE ORAL at 17:45

## 2019-08-11 NOTE — DISCHARGE NOTE PROVIDER - NSDCCPCAREPLAN_GEN_ALL_CORE_FT
PRINCIPAL DISCHARGE DIAGNOSIS  Diagnosis: Anemia  Assessment and Plan of Treatment: You presented with a hemoglobin of ___. There was concern of a possible GI bleed. You got 2 units of blood, and you had frequent blood draws to ensure that your hemoglobin levels were stable.      SECONDARY DISCHARGE DIAGNOSES  Diagnosis: GI bleed  Assessment and Plan of Treatment: PRINCIPAL DISCHARGE DIAGNOSIS  Diagnosis: Anemia  Assessment and Plan of Treatment: You presented with a hemoglobin of 5.4. There was concern of a possible GI bleed. You got 2 units of blood, and you had frequent blood draws to ensure that your hemoglobin levels were stable. You had a CT of your abdomen and pelvis which showed no internal bleed. Please follow up with Dr. Lou on August 16th at 3:30 to ensure that your blood count is stable. If you feel weak, dizzy, have a fever/chills, or any new concerning symptom, please return to the emergency room.      SECONDARY DISCHARGE DIAGNOSES  Diagnosis: GI bleed  Assessment and Plan of Treatment:

## 2019-08-11 NOTE — PROGRESS NOTE ADULT - SUBJECTIVE AND OBJECTIVE BOX
Patient is a 89y old  Female who presents with a chief complaint of Anemia (10 Aug 2019 17:26)      INTERVAL HPI/OVERNIGHT EVENTS:  Pt got 2U of pRBC yesterday, found to be volume overloaded after. She received IV lasix 20mg overnight. Also seen by GI yesterday, pt wants to avoid procedures if possible - will get urgent endoscopy only if clinically decompensates. CTAP done to r/o bleed showed no internal bleed. This morning        MEDICATIONS  (STANDING):  atorvastatin 40 milliGRAM(s) Oral at bedtime  ferrous    sulfate 325 milliGRAM(s) Oral daily  pantoprazole  Injectable 40 milliGRAM(s) IV Push every 12 hours    MEDICATIONS  (PRN):  ALBUTerol/ipratropium for Nebulization 3 milliLiter(s) Nebulizer every 6 hours PRN Shortness of Breath and/or Wheezing      Vital Signs Last 24 Hrs  T(C): 36.6 (11 Aug 2019 04:29), Max: 36.8 (10 Aug 2019 19:30)  T(F): 97.9 (11 Aug 2019 04:29), Max: 98.2 (10 Aug 2019 19:30)  HR: 71 (11 Aug 2019 04:29) (69 - 87)  BP: 130/65 (11 Aug 2019 04:29) (125/52 - 165/83)  RR: 18 (11 Aug 2019 04:29) (16 - 18)  SpO2: 95% (11 Aug 2019 04:29) (95% - 100%)    PHYSICAL EXAM:  GENERAL: NAD.  CHEST/LUNG: Clear to auscultation; No rales, rhonchi, or wheezing.  Respiratory effort does not appear labored.  HEART: Regular rate and rhythm; S1 and S2,  no murmurs, rubs, or gallops.  ABDOMEN: Soft, not tender to palpation.  No masses or HSM appreciated.  No distension.  Bowel sounds present.  EXTREMITIES:  No clubbing, cyanosis, or edema.  Moves all extremities with strength 5/5.  SKIN: No obvious rashes or lesions.  Turgor okay.  NEURO:  Alert and oriented x 3, no focal sensory or  motor deficit, DTR 2+ bilaterally.    LABS:                        8.8    7.6   )-----------( 318      ( 11 Aug 2019 04:30 )             28.1     H/H: 5.4/ -> 8.8 (post-transfusion)    08-11    140  |  103  |  22  ----------------------------<  94  3.6   |  26  |  0.85    Ca    8.1<L>      11 Aug 2019 04:30  Phos  3.8     08-11  Mg     2.1     08-11    TPro  5.3<L>  /  Alb  3.1<L>  /  TBili  0.7  /  DBili  x   /  AST  12  /  ALT  10  /  AlkPhos  110  08-11    PT/INR - ( 11 Aug 2019 04:30 )   PT: 11.7 sec;   INR: 1.02 ratio         PTT - ( 11 Aug 2019 04:30 )  PTT:24.0 sec    CAPILLARY BLOOD GLUCOSE Patient is a 89y old  Female who presents with a chief complaint of Anemia (10 Aug 2019 17:26)      INTERVAL HPI/OVERNIGHT EVENTS:  Pt got 2U of pRBC yesterday, found to be volume overloaded after. She received IV lasix 20mg overnight. Also seen by GI yesterday, pt wants to avoid procedures if possible - will get urgent endoscopy only if clinically decompensates. CTAP done to r/o bleed showed no internal bleed. This morning pt reports feeling stronger than yesterday. States she felt like she was going to fall down yesterday, but doesn't feel that way today. Denies abdominal pain, chest pain, SOB. Last BM was         MEDICATIONS  (STANDING):  atorvastatin 40 milliGRAM(s) Oral at bedtime  ferrous    sulfate 325 milliGRAM(s) Oral daily  pantoprazole  Injectable 40 milliGRAM(s) IV Push every 12 hours    MEDICATIONS  (PRN):  ALBUTerol/ipratropium for Nebulization 3 milliLiter(s) Nebulizer every 6 hours PRN Shortness of Breath and/or Wheezing      Vital Signs Last 24 Hrs  T(C): 36.6 (11 Aug 2019 04:29), Max: 36.8 (10 Aug 2019 19:30)  T(F): 97.9 (11 Aug 2019 04:29), Max: 98.2 (10 Aug 2019 19:30)  HR: 71 (11 Aug 2019 04:29) (69 - 87)  BP: 130/65 (11 Aug 2019 04:29) (125/52 - 165/83)  RR: 18 (11 Aug 2019 04:29) (16 - 18)  SpO2: 95% (11 Aug 2019 04:29) (95% - 100%)    PHYSICAL EXAM:  GENERAL: NAD.  CHEST/LUNG: Clear to auscultation; No rales, rhonchi, or wheezing.  Respiratory effort does not appear labored.  HEART: Regular rate and rhythm; S1 and S2,  no murmurs, rubs, or gallops.  ABDOMEN: Soft, not tender to palpation.  No masses or HSM appreciated.  No distension.  Bowel sounds present.  EXTREMITIES:  No clubbing, cyanosis, or edema.  Moves all extremities with strength 5/5.  SKIN: No obvious rashes or lesions.  Turgor okay.  NEURO:  Alert and oriented x 3, no focal sensory or  motor deficit, DTR 2+ bilaterally.    LABS:                        8.8    7.6   )-----------( 318      ( 11 Aug 2019 04:30 )             28.1     H/H: 5.4/ -> 8.8 (post-transfusion)    08-11    140  |  103  |  22  ----------------------------<  94  3.6   |  26  |  0.85    Ca    8.1<L>      11 Aug 2019 04:30  Phos  3.8     08-11  Mg     2.1     08-11    TPro  5.3<L>  /  Alb  3.1<L>  /  TBili  0.7  /  DBili  x   /  AST  12  /  ALT  10  /  AlkPhos  110  08-11    PT/INR - ( 11 Aug 2019 04:30 )   PT: 11.7 sec;   INR: 1.02 ratio         PTT - ( 11 Aug 2019 04:30 )  PTT:24.0 sec    CAPILLARY BLOOD GLUCOSE Patient is a 89y old  Female who presents with a chief complaint of Anemia (10 Aug 2019 17:26)      INTERVAL HPI/OVERNIGHT EVENTS:  Pt got 2U of pRBC yesterday, found to be volume overloaded after. She received IV lasix 20mg overnight. Also seen by GI yesterday, pt wants to avoid procedures if possible - will get urgent endoscopy only if clinically decompensates. CTAP done to r/o bleed showed no internal bleed. This morning pt reports feeling stronger than yesterday. States she felt like she was going to fall down yesterday, but doesn't feel that way today. Denies abdominal pain, chest pain, SOB. Last BM was 3 days ago.         MEDICATIONS  (STANDING):  atorvastatin 40 milliGRAM(s) Oral at bedtime  ferrous    sulfate 325 milliGRAM(s) Oral daily  pantoprazole  Injectable 40 milliGRAM(s) IV Push every 12 hours    MEDICATIONS  (PRN):  ALBUTerol/ipratropium for Nebulization 3 milliLiter(s) Nebulizer every 6 hours PRN Shortness of Breath and/or Wheezing      Vital Signs Last 24 Hrs  T(C): 36.6 (11 Aug 2019 04:29), Max: 36.8 (10 Aug 2019 19:30)  T(F): 97.9 (11 Aug 2019 04:29), Max: 98.2 (10 Aug 2019 19:30)  HR: 71 (11 Aug 2019 04:29) (69 - 87)  BP: 130/65 (11 Aug 2019 04:29) (125/52 - 165/83)  RR: 18 (11 Aug 2019 04:29) (16 - 18)  SpO2: 95% (11 Aug 2019 04:29) (95% - 100%)    PHYSICAL EXAM:  GENERAL: NAD. Comfortable appearing  HEENT: Atraumatic, NC. No scleral icterus. Wearing a soft neck collar.   CHEST/LUNG: crackles to mid lung bilaterally.  HEART: Regular rate and rhythm; S1 and S2,  no murmurs, rubs, or gallops.  ABDOMEN: Soft, not tender to palpation.  No masses or HSM appreciated.  No distension.  Bowel sounds present.  EXTREMITIES:  1+ pitting edema to shin bilaterally  SKIN: No obvious rashes or lesions.  Turgor okay.      LABS:                        8.8    7.6   )-----------( 318      ( 11 Aug 2019 04:30 )             28.1     H: 5.4 -> 8.8 -> 9.3     08-11    140  |  103  |  22  ----------------------------<  94  3.6   |  26  |  0.85    Ca    8.1<L>      11 Aug 2019 04:30  Phos  3.8     08-11  Mg     2.1     08-11    TPro  5.3<L>  /  Alb  3.1<L>  /  TBili  0.7  /  DBili  x   /  AST  12  /  ALT  10  /  AlkPhos  110  08-11    PT/INR - ( 11 Aug 2019 04:30 )   PT: 11.7 sec;   INR: 1.02 ratio         PTT - ( 11 Aug 2019 04:30 )  PTT:24.0 sec    CAPILLARY BLOOD GLUCOSE Patient is a 89y old  Female who presents with a chief complaint of Anemia (10 Aug 2019 17:26)      INTERVAL HPI/OVERNIGHT EVENTS:  Pt got 2U of pRBC yesterday, found to be volume overloaded after. She received IV lasix 20mg overnight. Also seen by GI yesterday, pt wants to avoid procedures if possible - will get urgent endoscopy only if clinically decompensates. CTAP done to r/o bleed showed no internal bleed. This morning pt reports feeling stronger than yesterday. States she felt like she was going to fall down yesterday, but doesn't feel that way today. Denies abdominal pain, chest pain, SOB. Last BM was 3 days ago.       MEDICATIONS  (STANDING):  atorvastatin 40 milliGRAM(s) Oral at bedtime  ferrous    sulfate 325 milliGRAM(s) Oral daily  pantoprazole  Injectable 40 milliGRAM(s) IV Push every 12 hours    MEDICATIONS  (PRN):  ALBUTerol/ipratropium for Nebulization 3 milliLiter(s) Nebulizer every 6 hours PRN Shortness of Breath and/or Wheezing      Vital Signs Last 24 Hrs  T(C): 36.6 (11 Aug 2019 04:29), Max: 36.8 (10 Aug 2019 19:30)  T(F): 97.9 (11 Aug 2019 04:29), Max: 98.2 (10 Aug 2019 19:30)  HR: 71 (11 Aug 2019 04:29) (69 - 87)  BP: 130/65 (11 Aug 2019 04:29) (125/52 - 165/83)  RR: 18 (11 Aug 2019 04:29) (16 - 18)  SpO2: 95% (11 Aug 2019 04:29) (95% - 100%)    I&O's Summary    10 Aug 2019 07:01  -  11 Aug 2019 07:00  --------------------------------------------------------  IN: 360 mL / OUT: 1800 mL / NET: -1440 mL        PHYSICAL EXAM:  GENERAL: NAD. Comfortable appearing  HEENT: Atraumatic, NC. No scleral icterus. Wearing a soft neck collar.   CHEST/LUNG: crackles to mid lung bilaterally.  HEART: Regular rate and rhythm; S1 and S2,  no murmurs, rubs, or gallops.  ABDOMEN: Soft, not tender to palpation.  No masses or HSM appreciated.  No distension.  Bowel sounds present.  EXTREMITIES:  1+ pitting edema to shin bilaterally  SKIN: No obvious rashes or lesions.  Turgor okay.      LABS:                        8.8    7.6   )-----------( 318      ( 11 Aug 2019 04:30 )             28.1     H: 5.4 -> 8.8 -> 9.3     08-11    140  |  103  |  22  ----------------------------<  94  3.6   |  26  |  0.85    Ca    8.1<L>      11 Aug 2019 04:30  Phos  3.8     08-11  Mg     2.1     08-11    TPro  5.3<L>  /  Alb  3.1<L>  /  TBili  0.7  /  DBili  x   /  AST  12  /  ALT  10  /  AlkPhos  110  08-11    PT/INR - ( 11 Aug 2019 04:30 )   PT: 11.7 sec;   INR: 1.02 ratio         PTT - ( 11 Aug 2019 04:30 )  PTT:24.0 sec

## 2019-08-11 NOTE — PHYSICAL THERAPY INITIAL EVALUATION ADULT - PERTINENT HX OF CURRENT PROBLEM, REHAB EVAL
89 y/o F PMHx R hip fracture s/p IMN on 6/25/19, CAD s/p CABGx3 >20 years ago (not on aspirin due to hx of GIB), HFpEF, HTN, mild intermittent asthma, presents from home after being found to have hgb 4.8 on outpatient labs. Found GI bleed, CT ABD (-).

## 2019-08-11 NOTE — PHYSICAL THERAPY INITIAL EVALUATION ADULT - DISCHARGE DISPOSITION, PT EVAL
rehabilitation facility/Subacute Rehab. however, pt refused and preferred home, if home, Home with Home PT for strengthening, bed mob, transfer, gait and balance training, home with assist for all functional mobility, amb with RW

## 2019-08-11 NOTE — PROGRESS NOTE ADULT - PROBLEM SELECTOR PLAN 7
DVT Prophylaxis: SCDs in setting of GI bleed  Diet: NPO  Dispo: Pending PT  GOC: DNR/DNI DVT Prophylaxis: SCDs in setting of GI bleed  Diet: soft diet  Dispo: Pending PT and stable CBC  GOC: DNR/DNI

## 2019-08-11 NOTE — DISCHARGE NOTE PROVIDER - NSDCFUSCHEDAPPT_GEN_ALL_CORE_FT
MELLO DOMINGO ; 08/16/2019 ; [facility] [address] [place] [address] MELLO DOMINGO ; 08/16/2019 ; [facility] [address] [place] [address]

## 2019-08-11 NOTE — PHYSICAL THERAPY INITIAL EVALUATION ADULT - ADDITIONAL COMMENTS
as per pt, resides in a PH alone, 3 stairs to enter with railings, PTA, pt amb (I) with RW, distance limited to 1 block d/t decr endurance, required assist for ADls, +HHA 4H/7D

## 2019-08-11 NOTE — PROGRESS NOTE ADULT - PROBLEM SELECTOR PLAN 1
Hgb 4.8 on outpatient lab (baseline hgb 8-9). Patient denies any overt GI bleeding. Has been on Xarelto for DVT prophylaxis since recent hip surgery. Reports hx of endoscopies with cauterization in the past for ?ulcer. Anemia is likely GI source of bleeding, however CTAP without signs of internal bleeding and pt does not want EGD at this moment. Lower suspicion for RP bleed or surgical site bleeding  - Hgb 5.4 on admission -> 8.8 post-transfusion  - s/p 2u pRBCs  - pantoprazole 40mg IV BID  - active T&S  - 2 large bore IVs  - CBC q8h Hgb 4.8 on outpatient lab (baseline hgb 8-9). Patient denies any overt GI bleeding. Has been on Xarelto for DVT prophylaxis since recent hip surgery. Reports hx of endoscopies with cauterization in the past for ?ulcer. Anemia is likely GI source of bleeding, however CTAP without signs of internal bleeding and pt does not want EGD at this moment. Lower suspicion for RP bleed or surgical site bleeding  - Hgb 5.4 on admission -> 9.3 post-transfusion  - s/p 2u pRBCs  - pantoprazole 40mg IV BID  - active T&S  - 2 large bore IVs  - CBC q8h Hgb 4.8 on outpatient lab (baseline hgb 8-9). Patient denies any overt GI bleeding. Has been on Xarelto for DVT prophylaxis since recent hip surgery. Reports hx of endoscopies with cauterization in the past for ?ulcer. Anemia is possibly from upper GI bleed given history of ulcer. However, CTAP without signs of internal bleeding and pt does not want EGD at this moment. Lower suspicion for RP bleed or surgical site bleeding  - Hgb 5.4 on admission -> 9.3  - s/p 2u pRBCs  - pantoprazole 40mg IV BID  - active T&S  - 2 large bore IVs  - CBC q8h Hgb 4.8 on outpatient lab (baseline hgb 8-9). Anemia is possibly from upper GI bleed given history of ulcer and recent xarelto use. However, CTAP without signs of internal bleeding and pt does not want EGD at this moment. Lower suspicion for RP bleed or surgical site bleeding  - Hgb 5.4 on admission -> 9.3  - s/p 2u pRBCs  - pantoprazole 40mg IV BID  - active T&S  - 2 large bore IVs  - CBC q8h - will reassess tomorrow to decrease frequency of blood draw

## 2019-08-11 NOTE — DISCHARGE NOTE PROVIDER - HOSPITAL COURSE
89 y/o F PMHx R hip fracture s/p IMN on 6/25/19 (on Xarelto for post-surgery dvt ppx), CAD s/p CABGx3 >20 years ago (not on aspirin due to hx of GIB), HFpEF, HTN, mild intermittent asthma, presents from home after being found to have hgb 4.8 on outpatient labs. In ED vital signs were stable. Initial workup notable for H&H 5.4/17.4. Pt was given 1U pRBC and started on pantoprazole 40mg IV in the ED. Pt was admitted to medicine floor to r/o GI bleed. On medicine floor, pt was given another unit of pRBC. Post-transfusion CBC showed H&H 8.8/28.1. Pt was found to be volume overloaded, given IV lasix 40mgx1 and 20mg x1. GI was consulted for concern of GI bleed - however, pt did not want endoscopy or any invasive procedure. Therefore conservative measures were taken only. Pt had CTAP which showed no internal bleed, and had q8h CBC, which showed stable H&H. 89 y/o F PMHx R hip fracture s/p IMN on 6/25/19 (on Xarelto for post-surgery dvt ppx), CAD s/p CABGx3 >20 years ago (not on aspirin due to hx of GIB), HFpEF, HTN, mild intermittent asthma, presents from home after being found to have hgb 4.8 on outpatient labs. In ED vital signs were stable. Initial workup notable for H&H 5.4/17.4. Pt was given 1U pRBC and started on pantoprazole 40mg IV in the ED. Pt was admitted to medicine floor to r/o GI bleed. On medicine floor, pt was given another unit of pRBC. Post-transfusion CBC showed H&H 8.8/28.1. Pt was found to be volume overloaded, given IV lasix 40mgx1 and 20mg x1. GI was consulted for concern of GI bleed - however, pt did not want endoscopy or any invasive procedure. Therefore conservative measures were taken only. Pt had CTAP which showed no internal bleed, and had q8h CBC, which showed stable H&H. Pt was delirious during hospital stay, likely from sundowning - required ativan 0.25mg x4 and haldol 2mgx1. Psych was consulted. 89 y/o F PMHx R hip fracture s/p IMN on 6/25/19 (on Xarelto for post-surgery dvt ppx), CAD s/p CABGx3 >20 years ago (not on aspirin due to hx of GIB), HFpEF, HTN, mild intermittent asthma, presents from home after being found to have hgb 4.8 on outpatient labs. In ED vital signs were stable. Initial workup notable for H&H 5.4/17.4 and positive fecal occult blood. Pt was given 1U pRBC and started on pantoprazole 40mg IV in the ED. Pt was admitted to medicine floor to r/o GI bleed. On medicine floor, pt was given another unit of pRBC. Post-transfusion CBC showed H&H 8.8/28.1. Pt was found to be volume overloaded, given IV lasix 40mgx1 and 20mg x1. GI was consulted for concern of GI bleed - however, pt did not want endoscopy or any invasive procedure. Therefore conservative measures were taken only. Pt had CTAP which showed no internal bleed, and had q8h CBC, which showed stable H&H. Pt was delirious during hospital stay, likely from sundowning - required ativan 0.25mg x4 and haldol 2mgx1. Psych was consulted. 89 y/o F PMHx R hip fracture s/p IMN on 6/25/19 (on Xarelto for post-surgery dvt ppx), CAD s/p CABGx3 >20 years ago (not on aspirin due to hx of GIB), HFpEF, HTN, mild intermittent asthma, presents from home after being found to have hgb 4.8 on outpatient labs. In ED vital signs were stable. Initial workup notable for H&H 5.4/17.4 and positive fecal occult blood. Pt was given 1U pRBC and started on pantoprazole 40mg IV in the ED. Pt was admitted to medicine floor to r/o GI bleed. On medicine floor, pt was given another unit of pRBC. Post-transfusion CBC showed H&H 8.8/28.1. Pt was found to be volume overloaded, given IV lasix 40mgx1 and 20mg x1. GI was consulted for concern of GI bleed - however, pt did not want endoscopy or any invasive procedure. Therefore conservative measures were taken only. Pt had CTAP which showed no internal bleed, and had q8h CBC, which showed stable H&H. Pt was delirious during hospital stay, likely from sundowning - required ativan 0.25mg x4 and haldol 2mgx1.

## 2019-08-11 NOTE — PHYSICAL THERAPY INITIAL EVALUATION ADULT - PLANNED THERAPY INTERVENTIONS, PT EVAL
stairs: GOAL: patient will be able to negotiated  4 stairs (I) with one HR in 2 weeks/gait training/transfer training/bed mobility training

## 2019-08-11 NOTE — PROGRESS NOTE ADULT - PROBLEM SELECTOR PLAN 2
UGI 2/2 ulcer vs lower GI bleed 2/2 diverticulosis vs AVM vs malignancy.  - GI consult placed  - Plan as above  - Patient/Daughter prefer for conservative management at this time, but will discuss with GI. UGI 2/2 ulcer vs lower GI bleed 2/2 diverticulosis vs AVM   - GI consult placed  - Plan as above  - Patient/Daughter prefer for conservative management at this time

## 2019-08-11 NOTE — PROGRESS NOTE ADULT - PROBLEM SELECTOR PLAN 5
s/p IMN in 06/2019   - Pain control with tylenol PRN  - Ambulate as tolerated with assistance  - Fall precautions s/p IMN in 06/2019   - Pain control with tylenol PRN  - Ambulate as tolerated with assistance  - Fall precautions  - No NSAIDs

## 2019-08-11 NOTE — PROGRESS NOTE ADULT - PROBLEM SELECTOR PLAN 3
Last TTE 06/2019 showed EF 60% with stage I diastolic dysfunction  - On Lasix 40mg PO daily at home  - Will give Lasix 40mg IVP after first unit of pRBCs, as patient has bibasilar rales and significant LE edema.   - Reassess patient after 2nd unit pRBCs for further diuresis  - Strict I/Os  - Daily weight Last TTE 06/2019 showed EF 60% with stage I diastolic dysfunction  - On Lasix 40mg PO daily at home  - s/p IV lasix 40mgx1, 20mgx1    - Reassess patient after 2nd unit pRBCs for further diuresis  - Strict I/Os  - Daily weight Last TTE 06/2019 showed EF 60% with stage I diastolic dysfunction  - On Lasix 40mg PO daily at home  - s/p IV lasix 40mgx1, 20mgx1    - Strict I/Os  - Daily weight

## 2019-08-11 NOTE — DISCHARGE NOTE PROVIDER - CARE PROVIDER_API CALL
Tsering Lou)  Internal Medicine  [address], [facility] [address]  [place] [address]  Phone: [phone]  Fax: [phone]  Follow Up Time: 1 week

## 2019-08-12 ENCOUNTER — MOBILE ON CALL (OUTPATIENT)
Age: 84
End: 2019-08-12

## 2019-08-12 DIAGNOSIS — R41.0 DISORIENTATION, UNSPECIFIED: ICD-10-CM

## 2019-08-12 DIAGNOSIS — F05 DELIRIUM DUE TO KNOWN PHYSIOLOGICAL CONDITION: ICD-10-CM

## 2019-08-12 LAB
HCT VFR BLD CALC: 30.5 % — LOW (ref 34.5–45)
HGB BLD-MCNC: 9.4 G/DL — LOW (ref 11.5–15.5)
MCHC RBC-ENTMCNC: 26.2 PG — LOW (ref 27–34)
MCHC RBC-ENTMCNC: 31 GM/DL — LOW (ref 32–36)
MCV RBC AUTO: 84.7 FL — SIGNIFICANT CHANGE UP (ref 80–100)
PLATELET # BLD AUTO: 307 K/UL — SIGNIFICANT CHANGE UP (ref 150–400)
RBC # BLD: 3.6 M/UL — LOW (ref 3.8–5.2)
RBC # FLD: 15.2 % — HIGH (ref 10.3–14.5)
WBC # BLD: 7.8 K/UL — SIGNIFICANT CHANGE UP (ref 3.8–10.5)
WBC # FLD AUTO: 7.8 K/UL — SIGNIFICANT CHANGE UP (ref 3.8–10.5)

## 2019-08-12 PROCEDURE — 99232 SBSQ HOSP IP/OBS MODERATE 35: CPT | Mod: GC

## 2019-08-12 PROCEDURE — 93010 ELECTROCARDIOGRAM REPORT: CPT

## 2019-08-12 PROCEDURE — 99222 1ST HOSP IP/OBS MODERATE 55: CPT | Mod: GC

## 2019-08-12 PROCEDURE — 99233 SBSQ HOSP IP/OBS HIGH 50: CPT

## 2019-08-12 RX ORDER — HALOPERIDOL DECANOATE 100 MG/ML
1 INJECTION INTRAMUSCULAR EVERY 6 HOURS
Refills: 0 | Status: DISCONTINUED | OUTPATIENT
Start: 2019-08-12 | End: 2019-08-13

## 2019-08-12 RX ORDER — HALOPERIDOL DECANOATE 100 MG/ML
2 INJECTION INTRAMUSCULAR ONCE
Refills: 0 | Status: COMPLETED | OUTPATIENT
Start: 2019-08-12 | End: 2019-08-12

## 2019-08-12 RX ORDER — TRAZODONE HCL 50 MG
50 TABLET ORAL ONCE
Refills: 0 | Status: COMPLETED | OUTPATIENT
Start: 2019-08-12 | End: 2019-08-12

## 2019-08-12 RX ORDER — LANOLIN ALCOHOL/MO/W.PET/CERES
3 CREAM (GRAM) TOPICAL AT BEDTIME
Refills: 0 | Status: DISCONTINUED | OUTPATIENT
Start: 2019-08-12 | End: 2019-08-13

## 2019-08-12 RX ADMIN — Medication 3 MILLIGRAM(S): at 22:52

## 2019-08-12 RX ADMIN — Medication 0.25 MILLIGRAM(S): at 10:49

## 2019-08-12 RX ADMIN — PANTOPRAZOLE SODIUM 40 MILLIGRAM(S): 20 TABLET, DELAYED RELEASE ORAL at 16:45

## 2019-08-12 RX ADMIN — PANTOPRAZOLE SODIUM 40 MILLIGRAM(S): 20 TABLET, DELAYED RELEASE ORAL at 12:08

## 2019-08-12 RX ADMIN — HALOPERIDOL DECANOATE 2 MILLIGRAM(S): 100 INJECTION INTRAMUSCULAR at 12:07

## 2019-08-12 RX ADMIN — ATORVASTATIN CALCIUM 40 MILLIGRAM(S): 80 TABLET, FILM COATED ORAL at 22:52

## 2019-08-12 RX ADMIN — Medication 0.25 MILLIGRAM(S): at 08:30

## 2019-08-12 RX ADMIN — HALOPERIDOL DECANOATE 1 MILLIGRAM(S): 100 INJECTION INTRAMUSCULAR at 16:37

## 2019-08-12 RX ADMIN — Medication 0.25 MILLIGRAM(S): at 06:58

## 2019-08-12 NOTE — BEHAVIORAL HEALTH ASSESSMENT NOTE - OTHER
pt in bed, unable to assess varies from normal to garbled depending on level of alertness pt not responsive to question unable to assess mood birda nursing

## 2019-08-12 NOTE — PROVIDER CONTACT NOTE (OTHER) - BACKGROUND
Patient admitted for anemia
Pt. admitted for anemia
Pt. admitted with anemia
Pt. admitted with dx of anemia
admitted for anemia
admitted for anemia

## 2019-08-12 NOTE — PROGRESS NOTE ADULT - PROBLEM SELECTOR PLAN 3
Last TTE 06/2019 showed EF 60% with stage I diastolic dysfunction  - On Lasix 40mg PO daily at home  - s/p IV lasix 40mgx1, 20mgx1    - Strict I/Os  - Daily weight UGI 2/2 ulcer vs lower GI bleed 2/2 diverticulosis vs AVM   - GI consult placed  - Plan as above  - Patient/Daughter prefer for conservative management at this time

## 2019-08-12 NOTE — BEHAVIORAL HEALTH ASSESSMENT NOTE - CASE SUMMARY
Patient is a 89 year old woman, lives alone with 24hr aides, no PPH, PMH of R hip fracture s/p IMN on 6/25/19, CAD s/p CABGx3 >20 years ago, HFpEF, HTN, mild intermittent asthma, presents with increasing fatigue and found to have hgb 4.8 on outpatient labs, now hgb of 9.4 after transfusions, FOBT+, patient and family do not want additional procedures.  Psychiatry consulted for agitation and delirium.  On my examination this morning, pt very lethargic s/p multiple PRNs given including Ativan and 2mg Haldol, unable to arouse despite vigorous tactile stim.  Family notes an acute departure from baseline mental status c/w acute delirium.  Dx: Delirium 2/2 GMC.  Recs: 1. Start Melatonin 3mg PO qhS. 2. PRN: Haldol 0.25mg - 2mg PO/IV q6h PRN severe agitation.  Monitor for QTc<500.  3. Check: TSH, B12. 4. Minimize use of benzos, opioids, anticholinergics, or other deliriogenic agents when possible.  Maintain sleep wake cycle.  Provide frequent reorientation and redirection.  Family member at bedside if possible. Assess for need for glasses and hearing aid (if applicable).  5. Pt does not meet criteria for psychiatric hospitalization.  Recommend outpt psych f/u at [facility] after d/c- [phone].   [facility] Psych will follow.

## 2019-08-12 NOTE — BEHAVIORAL HEALTH ASSESSMENT NOTE - HPI (INCLUDE ILLNESS QUALITY, SEVERITY, DURATION, TIMING, CONTEXT, MODIFYING FACTORS, ASSOCIATED SIGNS AND SYMPTOMS)
Patient is a 89 year old woman, lives alone with 24hr aides, no PPH, PMH of R hip fracture s/p IMN on 6/25/19, CAD s/p CABGx3 >20 years ago, HFpEF, HTN, mild intermittent asthma, presents with increasing fatigue and found to have hgb 4.8 on outpatient labs, now hgb of 9.4 after transfusions, FOBT+, patient and family do not want additional procedures.  Psychiatry consulted for agitation and delirium.    On evaluation, patient keeps eyes closed, is restless, agitated, attempts to get OOB, attempts to bite staff, curses at staff.  Ten minutes later, patient is soundly asleep, unable to be roused by verbal or tactile stimuli.  Patient received 0.25mg Ativan x4 and Haldol 2mg IV x1 prior to interview.  While agitated, patient also observed to yell "They're trying to kill me, call the police," and later calls and gestures to an unseen figure she says is a bird.  She refuses to state her name, Ox0 (baseline A&Ox3, does not know date).    Per nursing, patient was calm and pleasant yesterday afternoon, and became confused yesterday evening (8/11).  Of note, patient has not slept more than a few hours since she was admitted.  She has been increasingly agitated and confused, OOB and walking into other patients' rooms, spitting, cursing, biting, screaming, banging her walker on the floor.  She is refusing PO meds.    Per patient's daughter, her baseline is A&Ox3 (trouble with date), and she has never had behavior like this before.  She denies patient has a history of issues with memory or confusion, aside from occasional word finding difficulty.   She states patient has been through a lot in the past 4.5 months with her hip fx and rehab, and they have both decided to limit the number of procedures going forward. Patient is a 89 year old woman, lives alone with 24hr aides, no PPH, PMH of R hip fracture s/p IMN on 6/25/19, CAD s/p CABGx3 >20 years ago, HFpEF, HTN, mild intermittent asthma, presents with increasing fatigue and found to have hgb 4.8 on outpatient labs, now hgb of 9.4 after transfusions, FOBT+, patient and family do not want additional procedures.  Psychiatry consulted for agitation and delirium.    On evaluation, patient keeps eyes closed, is restless, agitated, attempts to get OOB, attempts to bite staff, curses at staff.  Ten minutes later, patient is soundly asleep, unable to be roused by verbal or tactile stimuli.  Patient received 0.25mg Ativan x4 and Haldol 2mg IV x1 prior to interview.  While agitated, patient also observed to yell "They're trying to kill me, call the police," and later calls and gestures to an unseen figure she says is a bird.  She refuses to state her name, Ox0 (baseline A&Ox3, does not know date).    Per nursing, patient was calm and pleasant yesterday afternoon, and became confused yesterday evening (8/11).  Of note, patient has not slept more than a few hours since she was admitted.  She has been increasingly agitated and confused, OOB and walking into other patients' rooms, spitting, cursing, biting, screaming, banging her walker on the floor.  She is refusing PO meds, also refuses labs.    Per patient's daughter, her baseline is A&Ox3 (trouble with date), and she has never had behavior like this before.  She denies patient has a history of issues with memory or confusion, aside from occasional word finding difficulty.   She states patient has been through a lot in the past 4.5 months with her hip fx and rehab, and they have both decided to limit the number of procedures going forward.    Followed up with team in the afternoon, patient again agitated and screaming despite 2mg Haldol in the AM. Patient is a 89 year old woman, lives alone with 24hr aides, no PPH, PMH of R hip fracture s/p IMN on 6/25/19, CAD s/p CABGx3 >20 years ago, HFpEF, HTN, mild intermittent asthma, presents with increasing fatigue and found to have hgb 4.8 on outpatient labs, now hgb of 9.4 after transfusions, FOBT+, Psychiatry consulted for agitation and delirium.    On evaluation, patient keeps eyes closed, is restless, agitated, attempts to get OOB, attempts to bite staff, curses at staff.  Ten minutes later, patient is soundly asleep, unable to be roused by verbal or tactile stimuli.  Patient received 0.25mg Ativan x4 and Haldol 2mg IV x1 prior to interview.  While agitated, patient also observed to yell "They're trying to kill me, call the police," and later calls and gestures to an unseen figure she says is a bird.  She refuses to state her name, Ox0 (baseline A&Ox3, does not know date).    Per nursing, patient was calm and pleasant yesterday afternoon, and became confused yesterday evening (8/11).  Of note, patient has not slept more than a few hours since she was admitted.  She has been increasingly agitated and confused, OOB and walking into other patients' rooms, spitting, cursing, biting, screaming, banging her walker on the floor.  She is refusing PO meds, also refuses labs.    Per patient's daughter, her baseline is A&Ox3 (trouble with date), and she has never had behavior like this before.  She denies patient has a history of issues with memory or confusion, aside from occasional word finding difficulty.   She states patient has been through a lot in the past 4.5 months with her hip fx and rehab, and they have both decided to limit the number of procedures going forward.    Followed up with team in the afternoon, patient again agitated and screaming despite 2mg Haldol in the AM.

## 2019-08-12 NOTE — PROVIDER CONTACT NOTE (OTHER) - RECOMMENDATIONS
Note refusal
D/C restraint  RN will not initiate restraint
D/C restraint  RN will not initiate restraint   patient requires medication to help alleviate agitation   psychiatry did okay haldol earlier   new EKG is provided.
hold blood draw for cbc until morning labs
med to calm pt. down

## 2019-08-12 NOTE — PROGRESS NOTE ADULT - PROBLEM SELECTOR PLAN 1
Hgb 4.8 on outpatient lab (baseline hgb 8-9). Anemia is possibly from upper GI bleed given history of ulcer and recent xarelto use. However, CTAP without signs of internal bleeding and pt does not want EGD at this moment. Lower suspicion for RP bleed or surgical site bleeding  - Hgb 5.4 on admission -> 9.3  - s/p 2u pRBCs  - pantoprazole 40mg IV BID  - active T&S  - 2 large bore IVs  - CBC q8h - will reassess tomorrow to decrease frequency of blood draw Hgb 4.8 on outpatient lab (baseline hgb 8-9). Anemia is possibly from upper GI bleed given history of ulcer and recent xarelto use. However, CTAP without signs of internal bleeding and pt does not want EGD. Lower suspicion for RP bleed or surgical site bleeding.  - Hgb 5.4 on admission -> 9.6  - s/p 2u pRBCs  - pantoprazole 40mg IV BID  - active T&S  - 2 large bore IVs Hgb 4.8 on outpatient lab (baseline hgb 8-9). Anemia is possibly from upper GI bleed given history of ulcer and recent xarelto use. However, CTAP without signs of internal bleeding and pt does not want EGD. Lower suspicion for RP bleed or surgical site bleeding. Hgb has been stable - pt is stable for discharge.   - Hgb 5.4 on admission -> 9.6 - Hgb stable  - s/p 2u pRBCs  - pantoprazole 40mg IV BID  - active T&S  - 2 large bore IVs Pt was delirious overnight and this morning, likely sundowning. Low concern for infectious etiology given no leukocytosis and afebrile; also low concern for stroke as pt was moving all extremities. Pt was not responsive to reorientation and daughter present. Still combative after ativan 0.25mg x4. Pt finally calmed down after getting haldol 2mg.  Psych was consulted  - psych consulted, recs appreciated  - s/p ativan 0.25mg x4, haldol 2mg x1

## 2019-08-12 NOTE — PROVIDER CONTACT NOTE (OTHER) - REASON
pt. very restless and confused, thinks they are home wants to get out of bed and not be touched going into other pts rooms

## 2019-08-12 NOTE — PROGRESS NOTE ADULT - SUBJECTIVE AND OBJECTIVE BOX
Patient is a 89y old  Female who presents with a chief complaint of anemia (11 Aug 2019 15:18)      INTERVAL HPI/OVERNIGHT EVENTS:  Overnight pt was agitated/delirious, got 0.25 ativan (last qtc 488) and a brief 1:1 obs for fall precaution. She also refused night labs.        Vital Signs Last 24 Hrs  T(C): 36.7 (11 Aug 2019 21:02), Max: 36.7 (11 Aug 2019 21:02)  T(F): 98 (11 Aug 2019 21:02), Max: 98 (11 Aug 2019 21:02)  HR: 85 (11 Aug 2019 21:02) (70 - 85)  BP: 151/71 (11 Aug 2019 21:02) (133/64 - 153/73)  RR: 18 (11 Aug 2019 21:02) (18 - 18)  SpO2: 96% (11 Aug 2019 21:02) (95% - 96%)      PHYSICAL EXAM:  GENERAL: NAD.  CHEST/LUNG: Clear to auscultation; No rales, rhonchi, or wheezing.  Respiratory effort does not appear labored.  HEART: Regular rate and rhythm; S1 and S2,  no murmurs, rubs, or gallops.  ABDOMEN: Soft, not tender to palpation.  No masses or HSM appreciated.  No distension.  Bowel sounds present.  EXTREMITIES:  No clubbing, cyanosis, or edema.  Moves all extremities with strength 5/5.  SKIN: No obvious rashes or lesions.  Turgor okay.  NEURO:  Alert and oriented x 3, no focal sensory or  motor deficit, DTR 2+ bilaterally.    LABS:                        9.6    8.4   )-----------( 320      ( 11 Aug 2019 17:35 )             30.0 Patient is a 89y old  Female who presents with a chief complaint of anemia (11 Aug 2019 15:18)      INTERVAL HPI/OVERNIGHT EVENTS:  Overnight pt was agitated/delirious, got 0.25 ativan (last qtc 488) and a brief 1:1 obs for fall precaution. She also refused night labs. This morning pt is still agitated and combative, walking the halls and into other pt's room. She got another ativan 0.25mg.         Vital Signs Last 24 Hrs  T(C): 36.7 (11 Aug 2019 21:02), Max: 36.7 (11 Aug 2019 21:02)  T(F): 98 (11 Aug 2019 21:02), Max: 98 (11 Aug 2019 21:02)  HR: 85 (11 Aug 2019 21:02) (70 - 85)  BP: 151/71 (11 Aug 2019 21:02) (133/64 - 153/73)  RR: 18 (11 Aug 2019 21:02) (18 - 18)  SpO2: 96% (11 Aug 2019 21:02) (95% - 96%)      PHYSICAL EXAM: Limited by delirium  GENERAL: Agitated, walking around, combative.   CHEST/LUNG: Respiratory effort does not appear labored.   EXTREMITIES:  No obvious clubbing, cyanosis, or edema.  Moves all extremities   SKIN: No obvious rashes or lesions.       LABS:                          9.6    8.4   )-----------( 320      ( 11 Aug 2019 17:35 )             30.0     Hgb: 5.4 -> 8.8 -> 8.8 -> 9.3 -> 9.6 Patient is a 89y old  Female who presents with a chief complaint of anemia (11 Aug 2019 15:18)      INTERVAL HPI/OVERNIGHT EVENTS:  Overnight pt was agitated/delirious. She also refused night labs. This morning pt is still agitated and combative, walking the halls and into other pt's room. She got a total of ativan 0.25mg x4 doses (QTC was 488). However, pt was still combative after multiple doses of ativan. Pt calmed down after Haldol 2mg, and psych was consulted.           Vital Signs Last 24 Hrs  T(C): 36.7 (11 Aug 2019 21:02), Max: 36.7 (11 Aug 2019 21:02)  T(F): 98 (11 Aug 2019 21:02), Max: 98 (11 Aug 2019 21:02)  HR: 85 (11 Aug 2019 21:02) (70 - 85)  BP: 151/71 (11 Aug 2019 21:02) (133/64 - 153/73)  RR: 18 (11 Aug 2019 21:02) (18 - 18)  SpO2: 96% (11 Aug 2019 21:02) (95% - 96%)      PHYSICAL EXAM: Limited by delirium  GENERAL: Agitated, walking around, combative, yelling.   CHEST/LUNG: Respiratory effort does not appear labored.   EXTREMITIES:  No obvious clubbing, cyanosis, or edema.  Moves all extremities   SKIN: No obvious rashes or lesions.       LABS:                          9.6    8.4   )-----------( 320      ( 11 Aug 2019 17:35 )             30.0     Hgb: 5.4 -> 8.8 -> 8.8 -> 9.3 -> 9.6 -> 9.4

## 2019-08-12 NOTE — BEHAVIORAL HEALTH ASSESSMENT NOTE - NSBHCONSULTMEDS_PSY_A_CORE FT
1) Given patient's continued agitation, consider Haldol 1mg to 2mg IV q6hr PRN for delirium and agitation.  QTc noted.  2) Psych will follow 1) Given patient's continued agitation, consider Haldol 1mg to 2mg IV q6hr PRN agitation, monitor EKG for QTc<500  2) Psych will follow

## 2019-08-12 NOTE — BEHAVIORAL HEALTH ASSESSMENT NOTE - AXIS III
R hip fracture s/p IMN on 6/25/19, CAD s/p CABGx3 >20 years ago, HFpEF, HTN, mild intermittent asthma, anemia

## 2019-08-12 NOTE — PROGRESS NOTE ADULT - PROBLEM SELECTOR PLAN 7
DVT Prophylaxis: SCDs in setting of GI bleed  Diet: soft diet  Dispo: Pending PT and stable CBC  GOC: DNR/DNI DVT Prophylaxis: SCDs in setting of GI bleed  Diet: soft diet  Dispo: home, pt declined subacute rehab  GOC: DNR/DNI - c/w albuterol PRN

## 2019-08-12 NOTE — PROGRESS NOTE ADULT - SUBJECTIVE AND OBJECTIVE BOX
Chief Complaint: Anemia    Interval Events:   - The patient was yelling at the nursing staff and repeatedly told them to "stop touching me."   - The patient was unable to tolerate examination     Allergies:  aspirin (Other)    Hospital Medications:  ALBUTerol/ipratropium for Nebulization 3 milliLiter(s) Nebulizer every 6 hours PRN  atorvastatin 40 milliGRAM(s) Oral at bedtime  ferrous    sulfate 325 milliGRAM(s) Oral daily  LORazepam   Injectable 0.25 milliGRAM(s) IV Push once  pantoprazole  Injectable 40 milliGRAM(s) IV Push every 12 hours    PMHX/PSHX:  No pertinent past medical history  Status post hip surgery    ROS: Unable to obtain    PHYSICAL EXAM:   Vital Signs:  Vital Signs Last 24 Hrs  T(C): 36.7 (11 Aug 2019 21:02), Max: 36.7 (11 Aug 2019 21:02)  T(F): 98 (11 Aug 2019 21:02), Max: 98 (11 Aug 2019 21:02)  HR: 85 (11 Aug 2019 21:02) (81 - 85)  BP: 151/71 (11 Aug 2019 21:02) (133/64 - 151/71)  BP(mean): --  RR: 18 (11 Aug 2019 21:02) (18 - 18)  SpO2: 96% (11 Aug 2019 21:02) (96% - 96%)    Physical Exam (Unable to perform complete exam)    GENERAL: Patient agitated  HEENT:  Normocephalic/atraumatic  CHEST:  No accessory muscle use  NEURO:  Patient agitated    LABS:                        9.6    8.4   )-----------( 320      ( 11 Aug 2019 17:35 )             30.0     Mean Cell Volume: 83.8 fl (08-11-19 @ 17:35)    08-11    140  |  103  |  22  ----------------------------<  94  3.6   |  26  |  0.85    Ca    8.1<L>      11 Aug 2019 04:30  Phos  3.8     08-11  Mg     2.1     08-11    TPro  5.3<L>  /  Alb  3.1<L>  /  TBili  0.7  /  DBili  x   /  AST  12  /  ALT  10  /  AlkPhos  110  08-11    LIVER FUNCTIONS - ( 11 Aug 2019 04:30 )  Alb: 3.1 g/dL / Pro: 5.3 g/dL / ALK PHOS: 110 U/L / ALT: 10 U/L / AST: 12 U/L / GGT: x           PT/INR - ( 11 Aug 2019 04:30 )   PT: 11.7 sec;   INR: 1.02 ratio      PTT - ( 11 Aug 2019 04:30 )  PTT:24.0 sec               9.6    8.4   )-----------( 320      ( 11 Aug 2019 17:35 )             30.0                         9.3    6.9   )-----------( 307      ( 11 Aug 2019 08:31 )             29.7                         8.8    7.6   )-----------( 318      ( 11 Aug 2019 04:30 )             28.1                         8.8    8.0   )-----------( 291      ( 11 Aug 2019 00:05 )             28.1                         5.4    8.0   )-----------( 327      ( 10 Aug 2019 11:10 )             17.4     Imaging:    No new imaging

## 2019-08-12 NOTE — PROVIDER CONTACT NOTE (OTHER) - ACTION/TREATMENT ORDERED:
Refusal noted.
stat med ordered
STAT ativan ordered and given to pt.
Will d/c restraint
Will d/c restraint
provider to rn to hold blood draw for cbc until morning labs

## 2019-08-12 NOTE — BEHAVIORAL HEALTH ASSESSMENT NOTE - SUMMARY
Patient is a 89 year old woman, lives alone with 24hr aides, no PPH, PMH of R hip fracture s/p IMN on 6/25/19, CAD s/p CABGx3 >20 years ago, HFpEF, HTN, mild intermittent asthma, presents with increasing fatigue and found to have hgb 4.8 on outpatient labs, now hgb of 9.4 after transfusions, FOBT+, patient and family do not want additional procedures.  Psychiatry consulted for agitation and delirium. Patient is a 89 year old woman, lives alone with 24hr aides, no PPH, PMH of R hip fracture s/p IMN on 6/25/19, CAD s/p CABGx3 >20 years ago, HFpEF, HTN, mild intermittent asthma, presents with increasing fatigue and found to have hgb 4.8 on outpatient labs, now hgb of 9.4 after transfusions, FOBT+, patient and family do not want additional procedures.  Psychiatry consulted for agitation and delirium.  Pt agitated and restless despite multiple Ativan and Haldol 2mg PRN.  Acute change from baseline per family c/w delirium.

## 2019-08-12 NOTE — BEHAVIORAL HEALTH ASSESSMENT NOTE - RISK ASSESSMENT
Patient is at moderate risk for harm to self or others.  Risk factors: currently delirious with paranoia and delusions, impulsive, disengaged with treatment, does not demonstrate help-seeking behaviors.  Protective factors: supportive family, stable housing, no current or history of SIIP/HIIP, no current or history of SA/SIB or psychiatric admissions.  She does not require inpatient psychiatric hospitalization for safety or stabilization at this time.  Enhanced observation.

## 2019-08-12 NOTE — PROVIDER CONTACT NOTE (OTHER) - SITUATION
Patient refuses assessment
Patient ordered for vest restraint
Patient ordered for vest restraint
Pt. refuses blood draw for q8 cbc
pt. being very combative, restless and confused
pt. confused, thinks they are home wants to get out of bed and not be touched going into other pts rooms

## 2019-08-12 NOTE — BEHAVIORAL HEALTH ASSESSMENT NOTE - NSBHCHARTREVIEWVS_PSY_A_CORE FT
Vital Signs Last 24 Hrs  T(C): 36.7 (11 Aug 2019 21:02), Max: 36.7 (11 Aug 2019 21:02)  T(F): 98 (11 Aug 2019 21:02), Max: 98 (11 Aug 2019 21:02)  HR: 85 (11 Aug 2019 21:02) (85 - 85)  BP: 151/71 (11 Aug 2019 21:02) (151/71 - 151/71)  BP(mean): --  RR: 18 (11 Aug 2019 21:02) (18 - 18)  SpO2: 96% (11 Aug 2019 21:02) (96% - 96%)

## 2019-08-12 NOTE — PROGRESS NOTE ADULT - PROBLEM SELECTOR PLAN 2
UGI 2/2 ulcer vs lower GI bleed 2/2 diverticulosis vs AVM   - GI consult placed  - Plan as above  - Patient/Daughter prefer for conservative management at this time Hgb 4.8 on outpatient lab (baseline hgb 8-9). Anemia is possibly from upper GI bleed given history of ulcer and recent xarelto use. However, CTAP without signs of internal bleeding and pt does not want EGD. Lower suspicion for RP bleed or surgical site bleeding.  - Hgb 5.4 on admission -> 9.4 - Hgb stable  - s/p 2u pRBCs  - pantoprazole 40mg IV BID  - active T&S  - 2 large bore IVs

## 2019-08-12 NOTE — BEHAVIORAL HEALTH ASSESSMENT NOTE - NSBHCHARTREVIEWLAB_PSY_A_CORE FT
9.4    7.8   )-----------( 307      ( 12 Aug 2019 09:38 )             30.5     08-11    140  |  103  |  22  ----------------------------<  94  3.6   |  26  |  0.85    Ca    8.1<L>      11 Aug 2019 04:30  Phos  3.8     08-11  Mg     2.1     08-11    TPro  5.3<L>  /  Alb  3.1<L>  /  TBili  0.7  /  DBili  x   /  AST  12  /  ALT  10  /  AlkPhos  110  08-11

## 2019-08-12 NOTE — PROGRESS NOTE ADULT - PROBLEM SELECTOR PLAN 5
s/p IMN in 06/2019   - Pain control with tylenol PRN  - Ambulate as tolerated with assistance  - Fall precautions  - No NSAIDs s/p CABG >20 yrs ago  - Not on ASA given hx of GI bleeding  - No chest pain at this time

## 2019-08-12 NOTE — PROVIDER CONTACT NOTE (OTHER) - ASSESSMENT
Patient is screaming get away from me, will not let RN touch patient
Patient agitated but will not benefit from vest restraint  restraint will cause patient to become more agitated and more combative   patient does not respond well to touch/objects on her   patient's agitation will increase
Patient agitated but will not benefit from vest restraint  restraint will cause patient to become more agitated and more combative   patient does not respond well to touch/objects on her   patient's agitation will increase
Pt. A&oX1, vs /71 hr 85 temp 36.7 RR 18 spo2 96 room air. pt. confused
Pt. A&ox1 vs: /71 HR 85 Temp 36.7 R 18 spo2 96. PT very restless and confused thinks they are home, refused help from staff
pt. A&oX1. pt. being very combative, restless and confused. does not want to be touched, is not following directions.

## 2019-08-12 NOTE — PROGRESS NOTE ADULT - PROBLEM SELECTOR PLAN 4
s/p CABG >20 yrs ago  - Not on ASA given hx of GI bleeding  - No chest pain at this time Last TTE 06/2019 showed EF 60% with stage I diastolic dysfunction  - On Lasix 40mg PO daily at home  - s/p IV lasix 40mgx1, 20mgx1    - Strict I/Os  - Daily weight

## 2019-08-12 NOTE — BEHAVIORAL HEALTH ASSESSMENT NOTE - NSBHCHARTREVIEWINVESTIGATE_PSY_A_CORE FT
8/12/19  QTc 486    < from: 12 Lead ECG (06.24.19 @ 21:56) >    QTC Calculation(Bezet) 488 ms    < end of copied text >

## 2019-08-12 NOTE — PROGRESS NOTE ADULT - PROBLEM SELECTOR PLAN 6
- c/w albuterol PRN s/p IMN in 06/2019   - Pain control with tylenol PRN  - Ambulate as tolerated with assistance  - Fall precautions  - No NSAIDs

## 2019-08-13 ENCOUNTER — TRANSCRIPTION ENCOUNTER (OUTPATIENT)
Age: 84
End: 2019-08-13

## 2019-08-13 VITALS
OXYGEN SATURATION: 96 % | TEMPERATURE: 98 F | DIASTOLIC BLOOD PRESSURE: 61 MMHG | HEART RATE: 80 BPM | SYSTOLIC BLOOD PRESSURE: 143 MMHG | RESPIRATION RATE: 20 BRPM

## 2019-08-13 LAB
ALBUMIN SERPL ELPH-MCNC: 3.1 G/DL
ALP BLD-CCNC: 102 U/L
ALT SERPL-CCNC: 10 U/L
ANION GAP SERPL CALC-SCNC: 13 MMOL/L
AST SERPL-CCNC: 12 U/L
BASOPHILS # BLD AUTO: 0 K/UL — SIGNIFICANT CHANGE UP (ref 0–0.2)
BASOPHILS # BLD AUTO: 0.02 K/UL
BASOPHILS NFR BLD AUTO: 0.2 % — SIGNIFICANT CHANGE UP (ref 0–2)
BASOPHILS NFR BLD AUTO: 0.3 %
BILIRUB SERPL-MCNC: 0.2 MG/DL
BUN SERPL-MCNC: 29 MG/DL
CALCIUM SERPL-MCNC: 8.5 MG/DL
CHLORIDE SERPL-SCNC: 108 MMOL/L
CO2 SERPL-SCNC: 21 MMOL/L
CREAT SERPL-MCNC: 0.77 MG/DL
EOSINOPHIL # BLD AUTO: 0.04 K/UL
EOSINOPHIL # BLD AUTO: 0.4 K/UL — SIGNIFICANT CHANGE UP (ref 0–0.5)
EOSINOPHIL NFR BLD AUTO: 0.6 %
EOSINOPHIL NFR BLD AUTO: 6.4 % — HIGH (ref 0–6)
FERRITIN SERPL-MCNC: 22 NG/ML
GLUCOSE SERPL-MCNC: 165 MG/DL
HCT VFR BLD CALC: 17.1 %
HCT VFR BLD CALC: 31.6 % — LOW (ref 34.5–45)
HGB BLD-MCNC: 4.8 G/DL
HGB BLD-MCNC: 9.4 G/DL — LOW (ref 11.5–15.5)
IMM GRANULOCYTES NFR BLD AUTO: 0.5 %
IRON SATN MFR SERPL: 14 %
IRON SERPL-MCNC: 39 UG/DL
LYMPHOCYTES # BLD AUTO: 0.53 K/UL
LYMPHOCYTES # BLD AUTO: 0.9 K/UL — LOW (ref 1–3.3)
LYMPHOCYTES # BLD AUTO: 15.9 % — SIGNIFICANT CHANGE UP (ref 13–44)
LYMPHOCYTES NFR BLD AUTO: 8.4 %
MAN DIFF?: NORMAL
MCHC RBC-ENTMCNC: 24.2 PG
MCHC RBC-ENTMCNC: 25.4 PG — LOW (ref 27–34)
MCHC RBC-ENTMCNC: 28.1 GM/DL
MCHC RBC-ENTMCNC: 29.9 GM/DL — LOW (ref 32–36)
MCV RBC AUTO: 84.9 FL — SIGNIFICANT CHANGE UP (ref 80–100)
MCV RBC AUTO: 86.4 FL
MONOCYTES # BLD AUTO: 0.8 K/UL — SIGNIFICANT CHANGE UP (ref 0–0.9)
MONOCYTES # BLD AUTO: 0.83 K/UL
MONOCYTES NFR BLD AUTO: 13.2 %
MONOCYTES NFR BLD AUTO: 13.2 % — SIGNIFICANT CHANGE UP (ref 2–14)
NEUTROPHILS # BLD AUTO: 3.8 K/UL — SIGNIFICANT CHANGE UP (ref 1.8–7.4)
NEUTROPHILS # BLD AUTO: 4.85 K/UL
NEUTROPHILS NFR BLD AUTO: 64.2 % — SIGNIFICANT CHANGE UP (ref 43–77)
NEUTROPHILS NFR BLD AUTO: 77 %
PLATELET # BLD AUTO: 287 K/UL
PLATELET # BLD AUTO: 309 K/UL — SIGNIFICANT CHANGE UP (ref 150–400)
POTASSIUM SERPL-SCNC: 4 MMOL/L
PROT SERPL-MCNC: 5.1 G/DL
RBC # BLD: 1.98 M/UL
RBC # BLD: 3.72 M/UL — LOW (ref 3.8–5.2)
RBC # FLD: 15.3 % — HIGH (ref 10.3–14.5)
RBC # FLD: 16.7 %
SODIUM SERPL-SCNC: 142 MMOL/L
TIBC SERPL-MCNC: 271 UG/DL
TRANSFERRIN SERPL-MCNC: 223 MG/DL
TSH SERPL-MCNC: 4.94 UIU/ML — HIGH (ref 0.27–4.2)
UIBC SERPL-MCNC: 232 UG/DL
VIT B12 SERPL-MCNC: 397 PG/ML — SIGNIFICANT CHANGE UP (ref 232–1245)
WBC # BLD: 5.8 K/UL — SIGNIFICANT CHANGE UP (ref 3.8–10.5)
WBC # FLD AUTO: 5.8 K/UL — SIGNIFICANT CHANGE UP (ref 3.8–10.5)
WBC # FLD AUTO: 6.3 K/UL

## 2019-08-13 PROCEDURE — 86850 RBC ANTIBODY SCREEN: CPT

## 2019-08-13 PROCEDURE — 85027 COMPLETE CBC AUTOMATED: CPT

## 2019-08-13 PROCEDURE — 82272 OCCULT BLD FECES 1-3 TESTS: CPT

## 2019-08-13 PROCEDURE — 85610 PROTHROMBIN TIME: CPT

## 2019-08-13 PROCEDURE — 84443 ASSAY THYROID STIM HORMONE: CPT

## 2019-08-13 PROCEDURE — 86923 COMPATIBILITY TEST ELECTRIC: CPT

## 2019-08-13 PROCEDURE — 99232 SBSQ HOSP IP/OBS MODERATE 35: CPT | Mod: GC

## 2019-08-13 PROCEDURE — 84100 ASSAY OF PHOSPHORUS: CPT

## 2019-08-13 PROCEDURE — 83735 ASSAY OF MAGNESIUM: CPT

## 2019-08-13 PROCEDURE — 93005 ELECTROCARDIOGRAM TRACING: CPT

## 2019-08-13 PROCEDURE — 86901 BLOOD TYPING SEROLOGIC RH(D): CPT

## 2019-08-13 PROCEDURE — 99239 HOSP IP/OBS DSCHRG MGMT >30: CPT | Mod: GC

## 2019-08-13 PROCEDURE — 80053 COMPREHEN METABOLIC PANEL: CPT

## 2019-08-13 PROCEDURE — P9016: CPT

## 2019-08-13 PROCEDURE — 36430 TRANSFUSION BLD/BLD COMPNT: CPT

## 2019-08-13 PROCEDURE — 86900 BLOOD TYPING SEROLOGIC ABO: CPT

## 2019-08-13 PROCEDURE — 97162 PT EVAL MOD COMPLEX 30 MIN: CPT

## 2019-08-13 PROCEDURE — 99291 CRITICAL CARE FIRST HOUR: CPT | Mod: 25

## 2019-08-13 PROCEDURE — 74176 CT ABD & PELVIS W/O CONTRAST: CPT

## 2019-08-13 PROCEDURE — 82607 VITAMIN B-12: CPT

## 2019-08-13 PROCEDURE — 85730 THROMBOPLASTIN TIME PARTIAL: CPT

## 2019-08-13 RX ORDER — FUROSEMIDE 40 MG
1 TABLET ORAL
Qty: 0 | Refills: 0 | DISCHARGE

## 2019-08-13 RX ADMIN — PANTOPRAZOLE SODIUM 40 MILLIGRAM(S): 20 TABLET, DELAYED RELEASE ORAL at 05:35

## 2019-08-13 RX ADMIN — Medication 325 MILLIGRAM(S): at 13:10

## 2019-08-13 NOTE — PROGRESS NOTE BEHAVIORAL HEALTH - NSBHCONSULTMEDS_PSY_A_CORE FT
1) Decrease Haldol to 1mg  q6hr prn for agitation if needed, however delirium appears to be resolving 1) Decrease Haldol to 1mg PO/IV q6hr PRN agitation if needed, however delirium appears to be resolving

## 2019-08-13 NOTE — PROGRESS NOTE ADULT - SUBJECTIVE AND OBJECTIVE BOX
Chief Complaint:  Patient is a 89y old  Female who presents with a chief complaint of anemia (13 Aug 2019 08:08)      Interval Events:   - no acute overnight events    Allergies:  aspirin (Other)      Hospital Medications:  ALBUTerol/ipratropium for Nebulization 3 milliLiter(s) Nebulizer every 6 hours PRN  atorvastatin 40 milliGRAM(s) Oral at bedtime  ferrous    sulfate 325 milliGRAM(s) Oral daily  haloperidol    Injectable 1 milliGRAM(s) IV Push every 6 hours PRN  melatonin 3 milliGRAM(s) Oral at bedtime  pantoprazole  Injectable 40 milliGRAM(s) IV Push every 12 hours      PMHX/PSHX:  No pertinent past medical history  Status post hip surgery  No significant past surgical history      Family history:      ROS:     General:  No wt loss, fevers, chills, night sweats, fatigue,   Eyes:  Good vision, no reported pain  ENT:  No sore throat, pain, runny nose, dysphagia  CV:  No pain, palpitations, hypo/hypertension  Resp:  No dyspnea, cough, tachypnea, wheezing  GI:  See HPI  :  No pain, bleeding, incontinence, nocturia  Muscle:  No pain, weakness  Neuro:  No weakness, tingling, memory problems  Psych:  No fatigue, insomnia, mood problems, depression  Endocrine:  No polyuria, polydipsia, cold/heat intolerance  Heme:  No petechiae, ecchymosis, easy bruisability  Skin:  No rash, edema      PHYSICAL EXAM:     GENERAL:  Appears stated age, well-groomed, well-nourished, no distress  HEENT:  NC/AT,  conjunctivae clear, sclera -anicteric  CHEST:  Full & symmetric excursion, no increased effort, breath sounds clear  HEART:  Regular rhythm, S1, S2, no murmur/rub/S3/S4,  no edema  ABDOMEN:  Soft, non-tender, non-distended, normoactive bowel sounds,  no masses ,no hepato-splenomegaly,   EXTREMITIES:  no cyanosis,clubbing or edema  SKIN:  No rash/erythema/ecchymoses/petechiae/wounds/abscess/warm/dry  NEURO:  Alert, oriented    Vital Signs:  Vital Signs Last 24 Hrs  T(C): 36.5 (13 Aug 2019 04:49), Max: 36.5 (13 Aug 2019 04:49)  T(F): 97.7 (13 Aug 2019 04:49), Max: 97.7 (13 Aug 2019 04:49)  HR: 72 (13 Aug 2019 04:49) (72 - 74)  BP: 147/67 (13 Aug 2019 04:49) (131/56 - 147/67)  BP(mean): --  RR: 18 (13 Aug 2019 04:49) (18 - 18)  SpO2: 95% (13 Aug 2019 04:49) (95% - 96%)  Daily     Daily     LABS:                        9.4    5.8   )-----------( 309      ( 13 Aug 2019 06:22 )             31.6         Imaging:

## 2019-08-13 NOTE — PROGRESS NOTE ADULT - PROBLEM SELECTOR PLAN 8
DVT Prophylaxis: SCDs in setting of GI bleed  Diet: soft diet  Dispo: home, pt declined subacute rehab  GOC: DNR/DNI
DVT Prophylaxis: SCDs in setting of GI bleed  Diet: soft diet  Dispo: home, pt declined subacute rehab  GOC: DNR/DNI

## 2019-08-13 NOTE — PROGRESS NOTE BEHAVIORAL HEALTH - CASE SUMMARY
Patient is a 89 year old woman, lives alone with 24hr aides, no PPH, PMH of R hip fracture s/p IMN on 6/25/19, CAD s/p CABGx3 >20 years ago, HFpEF, HTN, mild intermittent asthma, presents with increasing fatigue and found to have hgb 4.8 on outpatient labs, now hgb of 9.4 after transfusions, FOBT+, patient and family do not want additional procedures.  Psychiatry consulted for agitation and delirium.  On my examination this morning, pt very lethargic s/p multiple PRNs given including Ativan and 2mg Haldol, unable to arouse despite vigorous tactile stim.  Family notes an acute departure from baseline mental status c/w acute delirium.  8/13: PT alert, oriented to person and being in a hospital, more appropriate and cooperative, no PRNs needed today but received Haldol PRN last night.  Still inattentive.  Delirium appears to be improving.  Dx: Delirium 2/2 GMC.  Agree with resident's assessment and plan as above.

## 2019-08-13 NOTE — PROGRESS NOTE ADULT - PROBLEM SELECTOR PLAN 4
Last TTE 06/2019 showed EF 60% with stage I diastolic dysfunction  - On Lasix 40mg PO daily at home  - s/p IV lasix 40mgx1, 20mgx1    - Strict I/Os  - Daily weight

## 2019-08-13 NOTE — DISCHARGE NOTE NURSING/CASE MANAGEMENT/SOCIAL WORK - NSSCNAMETXT_GEN_ALL_CORE
[facility] will be contacting you within 1-2 days to schedule home visit.

## 2019-08-13 NOTE — PROGRESS NOTE ADULT - PROBLEM SELECTOR PLAN 3
UGI 2/2 ulcer vs lower GI bleed 2/2 diverticulosis vs AVM   - GI consult placed  - Plan as above  - Patient/Daughter prefer for conservative management at this time

## 2019-08-13 NOTE — PROGRESS NOTE ADULT - ATTENDING COMMENTS
Patient seen, examined with house staff in am, Dtr- family member at bedside  - sitting in chair, calm today, afebrile, resolved delirium  - family decided not to have EGD/Colonoscopy for acute blood loss anemia    stable H/H post PRBC transfusion and off AC  - GI and psych inputs appreciated.  - d/c today, outpatient f/u with PCP, closely monitoring H/H   - DNR.  ** discharge time- 37 min
Patient s/p lasix 20mg IV, outputting well and good respiratory status, will need re-assessment tomorrow. Patient does not want any aggressive intervention, will monitor CBC for now.
Patient seen, examined with house staff in am, family member at bedside  - sitting in chair, aggressive, feisty and delirious     transfer to bed, given 2 mg IV Haldol x1, got several doses of Ativan 0.25mg overnight and in am prior to Haldol IV    Called Psych consult  - Delirium is likely related to Hospital psychosis given age, new place and acute medical illness  - Spoke to family at bedside, they decided not to have EGD/Colonoscopy for acute blood loss anemia    stable H/H post PRBC transfusion and off AC  - d/c planning in progress once acute delirium resolves     will send Vit B 12, Folate, RPR and possible CT brain  - DNR

## 2019-08-13 NOTE — PROGRESS NOTE ADULT - ASSESSMENT
89 y/o F PMHx R hip fracture s/p IMN on 6/25/19, CAD s/p CABGx3 >20 years ago (not on aspirin due to hx of GIB), HFpEF, HTN, mild intermittent asthma, presents for hgb 4.8 on outpatient labs, admitted for r/o GI bleed, s/p 2U pRBC.
89 y/o F PMHx R hip fracture s/p IMN on 6/25/19, CAD s/p CABGx3 >20 years ago (not on aspirin due to hx of GIB), HFpEF, HTN, mild intermittent asthma, presents for hgb 4.8 on outpatient labs, admitted for r/o GI bleed, s/p 2U pRBC.
89 year old female with R hip fracture s/p IMN on 6/25/19, CAD s/p CABGx3 >20 years ago (not on aspirin due to hx of GIB), HFpEF, HTN, mild intermittent asthma, DVT on Xarelto (stopped on 8/8/19) presents for hgb 4.8 on outpatient labs, admitted for r/o GI bleed, s/p 2U pRBC    IMPRESSION    - Anemia with drop in Hgb: Currently with no overt GI bleeding, HD stable, Hgb of 5.4-->9.4 s/p 2u pRBC (baseline Hb 7.5 to 9 in 6/2019)   Ddx: peptic ulcer disease due to H. pylori versus NSAID use, angioectasias, esophagitis, erosive gastritis, and gastric or duodenal malignancy. May also represent bleeding from colonic source including colonic angioectasias and colorectal malignancy  - DVT on Xarelto (stopped on 8/8/19)      RECOMMENDATIONS    - trend CBC, transfuse as needed  - patient prefers conservative management and would like to avoid procedures if possible  - can perform urgent endoscopy if patient clinically decompensates  - supportive care as per primary team      Dian Adair, PGY-6  GI fellow  B- [id number]/ [phone]  Please call GI fellow on call after 5pm and on weekends
Impression:    # Anemia with drop in Hgb: Currently with no overt GI bleeding, brown stool on rectal exam, HD stable, and with Hgb of 5.4 down from 7.5 to 9 in 6/2019. May represent bleeding from peptic ulcer disease due to H. pylori versus NSAID use, angioectasias, esophagitis, erosive gastritis, and gastric or duodenal malignancy. BUN:Cr < 36. May also represent bleeding from colonic source including colonic angioectasias and colorectal malignancy. Differential includes internal bleeding into right hip/thigh.   # CAD s/p CABG: Not on anti-platelets  # HFpEF  # DVT on Xarelto (stopped on 8/8/19)  # Asthma    Recommendations:  - The patient prefers conservative management and would like to avoid procedures if possible  - Can perform urgent endoscopy if patient clinically decompensates  - Would check CT A/P w/ IV contrast to rule out internal bleeding / hematoma  - Clear liquid diet  - Monitor CBCs q8h  - Monitor bowel movements  - Transfuse for goal Hgb >/= 7.0  - PPI IV BID  - Two large bore IVs  - Maintain active type and screen  - Rest of care per primary team    Please call GI ([phone]) if there are any additional questions or concerns. Please call on-call GI fellow after 5pm and before 8am, and on weekends.
89 y/o F PMHx R hip fracture s/p IMN on 6/25/19, CAD s/p CABGx3 >20 years ago (not on aspirin due to hx of GIB), HFpEF, HTN, mild intermittent asthma, presents for hgb 4.8 on outpatient labs, admitted for r/o GI bleed, s/p 2U pRBC.

## 2019-08-13 NOTE — PROGRESS NOTE ADULT - SUBJECTIVE AND OBJECTIVE BOX
Patient is a 89y old  Female who presents with a chief complaint of anemia (12 Aug 2019 08:58)      INTERVAL HPI/OVERNIGHT EVENTS:  Overnight pt slept and was calm. This morning she is confused (A&Ox1) but redirectable. Unable to recall why she came into the hospital, but when reminded, she said "oh, i remember now." Pt reports feeling well. Denies SOB, CP, abdominal pain. Pt states she wants to go home.          Vital Signs Last 24 Hrs  T(C): 36.5 (13 Aug 2019 04:49), Max: 36.5 (13 Aug 2019 04:49)  T(F): 97.7 (13 Aug 2019 04:49), Max: 97.7 (13 Aug 2019 04:49)  HR: 72 (13 Aug 2019 04:49) (72 - 74)  BP: 147/67 (13 Aug 2019 04:49) (131/56 - 147/67)-  RR: 18 (13 Aug 2019 04:49) (18 - 18)  SpO2: 95% (13 Aug 2019 04:49) (95% - 96%)    PHYSICAL EXAM:  GENERAL: NAD. Calm, very pleasant. Cooperative.   HEENT: Atraumatic, NC. EOMI.  CHEST/LUNG: Clear to auscultation; No rales, rhonchi, or wheezing.  Respiratory effort does not appear labored.  HEART: Regular rate and rhythm; S1 and S2,  no murmurs, rubs, or gallops.  ABDOMEN: Soft, not tender to palpation.  No masses or HSM appreciated.  No distension.   EXTREMITIES:  trace edema bilaterally.  Moves all extremities  SKIN: No obvious rashes or lesions.  Turgor okay.  NEURO:  Alert and oriented x 1, no focal sensory or  motor deficit    LABS:                        9.4    5.8   )-----------( 309      ( 13 Aug 2019 06:22 )             31.6     Hgb: stable from yesterday Patient is a 89y old  Female who presents with a chief complaint of anemia (12 Aug 2019 08:58)      INTERVAL HPI/OVERNIGHT EVENTS:  Overnight pt slept and was calm. This morning she is confused (A&Ox1) but redirectable. Unable to recall why she came into the hospital, but when reminded, she said "oh, i remember now." Pt reports feeling well. Denies SOB, CP, abdominal pain. Pt states she wants to go home.  Seen again in the late morning - pt is back at baseline. She is able to recall why she came to the hospital, A&Ox2.       Vital Signs Last 24 Hrs  T(C): 36.5 (13 Aug 2019 04:49), Max: 36.5 (13 Aug 2019 04:49)  T(F): 97.7 (13 Aug 2019 04:49), Max: 97.7 (13 Aug 2019 04:49)  HR: 72 (13 Aug 2019 04:49) (72 - 74)  BP: 147/67 (13 Aug 2019 04:49) (131/56 - 147/67)-  RR: 18 (13 Aug 2019 04:49) (18 - 18)  SpO2: 95% (13 Aug 2019 04:49) (95% - 96%)    PHYSICAL EXAM:  GENERAL: NAD. Calm, very pleasant. Cooperative.   HEENT: Atraumatic, NC. EOMI.  CHEST/LUNG: Clear to auscultation; No rales, rhonchi, or wheezing.  Respiratory effort does not appear labored.  HEART: Regular rate and rhythm; S1 and S2,  no murmurs, rubs, or gallops.  ABDOMEN: Soft, not tender to palpation.  No masses or HSM appreciated.  No distension.   EXTREMITIES:  trace edema bilaterally.  Moves all extremities  SKIN: No obvious rashes or lesions.  Turgor okay.  NEURO:  Alert and oriented x 1, no focal sensory or  motor deficit    LABS:                        9.4    5.8   )-----------( 309      ( 13 Aug 2019 06:22 )             31.6     Hgb: stable from yesterday

## 2019-08-13 NOTE — PROGRESS NOTE ADULT - PROBLEM SELECTOR PLAN 6
s/p IMN in 06/2019   - Pain control with tylenol PRN  - Ambulate as tolerated with assistance  - Fall precautions  - No NSAIDs

## 2019-08-13 NOTE — DISCHARGE NOTE NURSING/CASE MANAGEMENT/SOCIAL WORK - NSDCDPATPORTLINK_GEN_ALL_CORE
You can access the [facility], offered by [facility], by registering with the following website: [email or web address]

## 2019-08-13 NOTE — PROGRESS NOTE BEHAVIORAL HEALTH - NSBHFUPINTERVALHXFT_PSY_A_CORE
Patient started on Haldol 1-2mg q6hr prn yesterday, received a total of 3mg Haldol.  Did not require any Haldol overnight.  On interview, patient is awake, alert, sitting in chair, pleasant, calm, cooperative, with adequate eye contact.  Initially patient observed to be muttering to self "I'm going to go home," but engaged easily with interview, not seen responding to internal stimuli.  A&Ox2 (did not know where she was or the year; knew self and situation), could not spell "world" backwards, could identify watch and pen.  When asked about events over the last day, she does remember feeling frustrated, states she is apologetic for the way she behaved.

## 2019-08-13 NOTE — PROGRESS NOTE ADULT - PROBLEM SELECTOR PLAN 2
Hgb 4.8 on outpatient lab (baseline hgb 8-9). Anemia is possibly from upper GI bleed given history of ulcer and recent xarelto use. However, CTAP without signs of internal bleeding and pt does not want EGD. Lower suspicion for RP bleed or surgical site bleeding.  - Hgb 5.4 on admission -> 9.4 - Hgb stable  - s/p 2u pRBCs  - pantoprazole 40mg IV BID  - active T&S  - 2 large bore IVs Hgb 4.8 on outpatient lab (baseline hgb 8-9). Anemia is possibly from upper GI bleed given history of ulcer and recent xarelto use. However, CTAP without signs of internal bleeding and pt does not want EGD. Lower suspicion for RP bleed or surgical site bleeding.  - Hgb 5.4 on admission -> 9.4 - Hgb stable  - s/p 2u pRBCs

## 2019-08-13 NOTE — PROGRESS NOTE BEHAVIORAL HEALTH - NSBHCHARTREVIEWVS_PSY_A_CORE FT
Vital Signs Last 24 Hrs  T(C): 36.6 (13 Aug 2019 13:47), Max: 36.6 (13 Aug 2019 13:47)  T(F): 97.8 (13 Aug 2019 13:47), Max: 97.8 (13 Aug 2019 13:47)  HR: 75 (13 Aug 2019 13:47) (72 - 75)  BP: 125/69 (13 Aug 2019 13:47) (125/69 - 147/67)  BP(mean): --  RR: 18 (13 Aug 2019 13:47) (18 - 18)  SpO2: 97% (13 Aug 2019 13:47) (95% - 97%)

## 2019-08-13 NOTE — PROGRESS NOTE ADULT - PROBLEM SELECTOR PLAN 1
Pt was delirious yesterday - however, significantly improved today. Confused but redirectable.   - s/p ativan 0.25mg x4, haldol 2mg x2 Pt was delirious yesterday - however, significantly improved today. Pt is back at baseline.   - s/p ativan 0.25mg x4, haldol 2mg x2

## 2019-08-14 ENCOUNTER — CLINICAL ADVICE (OUTPATIENT)
Age: 84
End: 2019-08-14

## 2019-08-14 PROBLEM — Z87.81 HISTORY OF HIP FRACTURE: Status: ACTIVE | Noted: 2019-08-14

## 2019-08-14 NOTE — ASSESSMENT
[FreeTextEntry1] : s/p fall with hip fracture s/p repair.\par pain:  plan to start oxycodone for short course for pain.  she is not taking cyclobenzaprine, advised to not take with oxycodone.  c/w PT,  fall precautions, wheelchair/walker use.\par Would check routine labs. hx of peptic ulcer with GI bleed in the past.  denies any bleeding currently.  was suppose to end noac for dvt ppx during rehab, but was discharged on.  advised to stop today. remains on carafate.\par hypotension: hold verapamil, and start home bp blood pressure and HR checks. \par edema: remains of lasix and K:  repeat labs.  \par \par *update:  hgb returned at 4.8-->on call doc referred to ER:  hospitalized with suspected UGI bleed, transfused 2 units, declined EGD, stable hgb post transfusion.  asa also held.\par discharged home.\par \par

## 2019-08-14 NOTE — HISTORY OF PRESENT ILLNESS
[FreeTextEntry1] : 89 female with Dementia, CHF, CAD, HLD, Spinal Stenosis, Right hip fracture comes in for follow up visit. \par \par History is obtained from Daughter. \par Patient had recent hip fracture, 6/25. Uses walker at home. No falls since that time. \par Has visiting nurse and PT comes at home. \par On Dvt ppx with Xarelto. \par \par Spinal stenosis, causing neck pain: Was given oxycodone 5 daily by neurologist and she tolerated it. \par \par \par \par \par

## 2019-08-16 ENCOUNTER — APPOINTMENT (OUTPATIENT)
Dept: GERIATRICS | Facility: CLINIC | Age: 84
End: 2019-08-16
Payer: MEDICARE

## 2019-08-16 VITALS
DIASTOLIC BLOOD PRESSURE: 60 MMHG | OXYGEN SATURATION: 97 % | RESPIRATION RATE: 17 BRPM | TEMPERATURE: 97.6 F | WEIGHT: 130 LBS | BODY MASS INDEX: 25.52 KG/M2 | SYSTOLIC BLOOD PRESSURE: 136 MMHG | HEART RATE: 75 BPM | HEIGHT: 60 IN

## 2019-08-16 DIAGNOSIS — R60.0 LOCALIZED EDEMA: ICD-10-CM

## 2019-08-16 PROCEDURE — 99496 TRANSJ CARE MGMT HIGH F2F 7D: CPT

## 2019-08-16 RX ORDER — CYCLOBENZAPRINE HYDROCHLORIDE 5 MG/1
5 TABLET, FILM COATED ORAL
Refills: 0 | Status: DISCONTINUED | COMMUNITY
Start: 2019-05-23 | End: 2019-08-16

## 2019-08-16 NOTE — HISTORY OF PRESENT ILLNESS
[FreeTextEntry1] : 89yoF seen after hospitalization.  I spoke with dtr 2 days ago when patient was discharged.\par \par she had severe anemia of 4.8, for presumed GI bleed in setting of Xarelto use.\par hospitalized with suspected UGI bleed, transfused 2 units, declined EGD, stable hgb post transfusion. asa also held.  episode of delirium in hospital requiring haldol likely in setting of lack of sleep.  resolved and back to baseline prior to discharge.\par discharged home.\par \par now with increased cough and lower ext edema.\par restarted lasix 2 days ago at 20mg daily.\par denies fever, chills, ab pain.\par \par difficulty sleeping:  chronically, does not take any medication\par

## 2019-08-16 NOTE — PHYSICAL EXAM
[General Appearance - Alert] : alert [Sclera] : the sclera and conjunctiva were normal [General Appearance - In No Acute Distress] : in no acute distress [No Oral Cyanosis] : no oral cyanosis [Extraocular Movements] : extraocular movements were intact [Neck Appearance] : the appearance of the neck was normal [Respiration, Rhythm And Depth] : normal respiratory rhythm and effort [] : no respiratory distress [Exaggerated Use Of Accessory Muscles For Inspiration] : no accessory muscle use [Heart Rate And Rhythm] : heart rate was normal and rhythm regular [Heart Sounds] : normal S1 and S2 [Abdomen Soft] : soft [Bowel Sounds] : normal bowel sounds [Abdomen Tenderness] : non-tender [Involuntary Movements] : no involuntary movements were seen [Nail Clubbing] : no clubbing  or cyanosis of the fingernails [Mood] : the mood was normal [Affect] : the affect was normal [FreeTextEntry1] : 2+ pitting edema of lower legs

## 2019-08-16 NOTE — ASSESSMENT
[FreeTextEntry1] : anemia:  no longer aspirin or xarelto (was on for dvt ppx post hip fracture).\par suspected ugi bleed with hx of peptic ulcer: c/w carafate.  follow up labs  s/p 2 U in hospital with d/c hgb ~9.\par lower ext edema: increase lasix to 40mg daily, c/w KCL supplement, f/u labwork.\par continue to monitor off verapamil  bp controlled today.\par cough: likely 2/2 volume overload, increase lasix.\par sleep disturbance: trial of melatonin 1 mg\par \par

## 2019-08-16 NOTE — REVIEW OF SYSTEMS
[Feeling Tired] : feeling tired [Eyesight Problems] : eyesight problems [Loss Of Hearing] : hearing loss [Lower Ext Edema] : lower extremity edema [SOB on Exertion] : shortness of breath during exertion [Cough] : cough [Negative] : Gastrointestinal

## 2019-08-19 LAB
ANION GAP SERPL CALC-SCNC: 10 MMOL/L
BASOPHILS # BLD AUTO: 0.06 K/UL
BASOPHILS NFR BLD AUTO: 1 %
BUN SERPL-MCNC: 27 MG/DL
CALCIUM SERPL-MCNC: 8.4 MG/DL
CHLORIDE SERPL-SCNC: 109 MMOL/L
CO2 SERPL-SCNC: 23 MMOL/L
CREAT SERPL-MCNC: 0.73 MG/DL
EOSINOPHIL # BLD AUTO: 0.25 K/UL
EOSINOPHIL NFR BLD AUTO: 4.2 %
GLUCOSE SERPL-MCNC: 142 MG/DL
HCT VFR BLD CALC: 30.2 %
HGB BLD-MCNC: 8.6 G/DL
IMM GRANULOCYTES NFR BLD AUTO: 0.3 %
LYMPHOCYTES # BLD AUTO: 0.93 K/UL
LYMPHOCYTES NFR BLD AUTO: 15.7 %
MAN DIFF?: NORMAL
MCHC RBC-ENTMCNC: 24.9 PG
MCHC RBC-ENTMCNC: 28.5 GM/DL
MCV RBC AUTO: 87.3 FL
MONOCYTES # BLD AUTO: 0.8 K/UL
MONOCYTES NFR BLD AUTO: 13.5 %
NEUTROPHILS # BLD AUTO: 3.85 K/UL
NEUTROPHILS NFR BLD AUTO: 65.3 %
PLATELET # BLD AUTO: 294 K/UL
POTASSIUM SERPL-SCNC: 4.4 MMOL/L
RBC # BLD: 3.46 M/UL
RBC # FLD: 16.8 %
SODIUM SERPL-SCNC: 142 MMOL/L
WBC # FLD AUTO: 5.91 K/UL

## 2019-08-26 ENCOUNTER — RX RENEWAL (OUTPATIENT)
Age: 84
End: 2019-08-26

## 2019-09-13 NOTE — PATIENT PROFILE ADULT - BRADEN SCORE
Full note to follow.  UTI w/ obstruction  S/P cysto stent  Continue antibiotics  F/U Cx  Thank you for the courtesy of this referral.  Royce Moss MD  [phone]  ------------------------------  [facility], Division of Infectious Diseases  Morales Moss, ZHEN Hanna, RANJIT Salmeron    DORIS, NASRA  52y, Female  [id number]    HPI--  *** insert HPI ***    PMH/PSH--  Obstructive sleep apnea  Obesity  Arrhythmia  GERD (gastroesophageal reflux disease)  Breast cancer  Bladder cancer  Sleep apnea  Personal history of arthritis  Asthma  History of hypertension  S/P hysterectomy  Status post hysteroscopy  Right ankle pain  History of left breast cancer  H/O cystoscopy  S/P D&C (status post dilation and curettage)      Allergies--      Medications--  Antibiotics: piperacillin/tazobactam IVPB.. 3.375 Gram(s) IV Intermittent every 8 hours    Immunologic: influenza   Vaccine 0.5 milliLiter(s) IntraMuscular once    Other: ALBUTerol    0.083% PRN  buDESOnide 160 MICROgram(s)/formoterol 4.5 MICROgram(s) Inhaler  carvedilol  dextrose 40% Gel PRN  dextrose 5%.  dextrose 50% Injectable  dextrose 50% Injectable  dextrose 50% Injectable  docusate sodium  glucagon  Injectable PRN  heparin  Injectable  HYDROmorphone  Injectable PRN  HYDROmorphone  Injectable PRN  insulin lispro (HumaLOG) corrective regimen sliding scale  insulin lispro (HumaLOG) corrective regimen sliding scale  montelukast  ondansetron Injectable PRN  oxyCODONE    5 mG/acetaminophen 325 mG PRN  pantoprazole    Tablet  phenazopyridine  polyethylene glycol 3350  senna      Social History--  EtOH: denies ***  Tobacco: denies ***  Drug Use: denies ***    Family/Marital History--  FHx: hypertension  Family history of diabetes mellitus in brother  Family history of brain aneurysm    Remainder not relevant to clinical concern.    Travel/Environmental/Occupational History:  *** insert T/E/O Hx ***    Review of Systems:  A >=10-point review of systems was obtained.     Pertinent positives and negatives--  Constitutional: No fevers. No Chills. No Rigors.   Eyes:  ENMT:  Cardiovascular: No chest pain. No palpitations.  Respiratory: No shortness of breath. No cough.  Gastrointestinal: No nausea or vomiting. No diarrhea or constipation.   Genitourinary:  Musculoskeletal:  Skin:  Neurologic:  Psychiatric: Pleasant. Appropriate affect.  Endocrine:  Heme/Lymphatic:  Allergy/Immunologic:    Review of systems otherwise negative except as previously noted.    Physical Exam--  Vital Signs: T(F): 98.2 (09-13-19 @ 16:39), Max: 99.1 (09-12-19 @ 20:50)  HR: 66 (09-13-19 @ 16:39)  BP: 129/84 (09-13-19 @ 16:39)  RR: 18 (09-13-19 @ 16:39)  SpO2: 93% (09-13-19 @ 16:39)  Wt(kg): --  General: Nontoxic-appearing Female in no acute distress.  HEENT: AT/NC. PERRL. EOMI. Anicteric. Conjunctiva pink and moist. Oropharynx clear. Dentition fair.  Neck: Not rigid. No sense of mass.  Nodes: None palpable.  Lungs: Clear bilaterally without rales, wheezing or rhonchi  Heart: Regular rate and rhythm. No Murmur. No rub. No gallop. No palpable thrill.  Abdomen: Bowel sounds present and normoactive. Soft. Nondistended. Nontender. No sense of mass. No organomegaly.  Back: No spinal tenderness. No costovertebral angle tenderness.   Extremities: No cyanosis or clubbing. No edema.   Skin: Warm. Dry. Good turgor. No rash. No vasculitic stigmata.  Psychiatric: Appropriate affect and mood for situation.         Laboratory & Imaging Data--  CBC                        10.3   16.15 )-----------( 280      ( 13 Sep 2019 06:06 )             32.4       Chemistries  09-13    143  |  107  |  30<H>  ----------------------------<  166<H>  4.2   |  29  |  1.60<H>    Ca    7.9<L>      13 Sep 2019 06:06  Phos  3.2     09-12  Mg     1.8     09-12    TPro  6.3  /  Alb  3.0<L>  /  TBili  0.5  /  DBili  x   /  AST  97<H>  /  ALT  59  /  AlkPhos  125<H>  09-13      Culture Data          Assessment--      Suggestions--        Royce Moss MD  [phone] Full note to follow.  UTI w/ obstruction  S/P cysto stent  Continue antibiotics  F/U Cx  Thank you for the courtesy of this referral.  Royce Moss MD  [phone]  ------------------------------  [facility], Division of Infectious Diseases  Morales Moss, ZHEN Hanna, RANJIT Salmeron    DORIS, NASRA  52y, Female  [id number]    HPI--  52F with multiple medical issues including L breast cancer s/p lumpectomy & sentinel node excision, Bladder cancer (superficial based on patient description), obesity, SUSU, initially had gross hematuria folllowed by low abdominal pain. Patient though she had a UTI but the next day her symptoms were better and she delayed calling her urologist. However subsequently patient developed R flank pain. Denief fevers/chills. +N/V. She presented to the ER and CT revealed  obstructing R ureteral calculus. She underwent emergent cysto/stent. She still has pain in R flank but is overall feeling better.     PMH/PSH--  Obstructive sleep apnea  Obesity  Arrhythmia  GERD (gastroesophageal reflux disease)  Breast cancer  Bladder cancer  Sleep apnea  Personal history of arthritis  Asthma  History of hypertension  S/P hysterectomy  Status post hysteroscopy  Right ankle pain  History of left breast cancer  H/O cystoscopy  S/P D&C (status post dilation and curettage)      Allergies-- denies.       Medications--  Antibiotics: piperacillin/tazobactam IVPB.. 3.375 Gram(s) IV Intermittent every 8 hours    Immunologic: influenza   Vaccine 0.5 milliLiter(s) IntraMuscular once    Other: ALBUTerol    0.083% PRN  buDESOnide 160 MICROgram(s)/formoterol 4.5 MICROgram(s) Inhaler  carvedilol  dextrose 40% Gel PRN  dextrose 5%.  dextrose 50% Injectable  dextrose 50% Injectable  dextrose 50% Injectable  docusate sodium  glucagon  Injectable PRN  heparin  Injectable  HYDROmorphone  Injectable PRN  HYDROmorphone  Injectable PRN  insulin lispro (HumaLOG) corrective regimen sliding scale  insulin lispro (HumaLOG) corrective regimen sliding scale  montelukast  ondansetron Injectable PRN  oxyCODONE    5 mG/acetaminophen 325 mG PRN  pantoprazole    Tablet  phenazopyridine  polyethylene glycol 3350  senna      Social History--  EtOH: vert rare  Tobacco: quit tobacco w Chantix Jan 2019.  Drug Use: denies     Family/Marital History--  FHx: hypertension  Family history of diabetes mellitus in brother  Family history of brain aneurysm    Remainder not relevant to clinical concern.    Travel/Environmental/Occupational History:  Many pets in home (dogs, birds, 8 cats)    Review of Systems:  A >=10-point review of systems was obtained.     Pertinent positives and negatives--  Constitutional: No fevers. No Chills. No Rigors.   Eyes: denies.   ENMT: denies.   Cardiovascular: No chest pain. No palpitations.  Respiratory: No shortness of breath. No cough.  Gastrointestinal: +nausea &vomiting. No diarrhea or constipation.   Genitourinary: as above  Musculoskeletal: osteoarhtritis esopecially in knee  Skin: denies.   Neurologic: denies.   Psychiatric: Pleasant. Appropriate affect.  Endocrine: denies.   Heme/Lymphatic: denies.   Allergy/Immunologic: denies.     Review of systems otherwise negative except as previously noted.    Physical Exam--  Vital Signs: T(F): 98.2 (09-13-19 @ 16:39), Max: 99.1 (09-12-19 @ 20:50)  HR: 66 (09-13-19 @ 16:39)  BP: 129/84 (09-13-19 @ 16:39)  RR: 18 (09-13-19 @ 16:39)  SpO2: 93% (09-13-19 @ 16:39)  Wt(kg): --  General: Nontoxic-appearing Female in no acute distress.  HEENT: AT/NC. PERRL. EOMI. Anicteric. Conjunctiva pink and moist. Oropharynx clear. Dentition fair.  Neck: Not rigid. No sense of mass.  Nodes: None palpable.  Lungs: Clear bilaterally without rales, wheezing or rhonchi  Heart: Regular rate and rhythm. No Murmur. No rub. No gallop. No palpable thrill.  Abdomen: Bowel sounds present and normoactive. Soft. Nondistended. Nontender. No sense of mass. No organomegaly. Obese.   Back: No spinal tenderness. Mild R costovertebral angle tenderness.   Extremities: No cyanosis or clubbing. No edema.   Skin: Warm. Dry. Good turgor. No rash. No vasculitic stigmata.  Psychiatric: Appropriate affect and mood for situation.         Laboratory & Imaging Data--  CBC                        10.3   16.15 )-----------( 280      ( 13 Sep 2019 06:06 )             32.4       Chemistries  09-13    143  |  107  |  30<H>  ----------------------------<  166<H>  4.2   |  29  |  1.60<H>    Ca    7.9<L>      13 Sep 2019 06:06  Phos  3.2     09-12  Mg     1.8     09-12    TPro  6.3  /  Alb  3.0<L>  /  TBili  0.5  /  DBili  x   /  AST  97<H>  /  ALT  59  /  AlkPhos  125<H>  09-13    Urinalysis (09.12.19 @ 15:36)    Glucose Qualitative, Urine: Negative    Blood, Urine: Large    pH Urine: 5.0    Color: Yellow    Urine Appearance: Slightly Turbid    Bilirubin: Negative    Ketone - Urine: Negative    Specific Gravity: 1.015    Protein, Urine: 25 mg/dL    Urobilinogen: Negative    Nitrite: Negative    Leukocyte Esterase Concentration: Trace  Urine Microscopic-Add On (NC) (09.12.19 @ 15:36)    Uric Acid Crystals: Moderate    Red Blood Cell - Urine: >50 /HPF    White Blood Cell - Urine: 3-5    Bacteria: Few    Epithelial Cells: Few    < from: CT Abdomen and Pelvis w/ IV Cont (09.12.19 @ 14:28) >    EXAM:  CT ABDOMEN AND PELVIS IC                            PROCEDURE DATE:  09/12/2019          INTERPRETATION:  CLINICAL INFORMATION: Right lower quadrant pain    COMPARISON: None.    PROCEDURE:   CT of the Abdomen and Pelvis was performed with intravenous contrast.   Intravenous contrast: 90 ml Omnipaque 350. 10 ml discarded.  Oral contrast: None.  Sagittal and coronal reformats were performed.    FINDINGS:    LOWER CHEST: Within normal limits.    LIVER: Marked steatosis  BILE DUCTS: Normal caliber.  GALLBLADDER: Within normal limits.  SPLEEN: Within normal limits.  PANCREAS: Within normal limits.  ADRENALS: Within normal limits.  KIDNEYS/URETERS: Mild right hydroureteronephrosis secondary to a 1 mm   calculus in the proximal right ureter. Right perirenal stranding.   Correlate clinically for concomitant infection..    BLADDER: Within normal limits.  REPRODUCTIVE ORGANS: Hysterectomy.    BOWEL: No bowel obstruction or inflammation. Appendix normal  PERITONEUM: No ascites.  VESSELS: Nonaneurysmal  RETROPERITONEUM/LYMPH NODES: No lymphadenopathy.    ABDOMINAL WALL: Within normal limits.  BONES: No acute or aggressive pathology    IMPRESSION:     Mild right hydronephrosis secondary to a very small (1 mm) calculus in   the proximal right ureter.    No evidence of appendicitis    JOSE GUADALUPE ALEXANDRA M.D., ATTENDING RADIOLOGIST  This document has been electronically signed. Sep 12 2019  2:47PM    < end of copied text >      Culture Data  None as of yet  No blood cx obtained        Assessment--      Suggestions--        Royce Moss MD  [phone]

## 2019-09-24 ENCOUNTER — APPOINTMENT (OUTPATIENT)
Dept: DERMATOLOGY | Facility: CLINIC | Age: 84
End: 2019-09-24
Payer: MEDICARE

## 2019-09-24 ENCOUNTER — LABORATORY RESULT (OUTPATIENT)
Age: 84
End: 2019-09-24

## 2019-09-24 VITALS — BODY MASS INDEX: 24.02 KG/M2 | WEIGHT: 123 LBS

## 2019-09-24 DIAGNOSIS — L57.0 ACTINIC KERATOSIS: ICD-10-CM

## 2019-09-24 PROCEDURE — 11102 TANGNTL BX SKIN SINGLE LES: CPT

## 2019-09-24 PROCEDURE — 99203 OFFICE O/P NEW LOW 30 MIN: CPT | Mod: 25

## 2019-09-27 ENCOUNTER — RX RENEWAL (OUTPATIENT)
Age: 84
End: 2019-09-27

## 2019-10-08 ENCOUNTER — APPOINTMENT (OUTPATIENT)
Dept: DERMATOLOGY | Facility: CLINIC | Age: 84
End: 2019-10-08
Payer: MEDICARE

## 2019-10-08 PROCEDURE — 99214 OFFICE O/P EST MOD 30 MIN: CPT

## 2019-10-11 ENCOUNTER — TRANSCRIPTION ENCOUNTER (OUTPATIENT)
Age: 84
End: 2019-10-11

## 2019-10-14 ENCOUNTER — RX RENEWAL (OUTPATIENT)
Age: 84
End: 2019-10-14

## 2019-10-16 ENCOUNTER — APPOINTMENT (OUTPATIENT)
Dept: DERMATOLOGY | Facility: CLINIC | Age: 84
End: 2019-10-16
Payer: MEDICARE

## 2019-10-16 PROCEDURE — 17315 MOHS SURG ADDL BLOCK: CPT | Mod: GC

## 2019-10-16 PROCEDURE — 14302 TIS TRNFR ADDL 30 SQ CM: CPT | Mod: GC

## 2019-10-16 PROCEDURE — 17311 MOHS 1 STAGE H/N/HF/G: CPT | Mod: GC

## 2019-10-16 PROCEDURE — 14301 TIS TRNFR ANY 30.1-60 SQ CM: CPT | Mod: GC

## 2019-10-18 ENCOUNTER — APPOINTMENT (OUTPATIENT)
Dept: DERMATOLOGY | Facility: CLINIC | Age: 84
End: 2019-10-18
Payer: MEDICARE

## 2019-10-18 PROCEDURE — 99024 POSTOP FOLLOW-UP VISIT: CPT

## 2019-10-21 NOTE — HISTORY OF PRESENT ILLNESS
[FreeTextEntry1] : Bandage [de-identified] : 89 y/o female arrived for bandage change on the right cheek.

## 2019-10-29 ENCOUNTER — APPOINTMENT (OUTPATIENT)
Dept: GERIATRICS | Facility: CLINIC | Age: 84
End: 2019-10-29
Payer: MEDICARE

## 2019-10-29 DIAGNOSIS — Z23 ENCOUNTER FOR IMMUNIZATION: ICD-10-CM

## 2019-10-29 PROCEDURE — G0008: CPT

## 2019-10-29 PROCEDURE — 90662 IIV NO PRSV INCREASED AG IM: CPT

## 2019-10-30 ENCOUNTER — OTHER (OUTPATIENT)
Age: 84
End: 2019-10-30

## 2019-12-30 ENCOUNTER — RX RENEWAL (OUTPATIENT)
Age: 84
End: 2019-12-30

## 2019-12-30 RX ORDER — SUCRALFATE 1 G/10ML
1 SUSPENSION ORAL 4 TIMES DAILY
Qty: 1260 | Refills: 1 | Status: ACTIVE | COMMUNITY
Start: 2019-05-23 | End: 1900-01-01

## 2019-12-31 ENCOUNTER — APPOINTMENT (OUTPATIENT)
Dept: DERMATOLOGY | Facility: CLINIC | Age: 84
End: 2019-12-31
Payer: MEDICARE

## 2019-12-31 PROCEDURE — 99024 POSTOP FOLLOW-UP VISIT: CPT

## 2020-03-18 NOTE — ED PROVIDER NOTE - OBJECTIVE STATEMENT
Patient would like a refill on her vicoden.
Attending Odessa Baker: 89 y/o female from home h/o CHF, not on anticoagulation presenting after fall. pt reportedly hit her head yesterday, bumped on a tv. today slid and hit her right hip. since then complaining of worsneing pain. unable to walk. no numbness or tingling. no sob or difficulty breathing. no prior orthopedic injuries

## 2020-05-07 ENCOUNTER — APPOINTMENT (OUTPATIENT)
Dept: GERIATRICS | Facility: CLINIC | Age: 85
End: 2020-05-07
Payer: MEDICARE

## 2020-05-07 DIAGNOSIS — J45.20 MILD INTERMITTENT ASTHMA, UNCOMPLICATED: ICD-10-CM

## 2020-05-07 DIAGNOSIS — R41.3 OTHER AMNESIA: ICD-10-CM

## 2020-05-07 DIAGNOSIS — Z71.89 OTHER SPECIFIED COUNSELING: ICD-10-CM

## 2020-05-07 DIAGNOSIS — M48.00 SPINAL STENOSIS, SITE UNSPECIFIED: ICD-10-CM

## 2020-05-07 DIAGNOSIS — D64.9 ANEMIA, UNSPECIFIED: ICD-10-CM

## 2020-05-07 DIAGNOSIS — I50.9 HEART FAILURE, UNSPECIFIED: ICD-10-CM

## 2020-05-07 DIAGNOSIS — I25.10 ATHEROSCLEROTIC HEART DISEASE OF NATIVE CORONARY ARTERY W/OUT ANGINA PECTORIS: ICD-10-CM

## 2020-05-07 PROCEDURE — 99214 OFFICE O/P EST MOD 30 MIN: CPT | Mod: 95

## 2020-05-07 RX ORDER — FUROSEMIDE 20 MG/1
20 TABLET ORAL
Qty: 30 | Refills: 5 | Status: ACTIVE | COMMUNITY
Start: 2019-05-23 | End: 1900-01-01

## 2020-05-07 RX ORDER — OXYCODONE 5 MG/1
5 TABLET ORAL
Refills: 0 | Status: DISCONTINUED | COMMUNITY
Start: 2019-08-08 | End: 2020-05-07

## 2020-05-07 RX ORDER — ACETAMINOPHEN EXTRA STRENGTH 500 MG/1
500 TABLET ORAL
Refills: 0 | Status: ACTIVE | COMMUNITY
Start: 2020-05-07

## 2020-05-07 RX ORDER — POTASSIUM CHLORIDE 1500 MG/1
20 TABLET, EXTENDED RELEASE ORAL
Qty: 90 | Refills: 1 | Status: ACTIVE | COMMUNITY
Start: 2019-05-23 | End: 1900-01-01

## 2020-05-07 RX ORDER — ATORVASTATIN CALCIUM 40 MG/1
40 TABLET, FILM COATED ORAL DAILY
Qty: 90 | Refills: 1 | Status: ACTIVE | COMMUNITY
Start: 2019-05-23 | End: 1900-01-01

## 2020-05-07 RX ORDER — ALBUTEROL SULFATE 90 UG/1
108 (90 BASE) AEROSOL, METERED RESPIRATORY (INHALATION) EVERY 4 HOURS
Qty: 1 | Refills: 3 | Status: ACTIVE | COMMUNITY
Start: 2019-05-23 | End: 1900-01-01

## 2020-05-07 NOTE — HISTORY OF PRESENT ILLNESS
[Home] : at home, [unfilled] , at the time of the visit. [Medical Office: ([facility])___] : at the medical office located in  [Family Member] : family member [FreeTextEntry2] : Evy  [FreeTextEntry3] : Castillo [FreeTextEntry1] : 89yoF with pmhx of CAD s/p CABG, cervical spinal stenosis, HLD, HFpEF , Asthma, R hip FXR June 2019 s/p IMN, developed suspected GIB while on post op xarelto, no longer on asa or AC,  and dementia who is seen via telehealth for follow up on anemia.\par \par dtr Evy providing history today due to memory loss.\par \par rarely needing inhaler, breathing has been stable.\par lower ext edema minimal, although they have not been weighing her at home.  Her breathing is unlabored and at her baseline.\par denies new cough, sob, chest pain, fever, chills, ab pain, BM problems.\par \par notes that MOHs from face is healing well.\par \par Sternotomy wires occasionally bother patient, once there was some bleeding, but not recently.  occasionally use aspercreme topically which helps.\par \par wears soft c collar to spinal stenosis, pain is managed with prn tylenol.  no longer taking oxycodone.\par \par ambulation is stable.  no recent falls\par eating stable.\par has HHA 10 hrs daily\par \par anemia: denies any recurrent bleeding.  \par medications reconciled.\par \par denies any recent bloodwork.\par \par \par

## 2020-05-07 NOTE — SOCIAL HISTORY
[Two or more falls in past year] : Patient reported two or more falls in the past year [Some assistance needed] : managing medications [Full assistance needed] : using transportation

## 2020-05-07 NOTE — REVIEW OF SYSTEMS
[Loss Of Hearing] : hearing loss [As Noted in HPI] : as noted in HPI [Negative] : Eyes [FreeTextEntry8] : urinary frequency with lasix [Lower Ext Edema] : no lower extremity edema

## 2020-05-07 NOTE — ASSESSMENT
[FreeTextEntry1] : anemia:  no longer on aspirin or xarelto without any recurrent bleeding from suspected ugi bleed with hx of peptic ulcer.  c/w carafate, f/u labwork.  previous hgb ~9\par chf:  HFpEF on echo from 12/2019. euvolemic.  c/w lasix 20mg daily and kcl.  f/u labwork\par cad s/p cabg: no longer on asa due to gi bleed.  c/w lipitor, pt tolerating.  discussed continued use of aspercreme for sternotomy wires.  advised to notify MD if there is skin breakage.  goal is for conservative management\par asthma: mild intermittent: refill of inhaler provided\par spinal stenosis: discussed prn tylenol and avoidance of NSAIDs given hx of peptic ulcers and GIB\par memory loss: c/w supportive care and assistance with HHA \par ACP:  previously has discussed MOLST, pt is DNR/DNI, molst not currently scanned in as dtr was to bring to office. but they have copy at home.\par \par

## 2020-05-07 NOTE — PHYSICAL EXAM
[General Appearance - Alert] : alert [General Appearance - In No Acute Distress] : in no acute distress [Sclera] : the sclera and conjunctiva were normal [Respiration, Rhythm And Depth] : normal respiratory rhythm and effort [Exaggerated Use Of Accessory Muscles For Inspiration] : no accessory muscle use [] : no respiratory distress [Affect] : the affect was normal [Skin Color & Pigmentation] : normal skin color and pigmentation [Involuntary Movements] : no involuntary movements were seen [Mood] : the mood was normal [FreeTextEntry1] : minimal edema of b/l ankles

## 2020-05-12 ENCOUNTER — LABORATORY RESULT (OUTPATIENT)
Age: 85
End: 2020-05-12

## 2020-05-13 LAB
ALBUMIN SERPL ELPH-MCNC: 3.6 G/DL
ALP BLD-CCNC: 85 U/L
ALT SERPL-CCNC: 10 U/L
ANION GAP SERPL CALC-SCNC: 10 MMOL/L
AST SERPL-CCNC: 18 U/L
BASOPHILS # BLD AUTO: 0.08 K/UL
BASOPHILS NFR BLD AUTO: 1.1 %
BILIRUB SERPL-MCNC: 0.2 MG/DL
BUN SERPL-MCNC: 33 MG/DL
CALCIUM SERPL-MCNC: 9.1 MG/DL
CHLORIDE SERPL-SCNC: 107 MMOL/L
CO2 SERPL-SCNC: 22 MMOL/L
CREAT SERPL-MCNC: 0.87 MG/DL
EOSINOPHIL # BLD AUTO: 0.17 K/UL
EOSINOPHIL NFR BLD AUTO: 2.3 %
FERRITIN SERPL-MCNC: 24 NG/ML
GLUCOSE SERPL-MCNC: 203 MG/DL
HCT VFR BLD CALC: 30.4 %
HGB BLD-MCNC: 9.3 G/DL
IMM GRANULOCYTES NFR BLD AUTO: 0.6 %
IRON SATN MFR SERPL: NORMAL %
IRON SERPL-MCNC: 247 UG/DL
LYMPHOCYTES # BLD AUTO: 0.9 K/UL
LYMPHOCYTES NFR BLD AUTO: 12.4 %
MAN DIFF?: NORMAL
MCHC RBC-ENTMCNC: 28.4 PG
MCHC RBC-ENTMCNC: 30.6 GM/DL
MCV RBC AUTO: 92.7 FL
MONOCYTES # BLD AUTO: 0.76 K/UL
MONOCYTES NFR BLD AUTO: 10.5 %
NEUTROPHILS # BLD AUTO: 5.32 K/UL
NEUTROPHILS NFR BLD AUTO: 73.1 %
PLATELET # BLD AUTO: 293 K/UL
POTASSIUM SERPL-SCNC: 4.7 MMOL/L
PROT SERPL-MCNC: 5.6 G/DL
RBC # BLD: 3.28 M/UL
RBC # FLD: 21.1 %
SODIUM SERPL-SCNC: 139 MMOL/L
TIBC SERPL-MCNC: NORMAL UG/DL
UIBC SERPL-MCNC: <20 UG/DL
WBC # FLD AUTO: 7.27 K/UL

## 2020-05-28 ENCOUNTER — INPATIENT (INPATIENT)
Facility: HOSPITAL | Age: 85
LOS: 3 days | Discharge: HOME HEALTH | DRG: 690 | End: 2020-06-01
Attending: STUDENT IN AN ORGANIZED HEALTH CARE EDUCATION/TRAINING PROGRAM | Admitting: HOSPITALIST
Payer: MEDICARE

## 2020-05-28 VITALS
TEMPERATURE: 98 F | OXYGEN SATURATION: 97 % | WEIGHT: 119.93 LBS | HEART RATE: 75 BPM | SYSTOLIC BLOOD PRESSURE: 140 MMHG | RESPIRATION RATE: 16 BRPM | DIASTOLIC BLOOD PRESSURE: 56 MMHG

## 2020-05-28 DIAGNOSIS — I50.30 UNSPECIFIED DIASTOLIC (CONGESTIVE) HEART FAILURE: ICD-10-CM

## 2020-05-28 DIAGNOSIS — Z29.9 ENCOUNTER FOR PROPHYLACTIC MEASURES, UNSPECIFIED: ICD-10-CM

## 2020-05-28 DIAGNOSIS — M48.00 SPINAL STENOSIS, SITE UNSPECIFIED: ICD-10-CM

## 2020-05-28 DIAGNOSIS — N39.0 URINARY TRACT INFECTION, SITE NOT SPECIFIED: ICD-10-CM

## 2020-05-28 DIAGNOSIS — I25.10 ATHEROSCLEROTIC HEART DISEASE OF NATIVE CORONARY ARTERY WITHOUT ANGINA PECTORIS: ICD-10-CM
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
## 2020-05-28 DIAGNOSIS — K27.9 PEPTIC ULCER, SITE UNSPECIFIED, UNSPECIFIED AS ACUTE OR CHRONIC, WITHOUT HEMORRHAGE OR PERFORATION: ICD-10-CM

## 2020-05-28 DIAGNOSIS — Z98.890 OTHER SPECIFIED POSTPROCEDURAL STATES: Chronic | ICD-10-CM

## 2020-05-28 LAB
ALBUMIN SERPL ELPH-MCNC: 3.5 G/DL — SIGNIFICANT CHANGE UP (ref 3.3–5)
ALP SERPL-CCNC: 78 U/L — SIGNIFICANT CHANGE UP (ref 40–120)
ALT FLD-CCNC: 9 U/L — LOW (ref 10–45)
ANION GAP SERPL CALC-SCNC: 10 MMOL/L — SIGNIFICANT CHANGE UP (ref 5–17)
APPEARANCE UR: ABNORMAL
AST SERPL-CCNC: 20 U/L — SIGNIFICANT CHANGE UP (ref 10–40)
BACTERIA # UR AUTO: ABNORMAL
BASOPHILS # BLD AUTO: 0.06 K/UL — SIGNIFICANT CHANGE UP (ref 0–0.2)
BASOPHILS NFR BLD AUTO: 0.8 % — SIGNIFICANT CHANGE UP (ref 0–2)
BILIRUB SERPL-MCNC: 0.2 MG/DL — SIGNIFICANT CHANGE UP (ref 0.2–1.2)
BILIRUB UR-MCNC: NEGATIVE — SIGNIFICANT CHANGE UP
BUN SERPL-MCNC: 35 MG/DL — HIGH (ref 7–23)
CALCIUM SERPL-MCNC: 8.7 MG/DL — SIGNIFICANT CHANGE UP (ref 8.4–10.5)
CHLORIDE SERPL-SCNC: 108 MMOL/L — SIGNIFICANT CHANGE UP (ref 96–108)
CK SERPL-CCNC: 74 U/L — SIGNIFICANT CHANGE UP (ref 25–170)
CO2 SERPL-SCNC: 23 MMOL/L — SIGNIFICANT CHANGE UP (ref 22–31)
COLOR SPEC: YELLOW — SIGNIFICANT CHANGE UP
CREAT SERPL-MCNC: 0.87 MG/DL — SIGNIFICANT CHANGE UP (ref 0.5–1.3)
DIFF PNL FLD: ABNORMAL
EOSINOPHIL # BLD AUTO: 0.11 K/UL — SIGNIFICANT CHANGE UP (ref 0–0.5)
EOSINOPHIL NFR BLD AUTO: 1.5 % — SIGNIFICANT CHANGE UP (ref 0–6)
EPI CELLS # UR: 0 /HPF — SIGNIFICANT CHANGE UP
GLUCOSE SERPL-MCNC: 94 MG/DL — SIGNIFICANT CHANGE UP (ref 70–99)
GLUCOSE UR QL: NEGATIVE — SIGNIFICANT CHANGE UP
HCT VFR BLD CALC: 30.6 % — LOW (ref 34.5–45)
HGB BLD-MCNC: 9.2 G/DL — LOW (ref 11.5–15.5)
HYALINE CASTS # UR AUTO: 0 /LPF — SIGNIFICANT CHANGE UP (ref 0–2)
IMM GRANULOCYTES NFR BLD AUTO: 0.3 % — SIGNIFICANT CHANGE UP (ref 0–1.5)
KETONES UR-MCNC: NEGATIVE — SIGNIFICANT CHANGE UP
LEUKOCYTE ESTERASE UR-ACNC: ABNORMAL
LYMPHOCYTES # BLD AUTO: 0.78 K/UL — LOW (ref 1–3.3)
LYMPHOCYTES # BLD AUTO: 10.9 % — LOW (ref 13–44)
MCHC RBC-ENTMCNC: 28.8 PG — SIGNIFICANT CHANGE UP (ref 27–34)
MCHC RBC-ENTMCNC: 30.1 GM/DL — LOW (ref 32–36)
MCV RBC AUTO: 95.9 FL — SIGNIFICANT CHANGE UP (ref 80–100)
MONOCYTES # BLD AUTO: 0.85 K/UL — SIGNIFICANT CHANGE UP (ref 0–0.9)
MONOCYTES NFR BLD AUTO: 11.9 % — SIGNIFICANT CHANGE UP (ref 2–14)
NEUTROPHILS # BLD AUTO: 5.34 K/UL — SIGNIFICANT CHANGE UP (ref 1.8–7.4)
NEUTROPHILS NFR BLD AUTO: 74.6 % — SIGNIFICANT CHANGE UP (ref 43–77)
NITRITE UR-MCNC: POSITIVE
NRBC # BLD: 0 /100 WBCS — SIGNIFICANT CHANGE UP (ref 0–0)
PH UR: 6 — SIGNIFICANT CHANGE UP (ref 5–8)
PLATELET # BLD AUTO: 264 K/UL — SIGNIFICANT CHANGE UP (ref 150–400)
POTASSIUM SERPL-MCNC: 4.4 MMOL/L — SIGNIFICANT CHANGE UP (ref 3.5–5.3)
POTASSIUM SERPL-SCNC: 4.4 MMOL/L — SIGNIFICANT CHANGE UP (ref 3.5–5.3)
PROT SERPL-MCNC: 5.7 G/DL — LOW (ref 6–8.3)
PROT UR-MCNC: ABNORMAL
RBC # BLD: 3.19 M/UL — LOW (ref 3.8–5.2)
RBC # FLD: 19.6 % — HIGH (ref 10.3–14.5)
RBC CASTS # UR COMP ASSIST: 3 /HPF — SIGNIFICANT CHANGE UP (ref 0–4)
SARS-COV-2 RNA SPEC QL NAA+PROBE: SIGNIFICANT CHANGE UP
SODIUM SERPL-SCNC: 141 MMOL/L — SIGNIFICANT CHANGE UP (ref 135–145)
SP GR SPEC: 1.02 — SIGNIFICANT CHANGE UP (ref 1.01–1.02)
UROBILINOGEN FLD QL: NEGATIVE — SIGNIFICANT CHANGE UP
WBC # BLD: 7.16 K/UL — SIGNIFICANT CHANGE UP (ref 3.8–10.5)
WBC # FLD AUTO: 7.16 K/UL — SIGNIFICANT CHANGE UP (ref 3.8–10.5)
WBC UR QL: 734 /HPF — HIGH (ref 0–5)

## 2020-05-28 PROCEDURE — 99223 1ST HOSP IP/OBS HIGH 75: CPT | Mod: GC

## 2020-05-28 PROCEDURE — 73502 X-RAY EXAM HIP UNI 2-3 VIEWS: CPT | Mod: 26,RT

## 2020-05-28 PROCEDURE — 99285 EMERGENCY DEPT VISIT HI MDM: CPT

## 2020-05-28 PROCEDURE — 73552 X-RAY EXAM OF FEMUR 2/>: CPT | Mod: 26,RT

## 2020-05-28 PROCEDURE — 93010 ELECTROCARDIOGRAM REPORT: CPT

## 2020-05-28 PROCEDURE — 70450 CT HEAD/BRAIN W/O DYE: CPT | Mod: 26

## 2020-05-28 PROCEDURE — 72125 CT NECK SPINE W/O DYE: CPT | Mod: 26

## 2020-05-28 PROCEDURE — 71045 X-RAY EXAM CHEST 1 VIEW: CPT | Mod: 26

## 2020-05-28 RX ORDER — FERROUS SULFATE 325(65) MG
325 TABLET ORAL DAILY
Refills: 0 | Status: DISCONTINUED | OUTPATIENT
Start: 2020-05-28 | End: 2020-06-01

## 2020-05-28 RX ORDER — SUCRALFATE 1 G
1 TABLET ORAL
Refills: 0 | Status: DISCONTINUED | OUTPATIENT
Start: 2020-05-28 | End: 2020-05-29

## 2020-05-28 RX ORDER — ENOXAPARIN SODIUM 100 MG/ML
40 INJECTION SUBCUTANEOUS DAILY
Refills: 0 | Status: DISCONTINUED | OUTPATIENT
Start: 2020-05-28 | End: 2020-06-01

## 2020-05-28 RX ORDER — ALBUTEROL 90 UG/1
2.5 AEROSOL, METERED ORAL EVERY 6 HOURS
Refills: 0 | Status: DISCONTINUED | OUTPATIENT
Start: 2020-05-28 | End: 2020-05-28

## 2020-05-28 RX ORDER — FUROSEMIDE 40 MG
20 TABLET ORAL DAILY
Refills: 0 | Status: DISCONTINUED | OUTPATIENT
Start: 2020-05-28 | End: 2020-06-01

## 2020-05-28 RX ORDER — CEFTRIAXONE 500 MG/1
1000 INJECTION, POWDER, FOR SOLUTION INTRAMUSCULAR; INTRAVENOUS EVERY 24 HOURS
Refills: 0 | Status: DISCONTINUED | OUTPATIENT
Start: 2020-05-29 | End: 2020-06-01

## 2020-05-28 RX ORDER — ACETAMINOPHEN 500 MG
650 TABLET ORAL ONCE
Refills: 0 | Status: COMPLETED | OUTPATIENT
Start: 2020-05-28 | End: 2020-05-28

## 2020-05-28 RX ORDER — CEFTRIAXONE 500 MG/1
1000 INJECTION, POWDER, FOR SOLUTION INTRAMUSCULAR; INTRAVENOUS ONCE
Refills: 0 | Status: COMPLETED | OUTPATIENT
Start: 2020-05-28 | End: 2020-05-28

## 2020-05-28 RX ORDER — ATORVASTATIN CALCIUM 80 MG/1
40 TABLET, FILM COATED ORAL AT BEDTIME
Refills: 0 | Status: DISCONTINUED | OUTPATIENT
Start: 2020-05-28 | End: 2020-06-01

## 2020-05-28 RX ORDER — ALBUTEROL 90 UG/1
2.5 AEROSOL, METERED ORAL EVERY 6 HOURS
Refills: 0 | Status: DISCONTINUED | OUTPATIENT
Start: 2020-05-28 | End: 2020-06-01

## 2020-05-28 RX ADMIN — ATORVASTATIN CALCIUM 40 MILLIGRAM(S): 80 TABLET, FILM COATED ORAL at 22:21

## 2020-05-28 RX ADMIN — Medication 20 MILLIGRAM(S): at 18:24

## 2020-05-28 RX ADMIN — Medication 1 GRAM(S): at 18:24

## 2020-05-28 RX ADMIN — Medication 650 MILLIGRAM(S): at 10:54

## 2020-05-28 RX ADMIN — Medication 325 MILLIGRAM(S): at 18:24

## 2020-05-28 RX ADMIN — CEFTRIAXONE 100 MILLIGRAM(S): 500 INJECTION, POWDER, FOR SOLUTION INTRAMUSCULAR; INTRAVENOUS at 12:22

## 2020-05-28 RX ADMIN — ENOXAPARIN SODIUM 40 MILLIGRAM(S): 100 INJECTION SUBCUTANEOUS at 18:24

## 2020-05-28 NOTE — H&P ADULT - PROBLEM SELECTOR PLAN 3
hx of CAD not on ASA due to hx of GIB  -no cp/sob, ck wnl, ekg not acutely ischemic, euvolemic aside from some LE dependent edema  -c/w home po lasix 20mg daily  -holding home potassium, trend bmp and treat as needed

## 2020-05-28 NOTE — H&P ADULT - NSHPLABSRESULTS_GEN_ALL_CORE
personally reviewed labs:                          9.2    7.16  )-----------( 264      ( 28 May 2020 09:29 )             30.6           141  |  108  |  35<H>  ----------------------------<  94  4.4   |  23  |  0.87    Ca    8.7      28 May 2020 09:29    TPro  5.7<L>  /  Alb  3.5  /  TBili  0.2  /  DBili  x   /  AST  20  /  ALT  9<L>  /  AlkPhos  78      Urinalysis Basic - ( 28 May 2020 11:06 )    Color: Yellow / Appearance: Slightly Turbid / S.019 / pH: x  Gluc: x / Ketone: Negative  / Bili: Negative / Urobili: Negative   Blood: x / Protein: 30 mg/dL / Nitrite: Positive   Leuk Esterase: Large / RBC: 3 /hpf /  /HPF   Sq Epi: x / Non Sq Epi: 0 /hpf / Bacteria: Many        CARDIAC MARKERS ( 28 May 2020 09:29 )  x     / x     / 74 U/L / x     / x          CAPILLARY BLOOD GLUCOSE      personally reviewed ct head/chest:  IMPRESSION:     CT BRAIN:  No acute intracranial hemorrhage or mass effect. No displaced calvarial fracture.  Chronic right thalamic infarct.    CT CERVICAL SPINE:  No acute fracture or traumatic subluxation.  No prevertebral soft tissue swelling.  Degenerative changes  Similar appearing fibrotic changes at the lung apices.    xrays of R femur and hip: unofficially prelim no fracture seen but f/u final radiology report      personally reviewed EKG: NSR 69, old LABF and RBBB

## 2020-05-28 NOTE — ED PROVIDER NOTE - CLINICAL SUMMARY MEDICAL DECISION MAKING FREE TEXT BOX
89yo woman not on a/c presents after falling and complains of right hip pain after being on the ground x 1.5 hours but due to poor history of fall, will obtain CTH, cspine to evaluate for intracranial bleed. Will obtain imaging of right hip, pelvis, chest. Will check cbc, cmp, UA, CK. Will give pain medication and reassess. Will require evaluation by PT to evaluate need for inpatient vs outpt therapy.

## 2020-05-28 NOTE — ED PROVIDER NOTE - OBJECTIVE STATEMENT
89yo woman PMH CAD s/p CABG not on a/c due to GIB after IMN of right hip fracture, spinal stenosis with soft collar, HLD presents after a fall and complains of right hip pain. Pt states she was trying to get out of bed when she fell to the ground. She does not remember how she fell but she denies any LOC, head injury, headache. She has chronic neck pain 2/2 stenosis and wears a soft cervical collar. She states she was on the ground on her right side and did not have help for about 1.5 hours. She denies any recent fevers, dysuria, chest pain, SOB, cough, n/v/d. She states she has chronic abdominal pain (hx of PUD on carafate) but nothing new recently.  Spoke with pt's daughter (Evy Ayala [phone]) who is pt's health care proxy who states that HHA couldn't get into the house today and when she went to let her in, found pt on the floor trying to get to the living room phone. they did not try to get her up because unsure if she had broken any bones. Pt normally walks with a walker and has a HHA 8:30am-6:30pm and lives alone.  meds: lasix 40mg, lipitor, carafate, iron, potassium 20meq, tylenol 89yo woman PMH CAD s/p CABG not on a/c due to GIB after IMN of right hip fracture, spinal stenosis with soft collar, HLD presents after a fall and complains of right hip pain. Pt states she was trying to get out of bed when she fell to the ground. She does not remember how she fell but she denies any LOC, head injury, headache. She has chronic neck pain 2/2 stenosis and wears a soft cervical collar. She states she was on the ground on her right side and did not have help for about 1.5 hours. She denies any recent fevers, dysuria, chest pain, SOB, cough, n/v/d. She states she has chronic abdominal pain (hx of PUD on carafate) but nothing new recently.    Spoke with pt's daughter (Evy Ayala [phone]) who is pt's health care proxy who states that HHA couldn't get into the house today and when she went to let her in, found pt on the floor trying to get to the living room phone. they did not try to get her up because unsure if she had broken any bones. Pt normally walks with a walker and has a HHA 8:30am-6:30pm and lives alone.  meds: lasix 40mg, lipitor, carafate, iron, potassium 20meq, tylenol 89yo woman PMH CAD s/p CABG not on a/c due to GIB after IMN of right hip fracture, spinal stenosis with soft collar, HLD presents after a fall and complains of right hip pain. Pt states she was trying to get out of bed when she fell to the ground. She does not remember how she fell but she denies any LOC, head injury, headache. She has chronic neck pain 2/2 stenosis and wears a soft cervical collar. She states she was on the ground on her right side and did not have help for about 1.5 hours. She denies any recent fevers, dysuria, chest pain, SOB, cough, n/v/d. She states she has chronic abdominal pain (hx of PUD on carafate) but nothing new recently.    Spoke with pt's daughter (Evy Ayala [phone]) who is pt's health care proxy who states that HHA couldn't get into the house today and when she went to let her in, found pt on the floor trying to get to the living room phone. they did not try to get her up because unsure if she had broken any bones. Pt normally walks with a walker and has a HHA 8:30am-6:30pm and lives alone.    Pt has a history of delirium and may require sedation for sleep at night.  home meds: lasix 40mg, lipitor, carafate, iron, potassium 20meq, tylenol

## 2020-05-28 NOTE — ED PROVIDER NOTE - ATTENDING CONTRIBUTION TO CARE
89y female PMH CAD,HLD, PUD, Spinal stenosis, Sp orif hip fx, Pt comes to ed complains of fall at home pt was walking without walker and fell to floor, unable to get up. EMS called when pt aide arrived. Pt state she was on the floor for approx 1.5h and had hip pain "from lying there' Now feels much better. PE WDWN female,  looking younger than stated age  normocephalic atraumatic neck supple chest clear anterior & posterior abd soft +bs no mass guarding cv no  rubs, gallops or murmurs neruo gcs 15 speech fluent moves all exyr pain light tough intact hips full rom  Tyrese Bernard MD, Facep

## 2020-05-28 NOTE — H&P ADULT - ASSESSMENT
89 y/o F PMHx R hip fracture s/p IMN on 6/25/19, CAD s/p CABGx3 >20 years ago (not on aspirin due to hx of GIB), HFpEF, HTN, mild intermittent asthma, presents after fall at home complaining of R hip pain and difficulty ambulating also with UTI 89 y/o F PMHx R hip fracture s/p IMN on 6/25/19, CAD s/p CABGx3 >20 years ago (not on aspirin due to hx of GIB), HFpEF, HTN, mild intermittent asthma, presents after fall at home complaining of difficulty ambulating also with UTI

## 2020-05-28 NOTE — ED ADULT NURSE NOTE - OBJECTIVE STATEMENT
Pt is a 89 y/o female brought in by EMS s/p slip and fall out of bed. Pt A&Ox2 to person and place at baseline and currently. Pt states she was attempting to get out of bed this morning when she slipped and landed on floor. Pt explanation unclear if she recalls fall, states she knows she slipped out of bed and knows she was waiting on floor for hour and a half. States her right hip hurts, has hx of right hip fracture. Pt has full ROM of all extremities and strength, no shortening or rotation noted. States she has aide at home and has adequate help at home. Denies hx covid. Denies CP, SOB, N/V/D, numbness, tingling, cough, fever, chills, dizziness, weakness, headache. Pt poor historian. States she has abdominal pain which she always has at baseline. Breathing unlabored and spontaneous. Abdomen soft, nontender, nondistended. Full ROM and strength of extremities. Skin dry and intact. Safety and comfort measures provided, call bell provided and bed in lowest position.

## 2020-05-28 NOTE — H&P ADULT - HISTORY OF PRESENT ILLNESS
89 y/o F PMHx R hip fracture s/p IMN on 6/25/19, CAD s/p CABGx3 >20 years ago (not on aspirin due to hx of GIB), HFpEF, HTN, mild intermittent asthma, presents after fall at home complaining of R hip pain and difficulty ambulating     ED vitals: 97.8, 75, 140/56, 16, 98% on RA  In the ER she got tylenol 650mg and ceftriaxone 1 gram 89 y/o F PMHx R hip fracture s/p IMN on 6/25/19, CAD s/p CABGx3 >20 years ago (not on aspirin due to hx of GIB), HFpEF, HTN, mild intermittent asthma, presents after fall at home complaining of difficulty ambulating. Pt states she was trying to get out of bed and then fell on her R side. She had R hip pain but overall has improved and she is able to move both legs but hard for walk. Denies urinary incontinence, back pain, saddle anesthesia, neck pain. Denies dysuria, n/v, f/c, chest pain, sob, cough, sick contacts, syncope, LOC    ED vitals: 97.8, 75, 140/56, 16, 98% on RA  In the ER she got tylenol 650mg and ceftriaxone 1 gram

## 2020-05-28 NOTE — ED PROVIDER NOTE - NS ED ROS FT
General: no fever  Head: no headache  Eyes: no vision change, +poor vision in left eye chronically  ENT: no epistaxis, no sore throat  CV: no chest pain  Resp: no SOB, no cough  GI: no N/V, +chronic abdominal pain  : no dysuria  MSK: +right hip pain  Skin: no new rash  Neuro: no focal weakness, no change in sensation

## 2020-05-28 NOTE — ED ADULT NURSE NOTE - NSIMPLEMENTINTERV_GEN_ALL_ED
Implemented All Fall with Harm Risk Interventions:  [place] to call system. Call bell, personal items and telephone within reach. Instruct patient to call for assistance. Room bathroom lighting operational. Non-slip footwear when patient is off stretcher. Physically safe environment: no spills, clutter or unnecessary equipment. Stretcher in lowest position, wheels locked, appropriate side rails in place. Provide visual cue, wrist band, yellow gown, etc. Monitor gait and stability. Monitor for mental status changes and reorient to person, place, and time. Review medications for side effects contributing to fall risk. Reinforce activity limits and safety measures with patient and family. Provide visual clues: red socks.

## 2020-05-28 NOTE — ED PROVIDER NOTE - PHYSICAL EXAMINATION
General: elderly woman in no acute distress  Head: normocephalic, atraumatic  Eyes: PERRL, EOMI  Mouth: mildly dry mucous membranes  Neck: supple neck  CV: normal rate and rhythm, bilateral mild LE edema, peripheral pulses 2+ bilaterally in LE  Respiratory: clear to auscultation bilaterally  Abdomen: soft, nontender, nondistended  : no suprapubic tenderness  MSK: no Cspine tenderness to palpation, no midline spinal tenderness to palpation, full ROM of joints with no tenderness to palpation, no pelvis tenderness or instability  Neuro: alert and oriented x2-3, CN III-XII intact, speech clear, strength 5/5 UE and 4+/5 in LE bilaterally, sensation equal and intact bilaterally  Skin: erythema of bilateral LE with no tenderness to palpation

## 2020-05-28 NOTE — H&P ADULT - NSICDXPASTMEDICALHX_GEN_ALL_CORE_FT
PAST MEDICAL HISTORY:  (HFpEF) heart failure with preserved ejection fraction     CAD (coronary artery disease)     HLD (hyperlipidemia)     Peptic ulcer disease     Spinal stenosis

## 2020-05-28 NOTE — H&P ADULT - PROBLEM SELECTOR PLAN 1
s/p fall at home with resultant R hip pain  prelim unofficial xray of R hip/femur/pelvis without fracture but f/u official read: if fracture consult ortho s/p fall at home with resultant R hip pain  prelim unofficial xray of R hip/femur/pelvis without fracture but f/u official read: if fracture consult ortho  PT eval  pain control with tylenol 650mg q6 prn

## 2020-05-28 NOTE — H&P ADULT - PROBLEM SELECTOR PLAN 4
pain control as above hx of CAD not on ASA due to hx of GIB  -no cp/sob, ck wnl, ekg not acutely ischemic, hx of CAD not on ASA due to hx of GIB  -no cp/sob, ck wnl, ekg not acutely ischemic,  -c/w statin

## 2020-05-29 ENCOUNTER — TRANSCRIPTION ENCOUNTER (OUTPATIENT)
Age: 85
End: 2020-05-29

## 2020-05-29 LAB
ANION GAP SERPL CALC-SCNC: 11 MMOL/L — SIGNIFICANT CHANGE UP (ref 5–17)
BUN SERPL-MCNC: 27 MG/DL — HIGH (ref 7–23)
CALCIUM SERPL-MCNC: 9.3 MG/DL — SIGNIFICANT CHANGE UP (ref 8.4–10.5)
CHLORIDE SERPL-SCNC: 104 MMOL/L — SIGNIFICANT CHANGE UP (ref 96–108)
CO2 SERPL-SCNC: 25 MMOL/L — SIGNIFICANT CHANGE UP (ref 22–31)
CREAT SERPL-MCNC: 0.87 MG/DL — SIGNIFICANT CHANGE UP (ref 0.5–1.3)
GLUCOSE SERPL-MCNC: 98 MG/DL — SIGNIFICANT CHANGE UP (ref 70–99)
HCT VFR BLD CALC: 32.5 % — LOW (ref 34.5–45)
HGB BLD-MCNC: 9.8 G/DL — LOW (ref 11.5–15.5)
MCHC RBC-ENTMCNC: 28.6 PG — SIGNIFICANT CHANGE UP (ref 27–34)
MCHC RBC-ENTMCNC: 30.2 GM/DL — LOW (ref 32–36)
MCV RBC AUTO: 94.8 FL — SIGNIFICANT CHANGE UP (ref 80–100)
NRBC # BLD: 0 /100 WBCS — SIGNIFICANT CHANGE UP (ref 0–0)
PLATELET # BLD AUTO: 259 K/UL — SIGNIFICANT CHANGE UP (ref 150–400)
POTASSIUM SERPL-MCNC: 3.8 MMOL/L — SIGNIFICANT CHANGE UP (ref 3.5–5.3)
POTASSIUM SERPL-SCNC: 3.8 MMOL/L — SIGNIFICANT CHANGE UP (ref 3.5–5.3)
RBC # BLD: 3.43 M/UL — LOW (ref 3.8–5.2)
RBC # FLD: 19.4 % — HIGH (ref 10.3–14.5)
SODIUM SERPL-SCNC: 140 MMOL/L — SIGNIFICANT CHANGE UP (ref 135–145)
WBC # BLD: 7.07 K/UL — SIGNIFICANT CHANGE UP (ref 3.8–10.5)
WBC # FLD AUTO: 7.07 K/UL — SIGNIFICANT CHANGE UP (ref 3.8–10.5)

## 2020-05-29 PROCEDURE — 99232 SBSQ HOSP IP/OBS MODERATE 35: CPT

## 2020-05-29 RX ORDER — SUCRALFATE 1 G
1 TABLET ORAL
Refills: 0 | Status: DISCONTINUED | OUTPATIENT
Start: 2020-05-29 | End: 2020-06-01

## 2020-05-29 RX ORDER — HALOPERIDOL DECANOATE 100 MG/ML
1 INJECTION INTRAMUSCULAR EVERY 6 HOURS
Refills: 0 | Status: DISCONTINUED | OUTPATIENT
Start: 2020-05-29 | End: 2020-05-30

## 2020-05-29 RX ORDER — LANOLIN ALCOHOL/MO/W.PET/CERES
3 CREAM (GRAM) TOPICAL AT BEDTIME
Refills: 0 | Status: DISCONTINUED | OUTPATIENT
Start: 2020-05-29 | End: 2020-06-01

## 2020-05-29 RX ADMIN — Medication 3 MILLIGRAM(S): at 22:06

## 2020-05-29 RX ADMIN — HALOPERIDOL DECANOATE 1 MILLIGRAM(S): 100 INJECTION INTRAMUSCULAR at 23:43

## 2020-05-29 RX ADMIN — Medication 1 GRAM(S): at 18:33

## 2020-05-29 RX ADMIN — CEFTRIAXONE 100 MILLIGRAM(S): 500 INJECTION, POWDER, FOR SOLUTION INTRAMUSCULAR; INTRAVENOUS at 15:16

## 2020-05-29 RX ADMIN — ENOXAPARIN SODIUM 40 MILLIGRAM(S): 100 INJECTION SUBCUTANEOUS at 15:16

## 2020-05-29 RX ADMIN — Medication 1 GRAM(S): at 04:47

## 2020-05-29 RX ADMIN — Medication 20 MILLIGRAM(S): at 04:47

## 2020-05-29 RX ADMIN — ATORVASTATIN CALCIUM 40 MILLIGRAM(S): 80 TABLET, FILM COATED ORAL at 22:06

## 2020-05-29 RX ADMIN — HALOPERIDOL DECANOATE 1 MILLIGRAM(S): 100 INJECTION INTRAMUSCULAR at 18:33

## 2020-05-29 RX ADMIN — Medication 325 MILLIGRAM(S): at 15:16

## 2020-05-29 NOTE — PHYSICAL THERAPY INITIAL EVALUATION ADULT - DISCHARGE DISPOSITION, PT EVAL
DC home with home PT services for general strengthening, to increase endurance, address fall prevention, perform balance training, safety assessment of home environment, and to restore pt's prior level of function, owns rolling walker/shower chair/commode, +HHA 7days/8-6pm, assist from dtr as needed, CM to be notified.

## 2020-05-29 NOTE — PROGRESS NOTE ADULT - ASSESSMENT
89 y/o F PMHx R hip fracture s/p IMN on 6/25/19, CAD s/p CABGx3 >20 years ago (not on aspirin due to hx of GIB), HFpEF, HTN, mild intermittent asthma, presents after fall at home complaining of difficulty ambulating also with UTI

## 2020-05-29 NOTE — PHYSICAL THERAPY INITIAL EVALUATION ADULT - PRECAUTIONS/LIMITATIONS, REHAB EVAL
Pt states she was trying to get out of bed and then fell on her R side. She had R hip pain but overall has improved and she is able to move both legs but hard for walk. Denies urinary incontinence, back pain, saddle anesthesia, neck pain. Denies dysuria, n/v, f/c, chest pain, sob, cough, sick contacts, syncope, LOC. Hosp course: +UTI;  CTH:No acute intracranial hemorrhage or mass effect. No displaced calvarial fracture.Chronic right thalamic infarct. CT CERVICAL SPINE: No acute fracture or traumatic subluxation. No prevertebral soft tissue swelling. Degenerative changes; xray pelvis/femur: (-) fx/dislocation Pt states she was trying to get out of bed and then fell on her R side. She had R hip pain but overall has improved and she is able to move both legs but hard for walk. Denies urinary incontinence, back pain, saddle anesthesia, neck pain. Denies dysuria, n/v, f/c, chest pain, sob, cough, sick contacts, syncope, LOC. Hosp course: +UTI;  CTH:No acute intracranial hemorrhage or mass effect. No displaced calvarial fracture.Chronic right thalamic infarct. CT CERVICAL SPINE: No acute fracture or traumatic subluxation. No prevertebral soft tissue swelling. Degenerative changes; xray pelvis/femur: (-) fx/dislocation/fall precautions

## 2020-05-29 NOTE — PHYSICAL THERAPY INITIAL EVALUATION ADULT - GENERAL OBSERVATIONS, REHAB EVAL
Pt received sitting in chair, +IVL, A&OX4, forgetful at times, slight [place], follows all commands, pleasant and cooperative. +soft BP (use small cuff), no c/o dizziness.

## 2020-05-29 NOTE — PHYSICAL THERAPY INITIAL EVALUATION ADULT - PERTINENT HX OF CURRENT PROBLEM, REHAB EVAL
Pt is a 89 y/o F admitted to [facility] on 5/28/20 PMHx R hip fracture s/p IMN on 6/25/19, CAD s/p CABGx3 >20 years ago (not on aspirin due to hx of GIB), HFpEF, HTN, mild intermittent asthma, presents after fall at home complaining of difficulty ambulating.

## 2020-05-29 NOTE — DISCHARGE NOTE NURSING/CASE MANAGEMENT/SOCIAL WORK - NSDCPNINST_GEN_ALL_CORE
Follow up with your primary doctor.   Daily weight and any swelling or gain weight or shortness or breath to call primary doctor.

## 2020-05-29 NOTE — PHYSICAL THERAPY INITIAL EVALUATION ADULT - ADDITIONAL COMMENTS
pt lives at home alone, +few steps to enter, +first floor setup, amb with walker, owns commode/shower chair/grabbars, HHA 7days/8-6pm, dtr assists on off time

## 2020-05-29 NOTE — DISCHARGE NOTE NURSING/CASE MANAGEMENT/SOCIAL WORK - PATIENT PORTAL LINK FT
You can access the FollowMyHealth Patient Portal offered by [facility] by registering at the following website: [email or web address]. By joining [email or web address]’s FollowMyHealth portal, you will also be able to view your health information using other applications (apps) compatible with our system.

## 2020-05-29 NOTE — PROGRESS NOTE ADULT - PROBLEM SELECTOR PLAN 7
dvt ppx: lovenox 40mg daily  -PT eval then urine cx then can likely DC possibly over weekend or Monday

## 2020-05-29 NOTE — PHARMACOTHERAPY INTERVENTION NOTE - COMMENTS
Patient on sucralfate suspension 1 g twice daily inpatient - recommended increasing to home dose: 1 g four times daily. Per daughter, requests haloperidol as patient received as needed inpatient for agitation. Confirmed that patient received 1 mg IV Push every 6 hours as needed for agitation during previous admission.    Emily Hawley, PharmD  PGY-1 Pharmacy Resident  [phone]

## 2020-05-29 NOTE — PROGRESS NOTE ADULT - SUBJECTIVE AND OBJECTIVE BOX
PROGRESS NOTE:     Patient is a 89y old  Female who presents with a chief complaint of fall (28 May 2020 15:07)      SUBJECTIVE / OVERNIGHT EVENTS: NIRALI, awake and alert asking to get up and walk around.  Not much pain.     ADDITIONAL REVIEW OF SYSTEMS:  No n/v/d  No fevers or chills      MEDICATIONS  (STANDING):  atorvastatin 40 milliGRAM(s) Oral at bedtime  cefTRIAXone   IVPB 1000 milliGRAM(s) IV Intermittent every 24 hours  enoxaparin Injectable 40 milliGRAM(s) SubCutaneous daily  ferrous    sulfate 325 milliGRAM(s) Oral daily  furosemide    Tablet 20 milliGRAM(s) Oral daily  sucralfate suspension 1 Gram(s) Oral two times a day    MEDICATIONS  (PRN):  ALBUTerol    0.083% 2.5 milliGRAM(s) Nebulizer every 6 hours PRN Shortness of Breath and/or Wheezing      CAPILLARY BLOOD GLUCOSE        I&O's Summary    28 May 2020 07:01  -  29 May 2020 07:00  --------------------------------------------------------  IN: 360 mL / OUT: 0 mL / NET: 360 mL        PHYSICAL EXAM:  Vital Signs Last 24 Hrs  T(C): 36.5 (29 May 2020 04:35), Max: 36.9 (28 May 2020 11:45)  T(F): 97.7 (29 May 2020 04:35), Max: 98.4 (28 May 2020 11:45)  HR: 68 (29 May 2020 04:35) (68 - 79)  BP: 159/73 (29 May 2020 04:35) (146/81 - 168/66)  BP(mean): --  RR: 18 (29 May 2020 04:35) (17 - 18)  SpO2: 97% (29 May 2020 04:35) (96% - 98%)    CONSTITUTIONAL: NAD, well-developed, non toxic appearing  RESPIRATORY: Normal respiratory effort; lungs are clear to auscultation bilaterally  CARDIOVASCULAR: Regular rate and rhythm, normal S1 and S2, no murmur/rub/gallop; No lower extremity edema; Peripheral pulses are 2+ bilaterally  ABDOMEN: Nontender to palpation, normoactive bowel sounds, no rebound/guarding; No hepatosplenomegaly  MUSCLOSKELETAL: no clubbing or cyanosis of digits; no joint swelling or tenderness to palpation  PSYCH: A+O to person, place, and time; affect appropriate    LABS:                        9.8    7.07  )-----------( 259      ( 29 May 2020 07:06 )             32.5     05-    140  |  104  |  27<H>  ----------------------------<  98  3.8   |  25  |  0.87    Ca    9.3      29 May 2020 07:06    TPro  5.7<L>  /  Alb  3.5  /  TBili  0.2  /  DBili  x   /  AST  20  /  ALT  9<L>  /  AlkPhos  78  05-28      CARDIAC MARKERS ( 28 May 2020 09:29 )  x     / x     / 74 U/L / x     / x          Urinalysis Basic - ( 28 May 2020 11:06 )    Color: Yellow / Appearance: Slightly Turbid / S.019 / pH: x  Gluc: x / Ketone: Negative  / Bili: Negative / Urobili: Negative   Blood: x / Protein: 30 mg/dL / Nitrite: Positive   Leuk Esterase: Large / RBC: 3 /hpf /  /HPF   Sq Epi: x / Non Sq Epi: 0 /hpf / Bacteria: Many          RADIOLOGY & ADDITIONAL TESTS:  Results Reviewed:   Imaging Personally Reviewed:  Electrocardiogram Personally Reviewed:    COORDINATION OF CARE:  Care Discussed with Consultants/Other Providers [Y/N]:  Prior or Outpatient Records Reviewed [Y/N]:

## 2020-05-30 PROCEDURE — 99232 SBSQ HOSP IP/OBS MODERATE 35: CPT

## 2020-05-30 PROCEDURE — 73030 X-RAY EXAM OF SHOULDER: CPT | Mod: 26,RT

## 2020-05-30 RX ADMIN — Medication 325 MILLIGRAM(S): at 13:43

## 2020-05-30 RX ADMIN — ENOXAPARIN SODIUM 40 MILLIGRAM(S): 100 INJECTION SUBCUTANEOUS at 13:42

## 2020-05-30 RX ADMIN — Medication 1 GRAM(S): at 13:42

## 2020-05-30 RX ADMIN — CEFTRIAXONE 100 MILLIGRAM(S): 500 INJECTION, POWDER, FOR SOLUTION INTRAMUSCULAR; INTRAVENOUS at 17:02

## 2020-05-30 RX ADMIN — Medication 20 MILLIGRAM(S): at 13:43

## 2020-05-30 NOTE — PROGRESS NOTE ADULT - ASSESSMENT
89 y/o F PMHx R hip fracture s/p IMN on 6/25/19, CAD s/p CABGx3 >20 years ago (not on aspirin due to hx of GIB), HFpEF, HTN, mild intermittent asthma, presents after fall at home complaining of difficulty ambulating also with UTI

## 2020-05-30 NOTE — PROGRESS NOTE ADULT - PROBLEM/PLAN-4
DISPLAY PLAN FREE TEXT

## 2020-05-30 NOTE — PROGRESS NOTE ADULT - PROBLEM SELECTOR PLAN 7
dvt ppx: lovenox 40mg daily  -given mental status concerned for safety at home. Will ask CM/SW to ensure safe discharge.

## 2020-05-30 NOTE — PROGRESS NOTE ADULT - SUBJECTIVE AND OBJECTIVE BOX
PROGRESS NOTE:     Patient is a 89y old  Female who presents with a chief complaint of fall (28 May 2020 15:07)      SUBJECTIVE / OVERNIGHT EVENTS:No acute events overnight. Patient confused today. More cantancorous to staff, called physician a "jackass". States unable to move right shoulder. Has right hip pain , but was able to ambulate with PT 40 feet. \    ADDITIONAL REVIEW OF SYSTEMS:  No n/v/d  No fevers or chills      MEDICATIONS  (STANDING):  atorvastatin 40 milliGRAM(s) Oral at bedtime  cefTRIAXone   IVPB 1000 milliGRAM(s) IV Intermittent every 24 hours  enoxaparin Injectable 40 milliGRAM(s) SubCutaneous daily  ferrous    sulfate 325 milliGRAM(s) Oral daily  furosemide    Tablet 20 milliGRAM(s) Oral daily  sucralfate suspension 1 Gram(s) Oral two times a day    MEDICATIONS  (PRN):  ALBUTerol    0.083% 2.5 milliGRAM(s) Nebulizer every 6 hours PRN Shortness of Breath and/or Wheezing      CAPILLARY BLOOD GLUCOSE        I&O's Summary    28 May 2020 07:01  -  29 May 2020 07:00  --------------------------------------------------------  IN: 360 mL / OUT: 0 mL / NET: 360 mL        PHYSICAL EXAM:  Vital Signs Last 24 Hrs  T(C): 36.5 (29 May 2020 04:35), Max: 36.9 (28 May 2020 11:45)  T(F): 97.7 (29 May 2020 04:35), Max: 98.4 (28 May 2020 11:45)  HR: 68 (29 May 2020 04:35) (68 - 79)  BP: 159/73 (29 May 2020 04:35) (146/81 - 168/66)  BP(mean): --  RR: 18 (29 May 2020 04:35) (17 - 18)  SpO2: 97% (29 May 2020 04:35) (96% - 98%)    CONSTITUTIONAL: NAD, well-developed, non toxic appearing  RESPIRATORY: Normal respiratory effort; lungs are clear to auscultation bilaterally  CARDIOVASCULAR: Regular rate and rhythm, normal S1 and S2, no murmur/rub/gallop; No lower extremity edema; Peripheral pulses are 2+ bilaterally  ABDOMEN: Nontender to palpation, normoactive bowel sounds, no rebound/guarding; No hepatosplenomegaly  MUSCLOSKELETAL: pain to palpation of right hsoulder medial joint, unable to actively abduct without pain. +TTP right hip bursa.     PSYCH: AAOx1    LABS:                                   9.8    7.07  )-----------( 259      ( 29 May 2020 07:06 )             32.5   05-29    140  |  104  |  27<H>  ----------------------------<  98  3.8   |  25  |  0.87    Ca    9.3      29 May 2020 07:06          RADIOLOGY & ADDITIONAL TESTS:  Results Reviewed:   Imaging Personally Reviewed:  < from: Xray Hip w/ Pelvis 2 or 3 Views, Right (05.28.20 @ 10:05) >  IMPRESSION:   Diffuse osseous demineralization limits evaluation. Evaluation of the sacrum is limited by overlying bowel. Redemonstrated postsurgical changes status post fixation of proximal right femur fracture with intramedullary giulia and proximal and distal screws in place. No acute displaced fracture or dislocation is seen in the right hip/right femur. Arthritic changes of the right knee, evaluation limited. No acute displaced fracture is seen in the bony pelvis. Degenerative changes in the lumbar spine are partially imaged. Pubic symphysis arthrosis. Mild arthritic changes of bilateral hips. Vascular calcifications.          < end of copied text >    Electrocardiogram Personally Reviewed:    COORDINATION OF CARE:  Care Discussed with Consultants/Other Providers [Y/N]:  Prior or Outpatient Records Reviewed [Y/N]:

## 2020-05-31 ENCOUNTER — TRANSCRIPTION ENCOUNTER (OUTPATIENT)
Age: 85
End: 2020-05-31

## 2020-05-31 PROCEDURE — 99232 SBSQ HOSP IP/OBS MODERATE 35: CPT

## 2020-05-31 RX ORDER — ACETAMINOPHEN 500 MG
650 TABLET ORAL EVERY 6 HOURS
Refills: 0 | Status: DISCONTINUED | OUTPATIENT
Start: 2020-05-31 | End: 2020-06-01

## 2020-05-31 RX ORDER — SIMETHICONE 80 MG/1
80 TABLET, CHEWABLE ORAL ONCE
Refills: 0 | Status: DISCONTINUED | OUTPATIENT
Start: 2020-05-31 | End: 2020-05-31

## 2020-05-31 RX ADMIN — Medication 3 MILLIGRAM(S): at 00:31

## 2020-05-31 RX ADMIN — ATORVASTATIN CALCIUM 40 MILLIGRAM(S): 80 TABLET, FILM COATED ORAL at 21:58

## 2020-05-31 RX ADMIN — Medication 20 MILLIGRAM(S): at 06:19

## 2020-05-31 RX ADMIN — Medication 325 MILLIGRAM(S): at 14:13

## 2020-05-31 RX ADMIN — Medication 30 MILLILITER(S): at 20:03

## 2020-05-31 RX ADMIN — Medication 1 GRAM(S): at 21:59

## 2020-05-31 RX ADMIN — Medication 1 GRAM(S): at 14:13

## 2020-05-31 RX ADMIN — Medication 1 GRAM(S): at 06:19

## 2020-05-31 RX ADMIN — CEFTRIAXONE 100 MILLIGRAM(S): 500 INJECTION, POWDER, FOR SOLUTION INTRAMUSCULAR; INTRAVENOUS at 16:06

## 2020-05-31 RX ADMIN — ENOXAPARIN SODIUM 40 MILLIGRAM(S): 100 INJECTION SUBCUTANEOUS at 14:13

## 2020-05-31 NOTE — DISCHARGE NOTE PROVIDER - NSDCMRMEDTOKEN_GEN_ALL_CORE_FT
atorvastatin 40 mg oral tablet: 1 tab(s) orally once a day  Carafate 1 g/10 mL oral suspension: 10 milliliter(s) orally 4 times a day (before meals and at bedtime)  ferrous sulfate 325 mg (65 mg elemental iron) oral tablet: 1 tab(s) orally once a day  Lasix 20 mg oral tablet: 1 tab(s) orally once a day  potassium chloride 20 mEq oral granule, extended release:   Ventolin 5 mg/mL (0.5%) inhalation solution: 0.5 milliliter(s) inhaled every 6 hours, As Needed    **NOTE: At home, she uses ventolin 90mcg hfa inhaler 2 puffs every 6 hours as needed** acetaminophen 325 mg oral tablet: 2 tab(s) orally every 6 hours, As needed, Temp greater or equal to 38C (100.4F), Moderate Pain (4 - 6)  atorvastatin 40 mg oral tablet: 1 tab(s) orally once a day  Carafate 1 g/10 mL oral suspension: 10 milliliter(s) orally 4 times a day (before meals and at bedtime)  ferrous sulfate 325 mg (65 mg elemental iron) oral tablet: 1 tab(s) orally once a day  Lasix 20 mg oral tablet: 1 tab(s) orally once a day  potassium chloride 20 mEq oral granule, extended release:   Ventolin 5 mg/mL (0.5%) inhalation solution: 0.5 milliliter(s) inhaled every 6 hours, As Needed    **NOTE: At home, she uses ventolin 90mcg hfa inhaler 2 puffs every 6 hours as needed**

## 2020-05-31 NOTE — DISCHARGE NOTE PROVIDER - HOSPITAL COURSE
89 y/o F PMHx R hip fracture s/p IMN on 6/25/19, CAD s/p CABGx3 >20 years ago (not on aspirin due to hx of GIB), HFpEF, HTN, mild intermittent asthma, presents after fall at home complaining of difficulty ambulating. Pt states she was trying to get out of bed and then fell on her R side. She had R hip pain but overall has improved and she is able to move both legs but hard for walk. Denies urinary incontinence, back pain, saddle anesthesia, neck pain. Denies dysuria, n/v, f/c, chest pain, sob, cough, sick contacts, syncope, LOC

## 2020-05-31 NOTE — DISCHARGE NOTE PROVIDER - CARE PROVIDER_API CALL
Tsering Lou)  Geriatric Medicine; Internal Medicine  [address] [place] [address], [address]  [place] [address]  Phone: [phone]  Fax: [phone]  Follow Up Time:

## 2020-05-31 NOTE — PROGRESS NOTE ADULT - SUBJECTIVE AND OBJECTIVE BOX
PROGRESS NOTE:     Patient is a 89y old  Female who presents with a chief complaint of fall (28 May 2020 15:07)      SUBJECTIVE / OVERNIGHT EVENTS:No acute events overnight. Patient more coherent today. Knew she was in a hospital. Has pain in right shoulder, but no othre complaints. Wants to go home. Knows she lives with her daughter.     ADDITIONAL REVIEW OF SYSTEMS:  No n/v/d  No fevers or chills      MEDICATIONS  (STANDING):  atorvastatin 40 milliGRAM(s) Oral at bedtime  cefTRIAXone   IVPB 1000 milliGRAM(s) IV Intermittent every 24 hours  enoxaparin Injectable 40 milliGRAM(s) SubCutaneous daily  ferrous    sulfate 325 milliGRAM(s) Oral daily  furosemide    Tablet 20 milliGRAM(s) Oral daily  sucralfate suspension 1 Gram(s) Oral two times a day    MEDICATIONS  (PRN):  ALBUTerol    0.083% 2.5 milliGRAM(s) Nebulizer every 6 hours PRN Shortness of Breath and/or Wheezing      CAPILLARY BLOOD GLUCOSE      =I&O's Summary    30 May 2020 07:01  -  31 May 2020 07:00  --------------------------------------------------------  IN: 460 mL / OUT: 0 mL / NET: 460 mL          Vital Signs Last 24 Hrs  T(C): 36.2 (31 May 2020 11:42), Max: 36.7 (30 May 2020 20:54)  T(F): 97.2 (31 May 2020 11:42), Max: 98 (30 May 2020 20:54)  HR: 72 (31 May 2020 11:42) (60 - 73)  BP: 114/64 (31 May 2020 11:42) (114/64 - 160/75)  BP(mean): --  RR: 18 (31 May 2020 11:42) (18 - 18)  SpO2: 98% (31 May 2020 11:42) (94% - 98%)    CONSTITUTIONAL: NAD, well-developed, non toxic appearing  RESPIRATORY: Normal respiratory effort; lungs are clear to auscultation bilaterally  CARDIOVASCULAR: Regular rate and rhythm, normal S1 and S2, no murmur/rub/gallop; No lower extremity edema; Peripheral pulses are 2+ bilaterally  ABDOMEN: Nontender to palpation, normoactive bowel sounds, no rebound/guarding; No hepatosplenomegaly  MUSCLOSKELETAL: pain to palpation of right hsoulder medial joint, unable to actively abduct without pain. +TTP right hip bursa.     PSYCH: AAOx1    LABS:                              RADIOLOGY & ADDITIONAL TESTS:  Results Reviewed:   Imaging Personally Reviewed:  < from: Xray Hip w/ Pelvis 2 or 3 Views, Right (05.28.20 @ 10:05) >  IMPRESSION:   Diffuse osseous demineralization limits evaluation. Evaluation of the sacrum is limited by overlying bowel. Redemonstrated postsurgical changes status post fixation of proximal right femur fracture with intramedullary giulia and proximal and distal screws in place. No acute displaced fracture or dislocation is seen in the right hip/right femur. Arthritic changes of the right knee, evaluation limited. No acute displaced fracture is seen in the bony pelvis. Degenerative changes in the lumbar spine are partially imaged. Pubic symphysis arthrosis. Mild arthritic changes of bilateral hips. Vascular calcifications.      < from: Xray Shoulder 2 Views, Right (05.30.20 @ 16:30) >    IMPRESSION: Diffuse osseous demineralization limits evaluation.  No acute displaced fracture or dislocation is seen in the right shoulder. The humeral head is high riding, suggesting rotator cuff pathology.      < end of copied text >      COORDINATION OF CARE:  Care Discussed with Consultants/Other Providers [Y/N]:  Prior or Outpatient Records Reviewed [Y/N]:

## 2020-05-31 NOTE — PROGRESS NOTE ADULT - PROBLEM SELECTOR PLAN 3
euvolemic aside from some LE dependent edema  -c/w home po lasix 20mg daily. If becomes dizzy at home would change to every other day.   trend bmp

## 2020-05-31 NOTE — DISCHARGE NOTE PROVIDER - NSDCCPCAREPLAN_GEN_ALL_CORE_FT
PRINCIPAL DISCHARGE DIAGNOSIS  Diagnosis: UTI (urinary tract infection)  Assessment and Plan of Treatment:       SECONDARY DISCHARGE DIAGNOSES  Diagnosis: Weakness  Assessment and Plan of Treatment: PRINCIPAL DISCHARGE DIAGNOSIS  Diagnosis: UTI (urinary tract infection)  Assessment and Plan of Treatment: Completed treatment      SECONDARY DISCHARGE DIAGNOSES  Diagnosis: Weakness  Assessment and Plan of Treatment: Will go home with home PT and medicaid aids.

## 2020-06-01 ENCOUNTER — FORM ENCOUNTER (OUTPATIENT)
Age: 85
End: 2020-06-01

## 2020-06-01 VITALS
RESPIRATION RATE: 18 BRPM | OXYGEN SATURATION: 97 % | HEART RATE: 61 BPM | TEMPERATURE: 98 F | DIASTOLIC BLOOD PRESSURE: 68 MMHG | SYSTOLIC BLOOD PRESSURE: 115 MMHG

## 2020-06-01 PROCEDURE — 73030 X-RAY EXAM OF SHOULDER: CPT

## 2020-06-01 PROCEDURE — 97161 PT EVAL LOW COMPLEX 20 MIN: CPT

## 2020-06-01 PROCEDURE — 71045 X-RAY EXAM CHEST 1 VIEW: CPT

## 2020-06-01 PROCEDURE — 80048 BASIC METABOLIC PNL TOTAL CA: CPT

## 2020-06-01 PROCEDURE — 73502 X-RAY EXAM HIP UNI 2-3 VIEWS: CPT

## 2020-06-01 PROCEDURE — 81001 URINALYSIS AUTO W/SCOPE: CPT

## 2020-06-01 PROCEDURE — 82550 ASSAY OF CK (CPK): CPT

## 2020-06-01 PROCEDURE — 85027 COMPLETE CBC AUTOMATED: CPT

## 2020-06-01 PROCEDURE — 87086 URINE CULTURE/COLONY COUNT: CPT

## 2020-06-01 PROCEDURE — 72125 CT NECK SPINE W/O DYE: CPT

## 2020-06-01 PROCEDURE — 87186 SC STD MICRODIL/AGAR DIL: CPT

## 2020-06-01 PROCEDURE — 96374 THER/PROPH/DIAG INJ IV PUSH: CPT

## 2020-06-01 PROCEDURE — 93005 ELECTROCARDIOGRAM TRACING: CPT

## 2020-06-01 PROCEDURE — 97530 THERAPEUTIC ACTIVITIES: CPT

## 2020-06-01 PROCEDURE — 80053 COMPREHEN METABOLIC PANEL: CPT

## 2020-06-01 PROCEDURE — 99239 HOSP IP/OBS DSCHRG MGMT >30: CPT

## 2020-06-01 PROCEDURE — 99285 EMERGENCY DEPT VISIT HI MDM: CPT | Mod: 25

## 2020-06-01 PROCEDURE — 70450 CT HEAD/BRAIN W/O DYE: CPT

## 2020-06-01 PROCEDURE — 97116 GAIT TRAINING THERAPY: CPT

## 2020-06-01 PROCEDURE — 73552 X-RAY EXAM OF FEMUR 2/>: CPT

## 2020-06-01 RX ORDER — ACETAMINOPHEN 500 MG
2 TABLET ORAL
Qty: 0 | Refills: 0 | DISCHARGE
Start: 2020-06-01

## 2020-06-01 RX ADMIN — ENOXAPARIN SODIUM 40 MILLIGRAM(S): 100 INJECTION SUBCUTANEOUS at 11:12

## 2020-06-01 RX ADMIN — Medication 1 GRAM(S): at 05:49

## 2020-06-01 RX ADMIN — Medication 1 GRAM(S): at 11:12

## 2020-06-01 RX ADMIN — Medication 1 GRAM(S): at 01:50

## 2020-06-01 RX ADMIN — Medication 325 MILLIGRAM(S): at 11:12

## 2020-06-01 RX ADMIN — Medication 20 MILLIGRAM(S): at 05:50

## 2020-06-01 NOTE — PROGRESS NOTE ADULT - ASSESSMENT
89 y/o F PMHx R hip fracture s/p IMN on 6/25/19, CAD s/p CABGx3 >20 years ago (not on aspirin due to hx of GIB), HFpEF, HTN, mild intermittent asthma, presents after fall at home complaining of difficulty ambulating also with UTI.

## 2020-06-01 NOTE — PROGRESS NOTE ADULT - SUBJECTIVE AND OBJECTIVE BOX
PROGRESS NOTE:     Patient is a 89y old  Female who presents with a chief complaint of fall (31 May 2020 12:21)      SUBJECTIVE / OVERNIGHT EVENTS: NIRALI, pt ready to go home.     ADDITIONAL REVIEW OF SYSTEMS:  no fevers or chills  no n/v/d      MEDICATIONS  (STANDING):  atorvastatin 40 milliGRAM(s) Oral at bedtime  enoxaparin Injectable 40 milliGRAM(s) SubCutaneous daily  ferrous    sulfate 325 milliGRAM(s) Oral daily  furosemide    Tablet 20 milliGRAM(s) Oral daily  melatonin 3 milliGRAM(s) Oral at bedtime  sucralfate suspension 1 Gram(s) Oral four times a day    MEDICATIONS  (PRN):  acetaminophen   Tablet .. 650 milliGRAM(s) Oral every 6 hours PRN Temp greater or equal to 38C (100.4F), Moderate Pain (4 - 6)  ALBUTerol    0.083% 2.5 milliGRAM(s) Nebulizer every 6 hours PRN Shortness of Breath and/or Wheezing  aluminum hydroxide/magnesium hydroxide/simethicone Suspension 30 milliLiter(s) Oral every 6 hours PRN Dyspepsia      CAPILLARY BLOOD GLUCOSE        I&O's Summary    31 May 2020 07:01  -  01 Jun 2020 07:00  --------------------------------------------------------  IN: 480 mL / OUT: 0 mL / NET: 480 mL    01 Jun 2020 07:01  -  01 Jun 2020 11:11  --------------------------------------------------------  IN: 120 mL / OUT: 0 mL / NET: 120 mL        PHYSICAL EXAM:  Vital Signs Last 24 Hrs  T(C): 36.9 (31 May 2020 20:46), Max: 36.9 (31 May 2020 20:46)  T(F): 98.4 (31 May 2020 20:46), Max: 98.4 (31 May 2020 20:46)  HR: 66 (31 May 2020 20:46) (66 - 72)  BP: 107/64 (31 May 2020 20:46) (107/64 - 114/64)  BP(mean): --  RR: 18 (31 May 2020 20:46) (18 - 18)  SpO2: 95% (31 May 2020 20:46) (95% - 98%)    CONSTITUTIONAL: NAD, well-developed, non toxic   RESPIRATORY: Normal respiratory effort; lungs are clear to auscultation bilaterally  CARDIOVASCULAR: Regular rate and rhythm, normal S1 and S2, no murmur/rub/gallop; No lower extremity edema; Peripheral pulses are 2+ bilaterally  ABDOMEN: Nontender to palpation, normoactive bowel sounds, no rebound/guarding; No hepatosplenomegaly  MUSCLOSKELETAL: no clubbing or cyanosis of digits; no joint swelling or tenderness to palpation  PSYCH: A+O to person, place     LABS:                      RADIOLOGY & ADDITIONAL TESTS:  Results Reviewed:   Imaging Personally Reviewed:  Electrocardiogram Personally Reviewed:    COORDINATION OF CARE:  Care Discussed with Consultants/Other Providers [Y/N]:  Prior or Outpatient Records Reviewed [Y/N]:

## 2020-06-01 NOTE — PROGRESS NOTE ADULT - PROBLEM SELECTOR PLAN 7
dvt ppx: lovenox 40mg daily  -given mental status concerned for safety at home. Will reinstate aids and DC with home PT.  Discharge time 45 minutes.

## 2020-06-01 NOTE — PROGRESS NOTE ADULT - PROBLEM SELECTOR PLAN 1
s/p fall at home with resultant R hip pain   xray of R hip/femur/pelvis without fracture  or displacement of IMN. Able to ambulate with PT.   -xray shoulder without fracture ,possible rotator cuff pathology. Outpatient ortho if in line with goals of care   PT eval-->home with home  PT  pain control with tylenol 650mg q6 prn
s/p fall at home with resultant R hip pain   xray of R hip/femur/pelvis without fracture  or displacement of IMN. Able to ambulate with PT.   -xray shoulder without fracture ,possible rotator cuff pathology. Outpatient ortho if in line with goals of care  PT eval-->home with home  PT  pain control with tylenol 650mg q6 prn
s/p fall at home with resultant R hip pain   xray of R hip/femur/pelvis without fracture  or displacement of IMN. Able to mabulate with PT.   PT eval-->home with home  PT  Xray shoulder as unable to lift.    pain control with tylenol 650mg q6 prn
s/p fall at home with resultant R hip pain  prelim unofficial xray of R hip/femur/pelvis without fracture    PT eval  pain control with tylenol 650mg q6 prn
right

## 2020-06-05 ENCOUNTER — APPOINTMENT (OUTPATIENT)
Dept: GERIATRICS | Facility: CLINIC | Age: 85
End: 2020-06-05
Payer: MEDICARE

## 2020-06-05 VITALS
TEMPERATURE: 98.9 F | HEART RATE: 78 BPM | BODY MASS INDEX: 23.99 KG/M2 | SYSTOLIC BLOOD PRESSURE: 120 MMHG | RESPIRATION RATE: 14 BRPM | OXYGEN SATURATION: 98 % | WEIGHT: 122.19 LBS | HEIGHT: 60 IN | DIASTOLIC BLOOD PRESSURE: 70 MMHG

## 2020-06-05 DIAGNOSIS — Z87.11 PERSONAL HISTORY OF PEPTIC ULCER DISEASE: ICD-10-CM
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
## 2020-06-05 DIAGNOSIS — G47.9 SLEEP DISORDER, UNSPECIFIED: ICD-10-CM

## 2020-06-05 DIAGNOSIS — R32 UNSPECIFIED URINARY INCONTINENCE: ICD-10-CM

## 2020-06-05 PROBLEM — K27.9 PEPTIC ULCER, SITE UNSPECIFIED, UNSPECIFIED AS ACUTE OR CHRONIC, WITHOUT HEMORRHAGE OR PERFORATION: Chronic | Status: ACTIVE | Noted: 2020-05-28

## 2020-06-05 PROBLEM — M48.00 SPINAL STENOSIS, SITE UNSPECIFIED: Chronic | Status: ACTIVE | Noted: 2020-05-28

## 2020-06-05 PROBLEM — I50.30 UNSPECIFIED DIASTOLIC (CONGESTIVE) HEART FAILURE: Chronic | Status: ACTIVE | Noted: 2020-05-28

## 2020-06-05 PROBLEM — I25.10 ATHEROSCLEROTIC HEART DISEASE OF NATIVE CORONARY ARTERY WITHOUT ANGINA PECTORIS: Chronic | Status: ACTIVE | Noted: 2020-05-28

## 2020-06-05 PROBLEM — E78.5 HYPERLIPIDEMIA, UNSPECIFIED: Chronic | Status: ACTIVE | Noted: 2020-05-28

## 2020-06-05 PROCEDURE — 99495 TRANSJ CARE MGMT MOD F2F 14D: CPT

## 2020-06-05 NOTE — PHYSICAL EXAM
[General Appearance - Alert] : alert [General Appearance - In No Acute Distress] : in no acute distress [Sclera] : the sclera and conjunctiva were normal [Extraocular Movements] : extraocular movements were intact [Respiration, Rhythm And Depth] : normal respiratory rhythm and effort [] : no respiratory distress [Exaggerated Use Of Accessory Muscles For Inspiration] : no accessory muscle use [Abdomen Tenderness] : non-tender [No Spinal Tenderness] : no spinal tenderness [Skin Color & Pigmentation] : normal skin color and pigmentation [FreeTextEntry1] : cognitive impairment

## 2020-06-05 NOTE — REVIEW OF SYSTEMS
[Eyesight Problems] : eyesight problems [Loss Of Hearing] : hearing loss [Incontinence] : incontinence [Lower Ext Edema] : lower extremity edema [As Noted in HPI] : as noted in HPI [Sleep Disturbances] : sleep disturbances [Negative] : Heme/Lymph

## 2020-06-05 NOTE — ASSESSMENT
[FreeTextEntry1] : covid pcr negative 5/28/20\par \par anemia: no longer on aspirin or xarelto without any recurrent bleeding from suspected ugi bleed with hx of peptic ulcer. c/w carafate,previous hgb ~9\par chf: HFpEF on echo from 12/2019. c/w lasix 20mg daily and kcl\par cad s/p cabg: no longer on asa due to gi bleed. c/w lipitor, pt tolerating. c/w use of aspercreme for sternotomy wires. advised to notify MD if there is skin breakage. goal is for conservative management\par asthma: mild intermittent: prn inhaler for sob\par spinal stenosis: avoidance of NSAIDs given hx of peptic ulcers and GIB\par memory loss: c/w supportive care and assistance with HHA \par ACP: previously has discussed MOLST, pt is DNR/DNI, molst needs to be scanned in, f/u next visit to have dtr bring ni copy\par

## 2020-06-05 NOTE — HISTORY OF PRESENT ILLNESS
[FreeTextEntry1] : 89yoF with pmhx of CAD s/p CABG, cervical spinal stenosis, HLD, HFpEF , Asthma, R hip FXR June 2019 s/p IMN, developed suspected GIB while on post op xarelto, no longer on asa or AC, and dementia who is seen for fu after hospitalization after a fall at home with right hip pain and also found to have UTI at [facility] from 5/28-6/1. dtr Evy providing helping with history today.\par she was treated for the uti.\par Xray of hip and shoulder without fracture.\par states since coming home still with some ongoing right hip and right shoulder pain.  She is taking tylenol 500mg 2 tabs tid.\par denies any new falls since being home.\par lower ext edema minimal,  Her breathing is unlabored and at her baseline.\par wears soft c collar to spinal stenosis\par ambulation is stable. \par eating stable.\par has HHA 10 hrs daily\par anemia: denies any recurrent bleeding. \par \par homecare setup after discharge, home PT has not yet started.\par \par also complains of difficulty sleeping at night.

## 2020-06-25 ENCOUNTER — INPATIENT (INPATIENT)
Facility: HOSPITAL | Age: 85
LOS: 6 days | Discharge: HOME | DRG: 563 | End: 2020-07-02
Attending: HOSPITALIST | Admitting: STUDENT IN AN ORGANIZED HEALTH CARE EDUCATION/TRAINING PROGRAM
Payer: MEDICARE

## 2020-06-25 VITALS
DIASTOLIC BLOOD PRESSURE: 69 MMHG | SYSTOLIC BLOOD PRESSURE: 147 MMHG | HEART RATE: 77 BPM | TEMPERATURE: 98 F | RESPIRATION RATE: 18 BRPM | OXYGEN SATURATION: 95 %

## 2020-06-25 DIAGNOSIS — Z98.890 OTHER SPECIFIED POSTPROCEDURAL STATES: Chronic | ICD-10-CM

## 2020-06-25 DIAGNOSIS — S43.006A UNSPECIFIED DISLOCATION OF UNSPECIFIED SHOULDER JOINT, INITIAL ENCOUNTER: ICD-10-CM

## 2020-06-25 LAB
ALBUMIN SERPL ELPH-MCNC: 3.7 G/DL — SIGNIFICANT CHANGE UP (ref 3.3–5)
ALP SERPL-CCNC: 88 U/L — SIGNIFICANT CHANGE UP (ref 40–120)
ALT FLD-CCNC: 18 U/L — SIGNIFICANT CHANGE UP (ref 10–45)
ANION GAP SERPL CALC-SCNC: 11 MMOL/L — SIGNIFICANT CHANGE UP (ref 5–17)
APPEARANCE UR: CLEAR — SIGNIFICANT CHANGE UP
APTT BLD: 25.1 SEC — LOW (ref 27.5–36.3)
AST SERPL-CCNC: 36 U/L — SIGNIFICANT CHANGE UP (ref 10–40)
BACTERIA # UR AUTO: NEGATIVE — SIGNIFICANT CHANGE UP
BASOPHILS # BLD AUTO: 0.02 K/UL — SIGNIFICANT CHANGE UP (ref 0–0.2)
BASOPHILS NFR BLD AUTO: 0.2 % — SIGNIFICANT CHANGE UP (ref 0–2)
BILIRUB SERPL-MCNC: 0.3 MG/DL — SIGNIFICANT CHANGE UP (ref 0.2–1.2)
BILIRUB UR-MCNC: NEGATIVE — SIGNIFICANT CHANGE UP
BLD GP AB SCN SERPL QL: NEGATIVE — SIGNIFICANT CHANGE UP
BUN SERPL-MCNC: 35 MG/DL — HIGH (ref 7–23)
CALCIUM SERPL-MCNC: 9.2 MG/DL — SIGNIFICANT CHANGE UP (ref 8.4–10.5)
CHLORIDE SERPL-SCNC: 104 MMOL/L — SIGNIFICANT CHANGE UP (ref 96–108)
CK MB BLD-MCNC: 2.4 % — SIGNIFICANT CHANGE UP (ref 0–3.5)
CK MB CFR SERPL CALC: 25.8 NG/ML — HIGH (ref 0–3.8)
CK SERPL-CCNC: 1058 U/L — HIGH (ref 25–170)
CO2 SERPL-SCNC: 23 MMOL/L — SIGNIFICANT CHANGE UP (ref 22–31)
COLOR SPEC: SIGNIFICANT CHANGE UP
CREAT SERPL-MCNC: 0.9 MG/DL — SIGNIFICANT CHANGE UP (ref 0.5–1.3)
DIFF PNL FLD: ABNORMAL
EOSINOPHIL # BLD AUTO: 0.21 K/UL — SIGNIFICANT CHANGE UP (ref 0–0.5)
EOSINOPHIL NFR BLD AUTO: 1.6 % — SIGNIFICANT CHANGE UP (ref 0–6)
EPI CELLS # UR: 3 /HPF — SIGNIFICANT CHANGE UP
FOLATE SERPL-MCNC: 12.5 NG/ML — SIGNIFICANT CHANGE UP
GAS PNL BLDV: SIGNIFICANT CHANGE UP
GLUCOSE SERPL-MCNC: 137 MG/DL — HIGH (ref 70–99)
GLUCOSE UR QL: NEGATIVE — SIGNIFICANT CHANGE UP
HCT VFR BLD CALC: 30.7 % — LOW (ref 34.5–45)
HGB BLD-MCNC: 9.5 G/DL — LOW (ref 11.5–15.5)
HYALINE CASTS # UR AUTO: 3 /LPF — HIGH (ref 0–2)
IMM GRANULOCYTES NFR BLD AUTO: 0.3 % — SIGNIFICANT CHANGE UP (ref 0–1.5)
INR BLD: 0.97 RATIO — SIGNIFICANT CHANGE UP (ref 0.88–1.16)
KETONES UR-MCNC: NEGATIVE — SIGNIFICANT CHANGE UP
LEUKOCYTE ESTERASE UR-ACNC: NEGATIVE — SIGNIFICANT CHANGE UP
LIDOCAIN IGE QN: 24 U/L — SIGNIFICANT CHANGE UP (ref 7–60)
LYMPHOCYTES # BLD AUTO: 0.47 K/UL — LOW (ref 1–3.3)
LYMPHOCYTES # BLD AUTO: 3.6 % — LOW (ref 13–44)
MAGNESIUM SERPL-MCNC: 2.2 MG/DL — SIGNIFICANT CHANGE UP (ref 1.6–2.6)
MCHC RBC-ENTMCNC: 29.9 PG — SIGNIFICANT CHANGE UP (ref 27–34)
MCHC RBC-ENTMCNC: 30.9 GM/DL — LOW (ref 32–36)
MCV RBC AUTO: 96.5 FL — SIGNIFICANT CHANGE UP (ref 80–100)
MONOCYTES # BLD AUTO: 1.44 K/UL — HIGH (ref 0–0.9)
MONOCYTES NFR BLD AUTO: 11 % — SIGNIFICANT CHANGE UP (ref 2–14)
NEUTROPHILS # BLD AUTO: 10.96 K/UL — HIGH (ref 1.8–7.4)
NEUTROPHILS NFR BLD AUTO: 83.3 % — HIGH (ref 43–77)
NITRITE UR-MCNC: NEGATIVE — SIGNIFICANT CHANGE UP
NRBC # BLD: 0 /100 WBCS — SIGNIFICANT CHANGE UP (ref 0–0)
NT-PROBNP SERPL-SCNC: 1535 PG/ML — HIGH (ref 0–300)
PH UR: 6 — SIGNIFICANT CHANGE UP (ref 5–8)
PHOSPHATE SERPL-MCNC: 3.5 MG/DL — SIGNIFICANT CHANGE UP (ref 2.5–4.5)
PLATELET # BLD AUTO: 311 K/UL — SIGNIFICANT CHANGE UP (ref 150–400)
POTASSIUM SERPL-MCNC: 4.2 MMOL/L — SIGNIFICANT CHANGE UP (ref 3.5–5.3)
POTASSIUM SERPL-SCNC: 4.2 MMOL/L — SIGNIFICANT CHANGE UP (ref 3.5–5.3)
PROT SERPL-MCNC: 6.1 G/DL — SIGNIFICANT CHANGE UP (ref 6–8.3)
PROT UR-MCNC: ABNORMAL
PROTHROM AB SERPL-ACNC: 11.1 SEC — SIGNIFICANT CHANGE UP (ref 10–12.9)
RBC # BLD: 3.18 M/UL — LOW (ref 3.8–5.2)
RBC # FLD: 15.7 % — HIGH (ref 10.3–14.5)
RBC CASTS # UR COMP ASSIST: 3 /HPF — SIGNIFICANT CHANGE UP (ref 0–4)
RH IG SCN BLD-IMP: POSITIVE — SIGNIFICANT CHANGE UP
SARS-COV-2 RNA SPEC QL NAA+PROBE: SIGNIFICANT CHANGE UP
SODIUM SERPL-SCNC: 138 MMOL/L — SIGNIFICANT CHANGE UP (ref 135–145)
SP GR SPEC: 1.04 — HIGH (ref 1.01–1.02)
TROPONIN T, HIGH SENSITIVITY RESULT: 47 NG/L — SIGNIFICANT CHANGE UP (ref 0–51)
TROPONIN T, HIGH SENSITIVITY RESULT: 50 NG/L — SIGNIFICANT CHANGE UP (ref 0–51)
TSH SERPL-MCNC: 2.94 UIU/ML — SIGNIFICANT CHANGE UP (ref 0.27–4.2)
UROBILINOGEN FLD QL: NEGATIVE — SIGNIFICANT CHANGE UP
VIT B12 SERPL-MCNC: 251 PG/ML — SIGNIFICANT CHANGE UP (ref 232–1245)
WBC # BLD: 13.14 K/UL — HIGH (ref 3.8–10.5)
WBC # FLD AUTO: 13.14 K/UL — HIGH (ref 3.8–10.5)
WBC UR QL: 1 /HPF — SIGNIFICANT CHANGE UP (ref 0–5)

## 2020-06-25 PROCEDURE — 70450 CT HEAD/BRAIN W/O DYE: CPT | Mod: 26

## 2020-06-25 PROCEDURE — 73090 X-RAY EXAM OF FOREARM: CPT | Mod: 26,LT

## 2020-06-25 PROCEDURE — 73030 X-RAY EXAM OF SHOULDER: CPT | Mod: 26,LT

## 2020-06-25 PROCEDURE — 72128 CT CHEST SPINE W/O DYE: CPT | Mod: 26

## 2020-06-25 PROCEDURE — 73020 X-RAY EXAM OF SHOULDER: CPT | Mod: 26,LT,59

## 2020-06-25 PROCEDURE — 73070 X-RAY EXAM OF ELBOW: CPT | Mod: 26,59,LT

## 2020-06-25 PROCEDURE — 73110 X-RAY EXAM OF WRIST: CPT | Mod: 26,LT

## 2020-06-25 PROCEDURE — 73502 X-RAY EXAM HIP UNI 2-3 VIEWS: CPT | Mod: 26,LT

## 2020-06-25 PROCEDURE — 99285 EMERGENCY DEPT VISIT HI MDM: CPT

## 2020-06-25 PROCEDURE — 73120 X-RAY EXAM OF HAND: CPT | Mod: 26,LT

## 2020-06-25 PROCEDURE — 73080 X-RAY EXAM OF ELBOW: CPT | Mod: 26,LT

## 2020-06-25 PROCEDURE — 74177 CT ABD & PELVIS W/CONTRAST: CPT | Mod: 26

## 2020-06-25 PROCEDURE — 72125 CT NECK SPINE W/O DYE: CPT | Mod: 26

## 2020-06-25 PROCEDURE — 73552 X-RAY EXAM OF FEMUR 2/>: CPT | Mod: 26,LT

## 2020-06-25 PROCEDURE — 71260 CT THORAX DX C+: CPT | Mod: 26

## 2020-06-25 PROCEDURE — 71045 X-RAY EXAM CHEST 1 VIEW: CPT | Mod: 26

## 2020-06-25 PROCEDURE — 73060 X-RAY EXAM OF HUMERUS: CPT | Mod: 26,LT

## 2020-06-25 RX ORDER — ACETAMINOPHEN 500 MG
650 TABLET ORAL ONCE
Refills: 0 | Status: COMPLETED | OUTPATIENT
Start: 2020-06-25 | End: 2020-06-25

## 2020-06-25 RX ORDER — SODIUM CHLORIDE 9 MG/ML
500 INJECTION, SOLUTION INTRAVENOUS ONCE
Refills: 0 | Status: COMPLETED | OUTPATIENT
Start: 2020-06-25 | End: 2020-06-25

## 2020-06-25 RX ORDER — HALOPERIDOL DECANOATE 100 MG/ML
1 INJECTION INTRAMUSCULAR ONCE
Refills: 0 | Status: COMPLETED | OUTPATIENT
Start: 2020-06-25 | End: 2020-06-25

## 2020-06-25 RX ORDER — ACETAMINOPHEN 500 MG
650 TABLET ORAL ONCE
Refills: 0 | Status: DISCONTINUED | OUTPATIENT
Start: 2020-06-25 | End: 2020-06-25

## 2020-06-25 RX ORDER — MORPHINE SULFATE 50 MG/1
2 CAPSULE, EXTENDED RELEASE ORAL ONCE
Refills: 0 | Status: DISCONTINUED | OUTPATIENT
Start: 2020-06-25 | End: 2020-06-25

## 2020-06-25 RX ADMIN — MORPHINE SULFATE 2 MILLIGRAM(S): 50 CAPSULE, EXTENDED RELEASE ORAL at 18:03

## 2020-06-25 RX ADMIN — SODIUM CHLORIDE 500 MILLILITER(S): 9 INJECTION, SOLUTION INTRAVENOUS at 17:08

## 2020-06-25 RX ADMIN — MORPHINE SULFATE 2 MILLIGRAM(S): 50 CAPSULE, EXTENDED RELEASE ORAL at 18:55

## 2020-06-25 RX ADMIN — HALOPERIDOL DECANOATE 1 MILLIGRAM(S): 100 INJECTION INTRAMUSCULAR at 17:12

## 2020-06-25 RX ADMIN — SODIUM CHLORIDE 500 MILLILITER(S): 9 INJECTION, SOLUTION INTRAVENOUS at 14:34

## 2020-06-25 NOTE — ED PROVIDER NOTE - PROGRESS NOTE DETAILS
hip fx / shoulder dislocation / mild agitation. endorsed to dr buitrago pending orthopedics procedures and admit. -Cheng Baker MD- MONICA Low: ortho team at bedside who will attempt to adjust shoulder dislocation and awaiting recs regarding possible femur fracture. ortho requesting pain meds prior to bedside procedure. ED attending Dr. Pruitt recommended morphine 2mg IV MONICA Low: ortho team at bedside who will attempt to shoulder reduction and awaiting recs regarding possible femur fracture. ortho requesting pain meds prior to performing shoulder reduction at bedside. ED attending Dr. Pruitt recommended morphine 2mg IV MONICA Low: updated daughter Evy regarding work up this far and will require admission to the hospital MONICA Low: while orthopedics was reducing left shoulder and tugging on left forearm, left forearm became degloved. orthopedics wrapped left forearm in xerform dressing and cling. orthopedics recommended plastics consult. paged on call plastic Dr. Blum awaiting call back MONICA Low: while orthopedics was reducing left shoulder and tugging on left forearm, skin of left forearm became degloved. orthopedics wrapped left forearm in xerform dressing and cling. orthopedics recommended plastics consult. paged on call plastic Dr. Blum awaiting call back MONICA Low: spoke to on call Dr. Blum who requested for ortho resident to contact him personally to know the exact course of events and determine next course of action. spoke to ortho resident Miguel who will speak to plastics Dr. Samuel Magana Ortho feels pt medically complex and ask tba medicine for better management   Isabel Morel PA-C

## 2020-06-25 NOTE — ED PROVIDER NOTE - PHYSICAL EXAMINATION
skin: bruising to  MSK : left shoulder deformity and posterior bruising skin: bruising to  MSK : left shoulder deformity and posterior bruising    SEE ATTG

## 2020-06-25 NOTE — ED PROVIDER NOTE - PLAN OF CARE
left shoulder. gait instability. possible L hip fracture. fall from standing versus syncope. frailty. mild agitation

## 2020-06-25 NOTE — ED ADULT NURSE NOTE - PMH
(HFpEF) heart failure with preserved ejection fraction    CAD (coronary artery disease)    HLD (hyperlipidemia)    Peptic ulcer disease    Spinal stenosis

## 2020-06-25 NOTE — CONSULT NOTE ADULT - SUBJECTIVE AND OBJECTIVE BOX
Patient is a 89yFemale demented minimal ambulator with assistive devices who presents to [facility] ED w/ a c/o of left shoulder pain s/p unwitnessed fall. Patient confused, does not recall fall, minimal history obtained 2/2 baseline dementia Denies HS/LOC. Diffuse pain throughout BLLE & LUE, unable to localize. Unknown if patient was able to ambulate s/p fall. Denies any numbness or tingling.  PMHx significant for right hip IMN (Dr. Smith, '19).     PAST MEDICAL & SURGICAL HISTORY:  (HFpEF) heart failure with preserved ejection fraction  Spinal stenosis  Peptic ulcer disease  HLD (hyperlipidemia)  CAD (coronary artery disease)  Status post hip surgery    aspirin (Other)    PHYSICAL EXAM:  T(C): 36.8 (06-25-20 @ 21:55), Max: 37.2 (06-25-20 @ 13:20)  HR: 84 (06-25-20 @ 21:55) (75 - 89)  BP: 115/49 (06-25-20 @ 21:55) (115/49 - 176/89)  RR: 20 (06-25-20 @ 21:55) (17 - 20)  SpO2: 92% (06-25-20 @ 21:55) (92% - 97%)    Gen: Agitated, confused, screams w/ minimal ttp diffusely, not following commands, exam difficult to obtain 2/2 dementia and patient noncompliant,   LUE  Skin intact, no erythema or ecchymosis, +swelling and gross deformity of left shoulder  +TTP about the shoulder  Decreased rom about the shoulder  Full ROM of elbow/wrist/fingers  +AIN/PIN/R/M/U/MSc/ax  +SILT C5-T1  + DP    BLLE:  skin intact, no erythema or ecchymosis  +logroll bilaterally  negative heelstrike bilaterally  unable to SLR bilaterally  +TTP diffusely throughout   +  +SILT L2-S1  compartments soft and compressible  BLLE PE inconclusive 2/2 diffuse pain and noncompliance w/ patient    Secondary Survey:   No TTP over bony prominences elsewhere, SILT, palpable pulses, full/painless A/PROM, compartments soft. No TTP over spinous processes or paraspinal muscles at C/T/L spine. No palpable step off. No other injuries or complaints.    Imaging:  XR left shoulder demonstrates shoulder dislocation  Xr left hip compared w/ xr from 2019, grossly unchanged  CT pelvis demonstrates no appreciable fractures/dislocations      Procedure:  Under aseptic conditions, a hematoma block was administered to the left shoulder using 10cc of 1% lidocaine w/ 20cc NS. Closed reduction was achieved multiple times however the shoulder is grossly unstable and redislocated upon adduction to her side. Then reduction in abduction was obtained and held w/ multiple folded blankets and sling maintaining the arm abducted away from body. The patient suffered a moderate sized superficial degloving injury of her left forearm during procedure. The patient's post-reduction neurovascular exam was unchanged. Post-reduction axillary xrays demonstrated improved alignment. The skin was reapproximated from forearm was, xeroform 4x4 gauze and kerlex dressing was placed over skin. Patient is a 89yFemale demented minimal ambulator with assistive devices who presents to [facility] ED w/ a c/o of left shoulder pain s/p unwitnessed fall. Patient confused, does not recall fall, minimal history obtained 2/2 baseline dementia Denies HS/LOC. Diffuse pain throughout BLLE & LUE, unable to localize. Unknown if patient was able to ambulate s/p fall. Denies any numbness or tingling.  PMHx significant for right hip IMN (Dr. Smith, '19).     PAST MEDICAL & SURGICAL HISTORY:  (HFpEF) heart failure with preserved ejection fraction  Spinal stenosis  Peptic ulcer disease  HLD (hyperlipidemia)  CAD (coronary artery disease)  Status post hip surgery    aspirin (Other)    PHYSICAL EXAM:  T(C): 36.8 (06-25-20 @ 21:55), Max: 37.2 (06-25-20 @ 13:20)  HR: 84 (06-25-20 @ 21:55) (75 - 89)  BP: 115/49 (06-25-20 @ 21:55) (115/49 - 176/89)  RR: 20 (06-25-20 @ 21:55) (17 - 20)  SpO2: 92% (06-25-20 @ 21:55) (92% - 97%)    Gen: Agitated, confused, screams w/ minimal ttp diffusely, not following commands, exam difficult to obtain 2/2 dementia and patient noncompliant,   LUE  Skin intact, no erythema or ecchymosis, +swelling and gross deformity of left shoulder  +TTP about the shoulder  Decreased rom about the shoulder  Full ROM of elbow/wrist/fingers  +AIN/PIN/R/M/U/MSc/ax  +SILT C5-T1  + Rad pulse  +edema    BLLE:  skin intact, no erythema or ecchymosis  +logroll bilaterally  negative heelstrike bilaterally  unable to SLR bilaterally  +TTP diffusely throughout   +  +SILT L2-S1  compartments soft and compressible  + edema  BLLE PE inconclusive 2/2 diffuse pain and noncompliance w/ patient    Secondary Survey:   No TTP over bony prominences elsewhere, SILT, palpable pulses, full/painless A/PROM, compartments soft. No TTP over spinous processes or paraspinal muscles at C/T/L spine. No palpable step off. No other injuries or complaints.    Imaging:  XR left shoulder demonstrates shoulder dislocation  Xr left hip compared w/ xr from 2019, grossly unchanged  CT pelvis demonstrates no appreciable fractures/dislocations      Procedure:  Under aseptic conditions, a hematoma block was administered to the left shoulder using 10cc of 1% lidocaine w/ 20cc NS. Closed reduction was achieved multiple times however the shoulder is grossly unstable and redislocated upon adduction to her side. Then reduction in abduction was obtained and held w/ multiple folded blankets and sling maintaining the arm abducted away from body. The patient suffered a moderate sized superficial degloving injury of her left forearm during procedure. The patient's post-reduction neurovascular exam was unchanged. Post-reduction axillary xrays demonstrated improved alignment. The skin was reapproximated from forearm was, xeroform 4x4 gauze and kerlex dressing was placed over skin.

## 2020-06-25 NOTE — ED ADULT NURSE NOTE - CHIEF COMPLAINT QUOTE
found floor by aid, daughter present at home. left sided pain. unable to stand by self. LE pitting edema. has soft collar in place prior to EMS on scene. pt is at baseline mental status (periods of confusion)

## 2020-06-25 NOTE — ED ADULT NURSE NOTE - OBJECTIVE STATEMENT
89 yo F w/ PMHx of HCF, spinal stenosis, PUD, HLD, CAD presents to ED via EMS from home c/o L shoulder/hip pain. Per EMS pt was found on floor by aide, has HHA 10 hrs per day but not over night, unsure how long down for, pt unable to recall how she came to be on the floor. Pt c/o L hip and shoulder pain and tenderness, bruising noted to posterior L shoulder as well as L shoulder swelling. No shortening or rotations of LLE noted. B/L pedal edema present, no LE redness or warmth noted. No obvious deformities noted. No neck pain/tenderness, denies HA, dizziness, N/V, changes in vision. PERRL. Pt denies any CP, SOB, cough, fever, chills, urinary complaints, constipation, diarrhea, weakness. Pt A&Ox2, confused to time/place, lungs CTA, +central pulses. Abdomen soft, not tender, not distended. Safety and comfort maintained, no acute distress noted at this time. Pt denies any recent travel or known sick contacts.

## 2020-06-25 NOTE — ED PROVIDER NOTE - ATTENDING CONTRIBUTION TO CARE
pt found on ground by health aide. pt does not recall incident.    A primary and secondary survey was performed. Airway: oropharynx clear, Breathing: normal breath sounds bilaterally, pt phonating well, Circulation: Mentation abn - pt confused, pulses palpated in all 4 limbs, no obvious active external hemorrhage, lungs/abd/pelvis/legs wo signs of blood accumulation. Disability: Neuro intact / pupils equal round reactive. Exposure: No external signs of trauma EXCEPT L shoulder bruising over scapula. Spine including c-spine non-tender. pt in soft collar.    Gen: Well appearing not in distress. Head: NC,AT. No hernandez sign/raccoon eyes. ENT: No hemoTM, oropharynx as above, no nasal hemorrhage. Neck: as above. Chest: Lung exam- CTAB, no ttp in chest wall. Cardiac: RRR S1S2, No JVD. Abd: soft, non-tender. Normal in color. Pelvis: Stable. Ext: no ttp in all 3/4 limbs, LUE w pain to rom at shoulder, rom at  R shoulder wnl; bl elbow, hip and knee wnl, Skin: No Abrasions or lacerations. Neuro: GCS 14 , Moving 4 limbs, Speech fluid and clear, pt slightly confused- repeated asking same questions ?able to ambulate. Psych: Normal affect, confused.     will scan head / c spine / xr l shoulder / labs / ekg / xr chest/pelvis. pt very frail appearing and likely unable to amb steady and clearly unable to return to home situation wo 24 hr supervision.

## 2020-06-25 NOTE — ED ADULT TRIAGE NOTE - CHIEF COMPLAINT QUOTE
found floor by aid, daughter present at home. left sided pain. unable to stand by self found floor by aid, daughter present at home. left sided pain. unable to stand by self. LE pitting edema. has soft collar in place prior to EMS on scene. pt is at baseline mental status (periods of confusion)

## 2020-06-25 NOTE — ED PROVIDER NOTE - OBJECTIVE STATEMENT
89 y/o F PMHx R hip fracture s/p IMN on 6/25/19, CAD s/p CABGx3 >20 years ago (not on aspirin due to hx of GIB), HFpEF, HTN brought in via EMS s/p unwitnessed fall for unknown length of time. History obtained from daughter Evy as pt is acutely confused. Evy reported patient has daily home health aid from 8AM-6pm daily and patient was found by HHA this morning at 8AM on the ground lying on her left side. Per daughter, patient was found without clothes with freezer/ fridge open and last time this occurred was two weeks ago in which pt was hospitalized for UTI 2/2 Ecoli. Daughter stated pt has had increased confusion particularly at night time over the last few weeks. Patient main complaint is left shoulder and left hip pain unable to ambulate. Patient unsure if she hit her head or LOC

## 2020-06-25 NOTE — ED ADULT NURSE REASSESSMENT NOTE - NS ED NURSE REASSESS COMMENT FT1
Ortho consult at bedside for eval and attempting to correct dislocated L shoulder. Pt premedicated w/ 2mg morphine IVP per MAR. VSS.
Pt agitated and confused, consistently stating "I need my coat so I can go home", trying to remove tele leads and BP cuff. MD aware. Haldol ordered and administered per MAR.
Pt returned from xray. Assisted w/ use of bedpan/bathroom. Urine samples obtained and sent to lab. VSS. Awaiting radiology results.

## 2020-06-25 NOTE — CONSULT NOTE ADULT - ASSESSMENT
A/P:  Patient is a 89y demented Female w/ grossly unstable left shoulder dislocation    -L shoulder in sling and maintained abducted w/ folded blankets.   -FU gunslinger brace - ordered   -FU xray left shoulder in gunslinger brace when delivered   -NWB LUE, WBAT BLLE  -Patient likely unable tolerate mri of left hip to rule out occult femoral neck fracture, however comparison w/ previous imaging grossly unchanged & CT pelvis does not demonstrate appreciable fx.  -Patient admitted to medicine - c/w management per primary team  -Multimodal Analgesia - recommend low dose opioids and acetaminophen as tolerated  -DVT ppx - per primary team  -PT/OT  -Ice and elevate as tolerated  -Recommend Ca & Vit D supplementation  -Recommend DEXA scan/Osteoporosis workup outpatient and treatment as needed  -Recommend follow up w/ outpatient endocrinologist   -No acute orthopaedic surgical intervention indicated at this time  -Orthopaedically stable   -Recommend follow up w/ Dr. Smith outpatient within 1 week. Call office to schedule appointment.  -All Patient's and Family Member's questions answered. Patient/family understand and agree w/ plan.  -Imaging and clinical presentation discussed w/ Dr. Smith who is aware and agrees w/ above plan. A/P:  Patient is a 89y demented Female w/ grossly unstable left shoulder dislocation    -L shoulder in sling and maintained abducted w/ folded blankets.   -FU gunslinger brace - ordered   -FU xray left shoulder in gunslinger brace when delivered   -NWB LUE, WBAT BLLE  -Patient likely unable tolerate mri of left hip to rule out occult femoral neck fracture, however comparison w/ previous imaging grossly unchanged & CT pelvis does not demonstrate appreciable fx.  -Patient admitted to medicine - c/w management per primary team  -PSx consulted for degloving injury  -Multimodal Analgesia - recommend low dose opioids and acetaminophen as tolerated  -DVT ppx - per primary team  -PT/OT  -Ice and elevate as tolerated  -Recommend Ca & Vit D supplementation  -Recommend DEXA scan/Osteoporosis workup outpatient and treatment as needed  -Recommend follow up w/ outpatient endocrinologist   -No acute orthopaedic surgical intervention indicated at this time  -Orthopaedically stable   -Recommend follow up w/ Dr. Smith outpatient within 1 week. Call office to schedule appointment.  -All Patient's and Family Member's questions answered. Patient/family understand and agree w/ plan.  -Imaging and clinical presentation discussed w/ Dr. Smith who is aware and agrees w/ above plan. A/P:  Patient is a 89y demented Female w/ grossly unstable left shoulder dislocation    -L shoulder in sling and maintained abducted w/ folded blankets.   -FU gunslinger brace - ordered   -FU xray left shoulder in gunslinger brace when delivered   -NWB LUE, WBAT BLLE  -Patient likely unable tolerate mri of left hip to rule out occult femoral neck fracture, however comparison w/ previous imaging grossly unchanged & CT pelvis does not demonstrate appreciable fx, low suspicion for hip fx at this time.  -Patient admitted to medicine - c/w management per primary team  -PSx consulted for degloving injury  -Multimodal Analgesia - recommend low dose opioids and acetaminophen as tolerated  -DVT ppx - per primary team  -PT/OT  -Ice and elevate as tolerated  -Recommend Ca & Vit D supplementation  -Recommend DEXA scan/Osteoporosis workup outpatient and treatment as needed  -Recommend follow up w/ outpatient endocrinologist   -No acute orthopaedic surgical intervention indicated at this time  -Orthopaedically stable   -Recommend follow up w/ Dr. Smith outpatient within 1 week. Call office to schedule appointment.  -All Patient's and Family Member's questions answered. Patient/family understand and agree w/ plan.  -Imaging and clinical presentation discussed w/ Dr. Smith who is aware and agrees w/ above plan.

## 2020-06-25 NOTE — ED ADULT NURSE NOTE - CHPI ED NUR SYMPTOMS NEG
no tingling/no vomiting/no fever/no bleeding/no confusion/no loss of consciousness/no numbness/no abrasion/no weakness/no deformity

## 2020-06-25 NOTE — ED PROVIDER NOTE - CARE PLAN
Principal Discharge DX:	Shoulder dislocation Principal Discharge DX:	Shoulder dislocation  Goal:	left shoulder. gait instability. possible L hip fracture. fall from standing versus syncope. frailty. mild agitation

## 2020-06-26 DIAGNOSIS — M48.00 SPINAL STENOSIS, SITE UNSPECIFIED: ICD-10-CM

## 2020-06-26 DIAGNOSIS — S43.006A UNSPECIFIED DISLOCATION OF UNSPECIFIED SHOULDER JOINT, INITIAL ENCOUNTER: ICD-10-CM

## 2020-06-26 DIAGNOSIS — Z02.9 ENCOUNTER FOR ADMINISTRATIVE EXAMINATIONS, UNSPECIFIED: ICD-10-CM

## 2020-06-26 DIAGNOSIS — I25.10 ATHEROSCLEROTIC HEART DISEASE OF NATIVE CORONARY ARTERY WITHOUT ANGINA PECTORIS: ICD-10-CM

## 2020-06-26 DIAGNOSIS — I50.30 UNSPECIFIED DIASTOLIC (CONGESTIVE) HEART FAILURE: ICD-10-CM

## 2020-06-26 DIAGNOSIS — R55 SYNCOPE AND COLLAPSE: ICD-10-CM

## 2020-06-26 DIAGNOSIS — M62.82 RHABDOMYOLYSIS: ICD-10-CM

## 2020-06-26 LAB
ANION GAP SERPL CALC-SCNC: 9 MMOL/L — SIGNIFICANT CHANGE UP (ref 5–17)
BUN SERPL-MCNC: 24 MG/DL — HIGH (ref 7–23)
CALCIUM SERPL-MCNC: 8.9 MG/DL — SIGNIFICANT CHANGE UP (ref 8.4–10.5)
CHLORIDE SERPL-SCNC: 106 MMOL/L — SIGNIFICANT CHANGE UP (ref 96–108)
CO2 SERPL-SCNC: 26 MMOL/L — SIGNIFICANT CHANGE UP (ref 22–31)
CREAT SERPL-MCNC: 0.77 MG/DL — SIGNIFICANT CHANGE UP (ref 0.5–1.3)
GLUCOSE SERPL-MCNC: 108 MG/DL — HIGH (ref 70–99)
MAGNESIUM SERPL-MCNC: 2.3 MG/DL — SIGNIFICANT CHANGE UP (ref 1.6–2.6)
PHOSPHATE SERPL-MCNC: 3.8 MG/DL — SIGNIFICANT CHANGE UP (ref 2.5–4.5)
POTASSIUM SERPL-MCNC: 4.1 MMOL/L — SIGNIFICANT CHANGE UP (ref 3.5–5.3)
POTASSIUM SERPL-SCNC: 4.1 MMOL/L — SIGNIFICANT CHANGE UP (ref 3.5–5.3)
SODIUM SERPL-SCNC: 141 MMOL/L — SIGNIFICANT CHANGE UP (ref 135–145)

## 2020-06-26 PROCEDURE — 99223 1ST HOSP IP/OBS HIGH 75: CPT | Mod: GC

## 2020-06-26 PROCEDURE — 76377 3D RENDER W/INTRP POSTPROCES: CPT | Mod: 26

## 2020-06-26 PROCEDURE — 93306 TTE W/DOPPLER COMPLETE: CPT | Mod: 26

## 2020-06-26 PROCEDURE — 73020 X-RAY EXAM OF SHOULDER: CPT | Mod: 26,LT

## 2020-06-26 PROCEDURE — 73200 CT UPPER EXTREMITY W/O DYE: CPT | Mod: 26,50

## 2020-06-26 PROCEDURE — 12345: CPT | Mod: NC

## 2020-06-26 RX ORDER — ATORVASTATIN CALCIUM 80 MG/1
1 TABLET, FILM COATED ORAL
Qty: 0 | Refills: 0 | DISCHARGE

## 2020-06-26 RX ORDER — CALCIUM CARBONATE 500(1250)
1 TABLET ORAL DAILY
Refills: 0 | Status: DISCONTINUED | OUTPATIENT
Start: 2020-06-26 | End: 2020-07-02

## 2020-06-26 RX ORDER — SODIUM CHLORIDE 9 MG/ML
1000 INJECTION INTRAMUSCULAR; INTRAVENOUS; SUBCUTANEOUS
Refills: 0 | Status: DISCONTINUED | OUTPATIENT
Start: 2020-06-26 | End: 2020-06-27

## 2020-06-26 RX ORDER — FUROSEMIDE 40 MG
1 TABLET ORAL
Qty: 0 | Refills: 0 | DISCHARGE

## 2020-06-26 RX ORDER — ALBUTEROL 90 UG/1
0.5 AEROSOL, METERED ORAL
Qty: 0 | Refills: 0 | DISCHARGE

## 2020-06-26 RX ORDER — ACETAMINOPHEN 500 MG
650 TABLET ORAL EVERY 6 HOURS
Refills: 0 | Status: DISCONTINUED | OUTPATIENT
Start: 2020-06-26 | End: 2020-06-29

## 2020-06-26 RX ORDER — ALBUTEROL 90 UG/1
2 AEROSOL, METERED ORAL EVERY 6 HOURS
Refills: 0 | Status: DISCONTINUED | OUTPATIENT
Start: 2020-06-26 | End: 2020-07-02

## 2020-06-26 RX ORDER — FERROUS SULFATE 325(65) MG
1 TABLET ORAL
Qty: 0 | Refills: 0 | DISCHARGE

## 2020-06-26 RX ORDER — ENOXAPARIN SODIUM 100 MG/ML
40 INJECTION SUBCUTANEOUS DAILY
Refills: 0 | Status: DISCONTINUED | OUTPATIENT
Start: 2020-06-26 | End: 2020-07-02

## 2020-06-26 RX ORDER — ATORVASTATIN CALCIUM 80 MG/1
40 TABLET, FILM COATED ORAL AT BEDTIME
Refills: 0 | Status: DISCONTINUED | OUTPATIENT
Start: 2020-06-26 | End: 2020-07-02

## 2020-06-26 RX ORDER — LANOLIN ALCOHOL/MO/W.PET/CERES
5 CREAM (GRAM) TOPICAL AT BEDTIME
Refills: 0 | Status: DISCONTINUED | OUTPATIENT
Start: 2020-06-26 | End: 2020-07-02

## 2020-06-26 RX ADMIN — Medication 5 MILLIGRAM(S): at 21:08

## 2020-06-26 RX ADMIN — ATORVASTATIN CALCIUM 40 MILLIGRAM(S): 80 TABLET, FILM COATED ORAL at 21:08

## 2020-06-26 RX ADMIN — Medication 650 MILLIGRAM(S): at 14:54

## 2020-06-26 RX ADMIN — ENOXAPARIN SODIUM 40 MILLIGRAM(S): 100 INJECTION SUBCUTANEOUS at 05:17

## 2020-06-26 RX ADMIN — Medication 1 TABLET(S): at 14:54

## 2020-06-26 RX ADMIN — SODIUM CHLORIDE 100 MILLILITER(S): 9 INJECTION INTRAMUSCULAR; INTRAVENOUS; SUBCUTANEOUS at 06:28

## 2020-06-26 NOTE — OCCUPATIONAL THERAPY INITIAL EVALUATION ADULT - ANTICIPATED DISCHARGE DISPOSITION, OT EVAL
anticipate discharge to Sub-acute rehab, however if pt discharged home pt would require home OT for LUE ROM and strengthing, home safety assessment, and assist for ADLs and functional mobility/rehabilitation facility

## 2020-06-26 NOTE — PROGRESS NOTE ADULT - SUBJECTIVE AND OBJECTIVE BOX
Plastic Surgery Progress Note    Subjective: seen on rounds, appears upset and continues to repeat that she doesn't want blood taken from her anymore    Exam:    Neuro: Awake, confused  GA: well-appearing  Pulm: breathing comfortably  LUE: area of degloving over the dorsal forearm and continuing volarly, skin with questionable viability, dressed and wrap reapplied    Vital Signs Last 24 Hrs  T(C): 36.7 (2020 04:52), Max: 37.2 (2020 13:20)  T(F): 98.1 (2020 04:52), Max: 99 (2020 13:20)  HR: 78 (2020 04:52) (75 - 89)  BP: 146/65 (2020 04:52) (115/49 - 176/89)  BP(mean): --  RR: 20 (2020 04:52) (17 - 20)  SpO2: 95% (2020 04:52) (92% - 97%)    I&O's Detail    2020 07:01  -  2020 07:00  --------------------------------------------------------  IN:  Total IN: 0 mL    OUT:    Intermittent Catheterization - Urethral: 425 mL  Total OUT: 425 mL    Total NET: -425 mL      MEDICATIONS  (STANDING):  atorvastatin 40 milliGRAM(s) Oral at bedtime  enoxaparin Injectable 40 milliGRAM(s) SubCutaneous daily  melatonin 5 milliGRAM(s) Oral at bedtime  sodium chloride 0.9%. 1000 milliLiter(s) (100 mL/Hr) IV Continuous <Continuous>    MEDICATIONS  (PRN):  acetaminophen   Tablet .. 650 milliGRAM(s) Oral every 6 hours PRN Temp greater or equal to 38C (100.4F), Mild Pain (1 - 3)      LABS:                        9.5    13.14 )-----------( 311      ( 2020 13:19 )             30.7     06-26    141  |  106  |  24<H>  ----------------------------<  108<H>  4.1   |  26  |  0.77    Ca    8.9      2020 07:12  Phos  3.8     -  Mg     2.3         TPro  6.1  /  Alb  3.7  /  TBili  0.3  /  DBili  x   /  AST  36  /  ALT  18  /  AlkPhos  88      PT/INR - ( 2020 13:19 )   PT: 11.1 sec;   INR: 0.97 ratio         PTT - ( 2020 13:19 )  PTT:25.1 sec  LIVER FUNCTIONS - ( 2020 13:19 )  Alb: 3.7 g/dL / Pro: 6.1 g/dL / ALK PHOS: 88 U/L / ALT: 18 U/L / AST: 36 U/L / GGT: x           Urinalysis Basic - ( 2020 16:20 )    Color: Light Yellow / Appearance: Clear / S.038 / pH: x  Gluc: x / Ketone: Negative  / Bili: Negative / Urobili: Negative   Blood: x / Protein: 30 mg/dL / Nitrite: Negative   Leuk Esterase: Negative / RBC: 3 /hpf / WBC 1 /HPF   Sq Epi: x / Non Sq Epi: 3 /hpf / Bacteria: Negative      ABO Interpretation: O (20 @ 13:09)

## 2020-06-26 NOTE — CHART NOTE - NSCHARTNOTEFT_GEN_A_CORE
Chula was put into a ARC shoulder brace for now. We will have to get her a true Gunslinger brace and will try to have it for tomorrow. Once we have it we will call ortho and look to fit it.     SATURNINO Wang  [facility]  [phone]

## 2020-06-26 NOTE — PHYSICAL THERAPY INITIAL EVALUATION ADULT - PRECAUTIONS/LIMITATIONS, REHAB EVAL
CT pelvis does not demonstrate appreciable fx, low suspicion for hip fx at this time. X-ray of L shoulder showing no acute fx at this time. Pt LUE, NWB following severe dislocation and relocation, BLE WBAT fall precautions/CT pelvis does not demonstrate appreciable fx, low suspicion for hip fx at this time. X-ray of L shoulder showing no acute fx at this time. Pt LUE, NWB following severe dislocation and relocation, BLE WBAT

## 2020-06-26 NOTE — PROGRESS NOTE ADULT - SUBJECTIVE AND OBJECTIVE BOX
Orthopaedic Surgery Progress Note    Pt seen and examined at bedside. Pt history and exam limited by mental status. No acute overnight events.    Labs:                        9.5    13.14 )-----------( 311      ( 2020 13:19 )             30.7         138  |  104  |  35<H>  ----------------------------<  137<H>  4.2   |  23  |  0.90    Ca    9.2      2020 13:19  Phos  3.5       Mg     2.2         TPro  6.1  /  Alb  3.7  /  TBili  0.3  /  DBili  x   /  AST  36  /  ALT  18  /  AlkPhos  88      PT/INR - ( 2020 13:19 )   PT: 11.1 sec;   INR: 0.97 ratio         PTT - ( 2020 13:19 )  PTT:25.1 sec  Urinalysis Basic - ( 2020 16:20 )    Color: Light Yellow / Appearance: Clear / S.038 / pH: x  Gluc: x / Ketone: Negative  / Bili: Negative / Urobili: Negative   Blood: x / Protein: 30 mg/dL / Nitrite: Negative   Leuk Esterase: Negative / RBC: 3 /hpf / WBC 1 /HPF   Sq Epi: x / Non Sq Epi: 3 /hpf / Bacteria: Negative      Vitals:  T(C): 36.7 (20 @ 04:52), Max: 37.2 (20 @ 13:20)  HR: 78 (20 @ 04:52) (75 - 89)  BP: 146/65 (20 @ 04:52) (115/49 - 176/89)  RR: 20 (20 @ 04:52) (17 - 20)  SpO2: 95% (20 @ 04:52) (92% - 97%)    Physical Exam:  Gen: NAD, resting comfortably    LUE:  Dressing clean, dry, intact  +AIN/PIN/Med/Rad/Uln  SILT C5-T1  2+ Rad/Uln Pulse  Compartments soft and compressible  No calf TTP B/L

## 2020-06-26 NOTE — PHYSICAL THERAPY INITIAL EVALUATION ADULT - DISCHARGE DISPOSITION, PT EVAL
Subacute rehab however if & pt & pt's family wants home, will need home PT and assist in all aspects of mobility & function

## 2020-06-26 NOTE — PROGRESS NOTE ADULT - PROBLEM SELECTOR PLAN 4
TTE 2019 mild diastolic dysfunction. BNP elevated 1535 while previously in 600s. Pitting edema bilaterally. Lungs clear.  -holding home Lasix 20mg in setting of rhabdo  if orthostatic negative, will restart lasix.

## 2020-06-26 NOTE — H&P ADULT - ASSESSMENT
89 y/o F PMHx R hip fracture s/p IMN (2019), CAD s/p CABGx3 >20 years ago (not on ASA due to hx of GIB), HFpEF,, mild intermittent asthma presents with unwitnessed fall with Left shoulder dislocation admitted for syncope workup s/p L shoulder reduction and degloving injury, complicated with rhabdo

## 2020-06-26 NOTE — H&P ADULT - HISTORY OF PRESENT ILLNESS
89 y/o F PMHx R hip fracture s/p IMN on 6/25/19, CAD s/p CABGx3 >20 years ago (not on ASA due to hx of GIB), HFpEF, HTN, mild intermittent asthma presents with syncope. Recent hospitalization 5/25-6/1 for mechanical fall with xray right hip negative for fracture and treated for UTI. History obtained from daughter. Aid found patient on the kitchen floor conscious calling for help at 7:30am with no clothes on, refriegorator opened. Unclear if patient had LOC or how long she was down for. No witness seizures or urinary incontinence. During breakfast, daughter noticed patient's left arm did not look right and could not raise it. Patient endorsed chest pain and pain on the left side where she fell, prompting daughter to take her to the ED. At baseline, patient is AOx2 with dementia. She sundowns are night with visual and aduitory hallucinations. She is ambulatory with a walker and lives alone with help from an aid during the day.     Vitals temp 98.3, HR 84bpm, /49, 20RR, 92%  s/p 500 LR bolus, morphine x2, haldolx1    Labs notable for WBC 13.14, CK 1058, probnp 1535  EKG: NSR with Qtc 490, bifascicular block (unchanged)  TTE 2019: EF 60-65%, mild/mod aortic regurg, mild diastolic dysfunction.   Xray: Left shoulder dislocation with severe glenohumeral arthrosis. Concerns for Left prox femur acute nondisplaced subcapital fracture 89 y/o F PMHx R hip fracture s/p IMN on 6/25/19, CAD s/p CABGx3 >20 years ago (not on ASA due to hx of GIB), HFpEF, HTN, dementia (baseline AAOx2), mild intermittent asthma presents with syncope. Recent hospitalization 5/25-6/1 for mechanical fall with xray right hip negative for fracture and treated for UTI. Pt does not recall events leading to hospitalization and baseline poor historian - history obtained from daughter. Per dtr, pt has daytime aid but stays by herself overnight at home. This morning, aid found patient on the kitchen floor, conscious and  calling for help at approx 7:30am with no clothes on, with refrigerator door opened. Unclear if patient had LOC or how long she was down for. No e/o of urinary or fecal incontinence. Per aid, pt was not complaining of anything at the time, so deferred bringing pt to ED. However, during breakfast, daughter noticed patient's left arm did not look right and noted she could not raise it. On questioning, patient then endorsed chest pain and pain on the left side where she fell, prompting daughter to take her to the ED. At baseline, patient is AOx2 with dementia. She sundowns are night with visual and aduitory hallucinations. She is ambulatory with a walker and lives alone with help from an aid during the day.     Vitals temp 98.3, HR 84bpm, /49, 20RR, 92%  s/p 500 LR bolus, morphine x2, haldolx1    Labs notable for WBC 13.14, CK 1058, probnp 1535  EKG: NSR with Qtc 490, bifascicular block (unchanged)  TTE 2019: EF 60-65%, mild/mod aortic regurg, mild diastolic dysfunction.   Xray: Left shoulder dislocation with severe glenohumeral arthrosis. Concerns for Left prox femur acute nondisplaced subcapital fracture

## 2020-06-26 NOTE — PHYSICAL THERAPY INITIAL EVALUATION ADULT - ADDITIONAL COMMENTS
Per conversation with pt's daughter (Evy [phone]), pt lives alone in a private house, has a home health aide during the day and the pt is alone during the night. Pt ambulates using a RW PTA, uses rollator for outdoors; owns wheelchair and commode.

## 2020-06-26 NOTE — H&P ADULT - NSHPSOCIALHISTORY_GEN_ALL_CORE
Lives alone with help of an aid 7days a week. Ambulatory with a walker but hx of multiple falls  No hx smoking. No alcohol use. No recreational drug use.

## 2020-06-26 NOTE — OCCUPATIONAL THERAPY INITIAL EVALUATION ADULT - RANGE OF MOTION EXAMINATION, UPPER EXTREMITY
Right UE Active ROM was WFL (within functional limits)/L shoulder not tested due to pain and dislocation, L wrist and digits WFL

## 2020-06-26 NOTE — OCCUPATIONAL THERAPY INITIAL EVALUATION ADULT - STRENGTHENING, PT EVAL
Patient will increase strength in BUE by 1/2 grade in two weeks to facilitate increased ability to perform ADLs and functional mobility.

## 2020-06-26 NOTE — OCCUPATIONAL THERAPY INITIAL EVALUATION ADULT - PERTINENT HX OF CURRENT PROBLEM, REHAB EVAL
89 y/o F PMHx R hip fracture s/p IMN (2019), CAD s/p CABGx3 >20 years ago (not on ASA due to hx of GIB), HFpEF,, mild intermittent asthma presents with unwitnessed fall with Left shoulder dislocation admitted for syncope workup s/p L shoulder reduction and degloving injury, complicated with rhabdo.

## 2020-06-26 NOTE — PHYSICAL THERAPY INITIAL EVALUATION ADULT - PERTINENT HX OF CURRENT PROBLEM, REHAB EVAL
Pt is a 89 y/o F PMHx R hip fracture s/p IMN (2019), CAD s/p CABGx3 >20 years ago (not on ASA due to hx of GIB), HFpEF,, mild intermittent asthma presents with unwitnessed fall with Left shoulder dislocation admitted for syncope workup s/p L shoulder reduction and degloving injury, complicated with rhabdo. 89F PMHx R hip fracture s/p IMN (2019), CAD s/p CABGx3 >20 years ago (not on ASA due to hx of GIB), HFpEF,, mild intermittent asthma presents with unwitnessed fall with Left shoulder dislocation admitted for syncope workup s/p L shoulder reduction and degloving injury, complicated with rhabdo.

## 2020-06-26 NOTE — H&P ADULT - PROBLEM SELECTOR PLAN 5
hx of bypass >20 years ago. Not on ASA due to GIB hx  -cw atorvastatin 40mg hx of bypass >20 years ago. Not on ASA due to GIB hx  -c/w atorvastatin 40mg

## 2020-06-26 NOTE — OCCUPATIONAL THERAPY INITIAL EVALUATION ADULT - DIAGNOSIS, OT EVAL
Patient presents with decreased balance, strength, endurance, ROM and cognition impacting ability to perform ADLs and functional mobility.

## 2020-06-26 NOTE — H&P ADULT - PROBLEM SELECTOR PLAN 1
Unwitnessed fall. Recently hospitalized for fall in May 2020.   Likely mechanical (vs orthostatic hypotension vs cardiac vs neurologic)  -f/u TTE (last TTE 2019 demonstrated mild diastolic dysfunction with EF 65%)  -check orthostatics   -TSH, folate, B12 wnl   -monitor on telemetry recurrent unwitnessed fall with recent hospitalization for fall in May 2020.   Likely mechanical but cannot r/o syncope 2/2 orthostatic hypotension vs vasovagal vs cardiac vs neurologic  - delta troponin flat, EKG showing bifascicular block (unchanged from prior)  -f/u TTE (last TTE 2019 demonstrated mild diastolic dysfunction with EF 65%)  -check orthostatics   -TSH, folate, B12 wnl   -monitor on telemetry x 24hrs

## 2020-06-26 NOTE — H&P ADULT - ATTENDING COMMENTS
Pt seen and examined. 89F with PMH CAD s/p CABGx3, HFpEF, dementia p/w unwitnessed fall; found on floor and unclear how long pt was down, noted with decreased L arm ROM and pain; in ED found to be L shoulder dislocation and rhabdomyolysis. L arm dislocation reduced by ortho, c/b degloving injury of L arm, currently dressed and plastics consulted by ED; ortho recs appreciated, will c/w pain control and NWB for now, PT/OT. Also noted with rhabdo with elevated CK >1000, s/p 500cc in ED; pt mildly overloaded on exam, will need to be judicious with IVF and will hold lasix for now.     Patient assigned to me by night hospitalist in charge for management and care for patient for this evening only. Care to be resumed by day hospitalist in the morning and thereafter.

## 2020-06-26 NOTE — H&P ADULT - PROBLEM SELECTOR PLAN 2
Xray: Severe glenohumeral arthrosis. Left shoulder dislocation s/p reduced but complicated with degloving injury  -Ortho recs appreciated  -outpatient ortho follow up Dr. Smith  -JESSICA JOHNSON Xray: Severe glenohumeral arthrosis. Left shoulder dislocation s/p reductions but complicated with degloving injury  -Ortho recs appreciated  -outpatient ortho follow up Dr. Smith  -JESSICA JOHNSON BLLE  - pain control with tylenol for now; if needed, can add low dose opioids

## 2020-06-26 NOTE — OCCUPATIONAL THERAPY INITIAL EVALUATION ADULT - LIVES WITH, PROFILE
children/OT called pt's daughter Evy ([phone]) to determine exact PLOF and discharge goals, but daughter unavailable. Per chart, pt has HHA daily from 8am-6pm who assists w/ ADLs and functional mobility, pt owns and sues rolling walker at baseline.

## 2020-06-26 NOTE — H&P ADULT - NSHPPHYSICALEXAM_GEN_ALL_CORE
PHYSICAL EXAM  GENERAL: NAD, lying comfortably in bed on room air  HEENT:  Atraumatic, Normocephalic, EOMI b/l, PERRLA b/l, neck brace  CHEST/LUNG: Clear to auscultation bilaterally; No wheeze or ronchi  HEART: Regular rate and rhythm; S1 and S2 present, No murmurs, rubs, or gallops  ABDOMEN: Soft, Nontender, Nondistended; Bowel sounds present  EXTREMITIES:  2+ Peripheral Pulses, No clubbing, cyanosis. 2+ pitting edema LE bilaterally with erythema. Left arm dressing cdi and in sling  NEURO: AAOx0, non-focal.   SKIN: No additional rashes or lesions Vital Signs Last 24 Hrs  T(C): 36.7 (26 Jun 2020 04:52), Max: 37.2 (25 Jun 2020 13:20)  T(F): 98.1 (26 Jun 2020 04:52), Max: 99 (25 Jun 2020 13:20)  HR: 78 (26 Jun 2020 04:52) (75 - 89)  BP: 146/65 (26 Jun 2020 04:52) (115/49 - 176/89)  BP(mean): --  RR: 20 (26 Jun 2020 04:52) (17 - 20)  SpO2: 95% (26 Jun 2020 04:52) (92% - 97%)    PHYSICAL EXAM  GENERAL: NAD, lying comfortably in bed on room air  HEAD:  Atraumatic, Normocephalic,  EYES: EOMI b/l, PERRLA b/l  ENT/NECK: neck brace in place  CHEST/LUNG: Clear to auscultation bilaterally; No wheeze or ronchi  HEART: Regular rate and rhythm; S1 and S2 present, No murmurs, rubs, or gallops  ABDOMEN: Soft, Nontender, Nondistended; Bowel sounds present  EXTREMITIES:  2+ Peripheral Pulses, No clubbing, cyanosis. 2+ pitting edema LE bilaterally with erythema. Left arm dressing with minimal oozing  NEURO: AAOx2 (name and place), able to follow commands and answer simple questions   SKIN: L skin tear with dressing as above, no other rashes

## 2020-06-26 NOTE — PHYSICAL THERAPY INITIAL EVALUATION ADULT - GENERAL OBSERVATIONS, REHAB EVAL
Received in bed, +open wound in L forearm covered with surgical gauze, +IVL, +ext female catheter; denies pain, agreeable to MERY harris donmari with assist

## 2020-06-26 NOTE — H&P ADULT - PROBLEM SELECTOR PLAN 4
TTE 2019 mild diastolic dysfunction. BNP elevated 1535 while previously in 600s. Pitting edema bilaterally. Lungs clear.  -repeat TTE  -hold home Lasix 20mg until rhabdo resolves

## 2020-06-26 NOTE — PROGRESS NOTE ADULT - SUBJECTIVE AND OBJECTIVE BOX
patient seen and examined. no acute complaints. denies pain. states she uses albuterol inhaler at home when intermittently sob.     PHYSICAL EXAM:  GENERAL: NAD, breathing normal  HEAD:  Atraumatic, Normocephalic  EYES: conjunctiva and sclera clear  NECK: supple, No JVD  CHEST/LUNG: decrease bs, no wheezing or crackles appreciated  HEART: S1 S2 RRR  ABDOMEN: +BS Soft, NT/ND  EXTREMITIES:  2+ DP Pulses, No c/c. No LE edema  NEUROLOGY: AAOx3, no facial droop, moving all extremities  SKIN: soft tissue swelling at left shoulder, left arm in sling patient seen and examined. no acute complaints. denies pain. states she uses albuterol inhaler at home when intermittently sob.     PHYSICAL EXAM:  GENERAL: NAD, breathing normal  HEAD:  Atraumatic, Normocephalic  EYES: conjunctiva and sclera clear  NECK: supple, No JVD  CHEST/LUNG: decrease bs, no wheezing or crackles appreciated  HEART: S1 S2 RRR  ABDOMEN: +BS Soft, NT/ND  EXTREMITIES:  2+ DP Pulses, No c/c. No LE edema  NEUROLOGY: AAOx2+, no facial droop, moving all extremities  SKIN: soft tissue swelling at left shoulder, left arm in sling

## 2020-06-26 NOTE — H&P ADULT - NSHPLABSRESULTS_GEN_ALL_CORE
9.5    13.14 )-----------( 311      ( 2020 13:19 )             30.7       -25    138  |  104  |  35<H>  ----------------------------<  137<H>  4.2   |  23  |  0.90    Ca    9.2      2020 13:19  Phos  3.5       Mg     2.2         TPro  6.1  /  Alb  3.7  /  TBili  0.3  /  DBili  x   /  AST  36  /  ALT  18  /  AlkPhos  88                Urinalysis Basic - ( 2020 16:20 )    Color: Light Yellow / Appearance: Clear / S.038 / pH: x  Gluc: x / Ketone: Negative  / Bili: Negative / Urobili: Negative   Blood: x / Protein: 30 mg/dL / Nitrite: Negative   Leuk Esterase: Negative / RBC: 3 /hpf / WBC 1 /HPF   Sq Epi: x / Non Sq Epi: 3 /hpf / Bacteria: Negative        PT/INR - ( 2020 13:19 )   PT: 11.1 sec;   INR: 0.97 ratio         PTT - ( 2020 13:19 )  PTT:25.1 sec    Lactate Trend      CARDIAC MARKERS ( 2020 16:40 )  x     / x     / 1019 U/L / x     / 22.7 ng/mL  CARDIAC MARKERS ( 2020 13:19 )  x     / x     / 1058 U/L / x     / 25.8 ng/mL        CAPILLARY BLOOD GLUCOSE      POCT Blood Glucose.: 151 mg/dL (2020 13:23)    < from: Xray Shoulder Axillary View, Left (20 @ 19:04) >      EXAM:  SHOULDER AXILLARY VIEW LEFT                          EXAM:  SHOULDER AXILLARY VIEW LEFT                            PROCEDURE DATE:  2020            INTERPRETATION:  INDICATION: Shoulder pain    COMPARISON: Shoulder radiographs dated 2020    FINDING: Multiple images of the shoulder were obtained. Images time from 6:14 PM to 6:45 PM demonstrates dislocation of the shoulder. There is interval relocation of the shoulder at the radiograph timed 6:51 PM. There is severe glenohumeraljoint space narrowing. Acromioclavicular joint appears intact. No definite fracture is appreciated.    IMPRESSION:   Shoulder dislocation series with final image demonstrating shoulder relocation.  Severe glenohumeral arthrosis.    < end of copied text > Labs, imaging and EKG personally reviewed and interpreted by me. EKG sinus with bifascicular block, ; unchanged from prior EKG 2020.                9.5    13.14 )-----------( 311      ( 2020 13:19 )             30.7       25    138  |  104  |  35<H>  ----------------------------<  137<H>  4.2   |  23  |  0.90    Ca    9.2      2020 13:19  Phos  3.5       Mg     2.2         TPro  6.1  /  Alb  3.7  /  TBili  0.3  /  DBili  x   /  AST  36  /  ALT  18  /  AlkPhos  88                Urinalysis Basic - ( 2020 16:20 )    Color: Light Yellow / Appearance: Clear / S.038 / pH: x  Gluc: x / Ketone: Negative  / Bili: Negative / Urobili: Negative   Blood: x / Protein: 30 mg/dL / Nitrite: Negative   Leuk Esterase: Negative / RBC: 3 /hpf / WBC 1 /HPF   Sq Epi: x / Non Sq Epi: 3 /hpf / Bacteria: Negative        PT/INR - ( 2020 13:19 )   PT: 11.1 sec;   INR: 0.97 ratio         PTT - ( 2020 13:19 )  PTT:25.1 sec    Lactate Trend      CARDIAC MARKERS ( 2020 16:40 )  x     / x     / 1019 U/L / x     / 22.7 ng/mL  CARDIAC MARKERS ( 2020 13:19 )  x     / x     / 1058 U/L / x     / 25.8 ng/mL        CAPILLARY BLOOD GLUCOSE      POCT Blood Glucose.: 151 mg/dL (2020 13:23)    < from: Xray Shoulder Axillary View, Left (20 @ 19:04) >      EXAM:  SHOULDER AXILLARY VIEW LEFT                          EXAM:  SHOULDER AXILLARY VIEW LEFT                            PROCEDURE DATE:  2020            INTERPRETATION:  INDICATION: Shoulder pain    COMPARISON: Shoulder radiographs dated 2020    FINDING: Multiple images of the shoulder were obtained. Images time from 6:14 PM to 6:45 PM demonstrates dislocation of the shoulder. There is interval relocation of the shoulder at the radiograph timed 6:51 PM. There is severe glenohumeraljoint space narrowing. Acromioclavicular joint appears intact. No definite fracture is appreciated.    IMPRESSION:   Shoulder dislocation series with final image demonstrating shoulder relocation.  Severe glenohumeral arthrosis.    < end of copied text >

## 2020-06-26 NOTE — H&P ADULT - PROBLEM SELECTOR PLAN 7
DVT: lovenox  Diet: DASH  Dispo: PT OT eval    Transitions of Care Status:  1.  Name of PCP:  2.  PCP Contacted on Admission: [ ] Y    [ ] N    3.  PCP contacted at Discharge: [ ] Y    [ ] N    [ ] N/A  4.  Post-Discharge Appointment Date and Location:  5.  Summary of Handoff given to PCP:

## 2020-06-26 NOTE — H&P ADULT - NSHPREVIEWOFSYSTEMS_GEN_ALL_CORE
Review of Systems:  Constitutional: No fever, No weight loss, good appetite/po intake  HEENT: +blurry vision from cataracts, No diplopia  Neuro: No tremors, No muscle weakness   Cardiovascular: + chest pain, No palpitations  Respiratory: No SOB, No cough  GI: No nausea, No vomiting, No diarrhea  : No dysuria, No hematuria  Skin: No rash. No lesions   MSK: limited ROM of left arm. No joint pains  Psych: No depression, no changes in mood

## 2020-06-26 NOTE — OCCUPATIONAL THERAPY INITIAL EVALUATION ADULT - ADDITIONAL COMMENTS
CT abdomen/pelvis 6/25: No sequela of acute trauma in the chest, abdomen, or pelvis. CT HEAD 6/25: No evidence of acute transcortical infarct, acute intracranial hemorrhage or mass effect. CT C/T SPINE: Loss of height of the T10 vertebral body with some sclerosis along the superior endplate suggests compression fracture age indeterminate. No priors are available for comparison.  Xray L elbow: No acute fracture. Xray L femur: No fracture is seen in the left femur. No osseous lesion is seen. There are overlying surgical clips.  Xray shoudler: Shoulder dislocation series with final image demonstrating shoulder relocation. Severe glenohumeral arthrosis.

## 2020-06-26 NOTE — H&P ADULT - PROBLEM SELECTOR PLAN 3
initial CK 1058. Likely from fall. Unclear length of time patient was down. s/p 500 IVF bolus  -hold off fluids given hx of HFpEF  -trend CK daily initial CK 1058. Likely from fall. Unclear length of time patient was down. s/p 500 IVF bolus  -hold off fluids given hx of HFpEF  -trend CK AM. If uptrending, will give 250cc bolus initial CK 1058. Likely from fall. Unclear length of time patient was down. s/p 500 IVF bolus  -hold off fluids given hx of HFpEF and mild volume overload on exam   -trend CK AM. If uptrending, will give 500cc bolus  - encourage PO intake

## 2020-06-27 LAB
-  AMIKACIN: SIGNIFICANT CHANGE UP
-  AMPICILLIN/SULBACTAM: SIGNIFICANT CHANGE UP
-  AMPICILLIN: SIGNIFICANT CHANGE UP
-  AZTREONAM: SIGNIFICANT CHANGE UP
-  CEFAZOLIN: SIGNIFICANT CHANGE UP
-  CEFEPIME: SIGNIFICANT CHANGE UP
-  CEFOXITIN: SIGNIFICANT CHANGE UP
-  CEFTRIAXONE: SIGNIFICANT CHANGE UP
-  CIPROFLOXACIN: SIGNIFICANT CHANGE UP
-  GENTAMICIN: SIGNIFICANT CHANGE UP
-  IMIPENEM: SIGNIFICANT CHANGE UP
-  LEVOFLOXACIN: SIGNIFICANT CHANGE UP
-  MEROPENEM: SIGNIFICANT CHANGE UP
-  NITROFURANTOIN: SIGNIFICANT CHANGE UP
-  PIPERACILLIN/TAZOBACTAM: SIGNIFICANT CHANGE UP
-  TIGECYCLINE: SIGNIFICANT CHANGE UP
-  TOBRAMYCIN: SIGNIFICANT CHANGE UP
-  TRIMETHOPRIM/SULFAMETHOXAZOLE: SIGNIFICANT CHANGE UP
CK SERPL-CCNC: 213 U/L — HIGH (ref 25–170)
CULTURE RESULTS: SIGNIFICANT CHANGE UP
HCT VFR BLD CALC: 28 % — LOW (ref 34.5–45)
HGB BLD-MCNC: 8.7 G/DL — LOW (ref 11.5–15.5)
MCHC RBC-ENTMCNC: 29.5 PG — SIGNIFICANT CHANGE UP (ref 27–34)
MCHC RBC-ENTMCNC: 31.1 GM/DL — LOW (ref 32–36)
MCV RBC AUTO: 94.9 FL — SIGNIFICANT CHANGE UP (ref 80–100)
METHOD TYPE: SIGNIFICANT CHANGE UP
NRBC # BLD: 0 /100 WBCS — SIGNIFICANT CHANGE UP (ref 0–0)
NT-PROBNP SERPL-SCNC: 1364 PG/ML — HIGH (ref 0–300)
ORGANISM # SPEC MICROSCOPIC CNT: SIGNIFICANT CHANGE UP
ORGANISM # SPEC MICROSCOPIC CNT: SIGNIFICANT CHANGE UP
PLATELET # BLD AUTO: 271 K/UL — SIGNIFICANT CHANGE UP (ref 150–400)
RBC # BLD: 2.95 M/UL — LOW (ref 3.8–5.2)
RBC # FLD: 16 % — HIGH (ref 10.3–14.5)
SPECIMEN SOURCE: SIGNIFICANT CHANGE UP
WBC # BLD: 9.17 K/UL — SIGNIFICANT CHANGE UP (ref 3.8–10.5)
WBC # FLD AUTO: 9.17 K/UL — SIGNIFICANT CHANGE UP (ref 3.8–10.5)

## 2020-06-27 PROCEDURE — 99232 SBSQ HOSP IP/OBS MODERATE 35: CPT

## 2020-06-27 RX ORDER — LIDOCAINE 4 G/100G
1 CREAM TOPICAL EVERY 24 HOURS
Refills: 0 | Status: DISCONTINUED | OUTPATIENT
Start: 2020-06-27 | End: 2020-07-02

## 2020-06-27 RX ORDER — LIDOCAINE 4 G/100G
1 CREAM TOPICAL ONCE
Refills: 0 | Status: COMPLETED | OUTPATIENT
Start: 2020-06-27 | End: 2020-06-27

## 2020-06-27 RX ADMIN — ENOXAPARIN SODIUM 40 MILLIGRAM(S): 100 INJECTION SUBCUTANEOUS at 05:46

## 2020-06-27 RX ADMIN — LIDOCAINE 1 PATCH: 4 CREAM TOPICAL at 11:48

## 2020-06-27 RX ADMIN — Medication 1 TABLET(S): at 09:35

## 2020-06-27 RX ADMIN — LIDOCAINE 1 PATCH: 4 CREAM TOPICAL at 23:00

## 2020-06-27 RX ADMIN — LIDOCAINE 1 PATCH: 4 CREAM TOPICAL at 19:00

## 2020-06-27 NOTE — PROGRESS NOTE ADULT - SUBJECTIVE AND OBJECTIVE BOX
Patient is a 89y old  Female who presents with a chief complaint of fall, L shoulder pain (2020 13:38)        SUBJECTIVE / OVERNIGHT EVENTS: c/o left shoulder pain      MEDICATIONS  (STANDING):  atorvastatin 40 milliGRAM(s) Oral at bedtime  calcium carbonate   1250 mG (OsCal) 1 Tablet(s) Oral daily  enoxaparin Injectable 40 milliGRAM(s) SubCutaneous daily  melatonin 5 milliGRAM(s) Oral at bedtime  sodium chloride 0.9%. 1000 milliLiter(s) (100 mL/Hr) IV Continuous <Continuous>    MEDICATIONS  (PRN):  acetaminophen   Tablet .. 650 milliGRAM(s) Oral every 6 hours PRN Temp greater or equal to 38C (100.4F), Mild Pain (1 - 3)  ALBUTerol    90 MICROgram(s) HFA Inhaler 2 Puff(s) Inhalation every 6 hours PRN Shortness of Breath and/or Wheezing      Vital Signs Last 24 Hrs  T(C): 36.6 (2020 11:29), Max: 36.6 (2020 05:13)  T(F): 97.8 (2020 11:29), Max: 97.8 (2020 05:13)  HR: 79 (2020 05:13) (79 - 82)  BP: 138/67 (2020 05:13) (138/67 - 165/68)  BP(mean): --  RR: 18 (2020 11:29) (18 - 18)  SpO2: 92% (2020 11:29) (92% - 95%)  CAPILLARY BLOOD GLUCOSE        I&O's Summary    2020 07:  -  2020 07:00  --------------------------------------------------------  IN: 770 mL / OUT: 350 mL / NET: 420 mL    2020 07:  -  2020 15:31  --------------------------------------------------------  IN: 240 mL / OUT: 200 mL / NET: 40 mL        PHYSICAL EXAM:  GENERAL: NAD, breathing normal  HEAD:  Atraumatic, Normocephalic  EYES: conjunctiva and sclera clear  NECK: supple, No JVD  CHEST/LUNG: decrease bs, no wheezing or crackles appreciated  HEART: S1 S2 RRR  ABDOMEN: +BS Soft, NT/ND  EXTREMITIES:  2+ DP Pulses, No c/c. No LE edema  NEUROLOGY: AAOx2+, no facial droop, moving all extremities  SKIN: soft tissue swelling at left shoulder, left arm in sling    LABS:                        8.7    9.17  )-----------( 271      ( 2020 06:56 )             28.0     06-26    141  |  106  |  24<H>  ----------------------------<  108<H>  4.1   |  26  |  0.77    Ca    8.9      2020 07:12  Phos  3.8     06-26  Mg     2.3     06-26        CARDIAC MARKERS ( 2020 06:56 )  x     / x     / 213 U/L / x     / x      CARDIAC MARKERS ( 2020 07:12 )  x     / x     / 580 U/L / x     / x      CARDIAC MARKERS ( 2020 16:40 )  x     / x     / 1019 U/L / x     / 22.7 ng/mL      Urinalysis Basic - ( 2020 16:20 )    Color: Light Yellow / Appearance: Clear / S.038 / pH: x  Gluc: x / Ketone: Negative  / Bili: Negative / Urobili: Negative   Blood: x / Protein: 30 mg/dL / Nitrite: Negative   Leuk Esterase: Negative / RBC: 3 /hpf / WBC 1 /HPF   Sq Epi: x / Non Sq Epi: 3 /hpf / Bacteria: Negative        RADIOLOGY & ADDITIONAL TESTS:    Imaging Personally Reviewed:  Consultant(s) Notes Reviewed:    Care Discussed with Consultants/Other Providers:

## 2020-06-28 LAB
BLD GP AB SCN SERPL QL: NEGATIVE — SIGNIFICANT CHANGE UP
HCT VFR BLD CALC: 29.7 % — LOW (ref 34.5–45)
HGB BLD-MCNC: 9.3 G/DL — LOW (ref 11.5–15.5)
MCHC RBC-ENTMCNC: 30 PG — SIGNIFICANT CHANGE UP (ref 27–34)
MCHC RBC-ENTMCNC: 31.3 GM/DL — LOW (ref 32–36)
MCV RBC AUTO: 95.8 FL — SIGNIFICANT CHANGE UP (ref 80–100)
NRBC # BLD: 0 /100 WBCS — SIGNIFICANT CHANGE UP (ref 0–0)
PLATELET # BLD AUTO: 299 K/UL — SIGNIFICANT CHANGE UP (ref 150–400)
RBC # BLD: 3.1 M/UL — LOW (ref 3.8–5.2)
RBC # FLD: 15.9 % — HIGH (ref 10.3–14.5)
RH IG SCN BLD-IMP: POSITIVE — SIGNIFICANT CHANGE UP
WBC # BLD: 9.66 K/UL — SIGNIFICANT CHANGE UP (ref 3.8–10.5)
WBC # FLD AUTO: 9.66 K/UL — SIGNIFICANT CHANGE UP (ref 3.8–10.5)

## 2020-06-28 PROCEDURE — 99232 SBSQ HOSP IP/OBS MODERATE 35: CPT

## 2020-06-28 RX ORDER — AMLODIPINE BESYLATE 2.5 MG/1
5 TABLET ORAL DAILY
Refills: 0 | Status: DISCONTINUED | OUTPATIENT
Start: 2020-06-28 | End: 2020-07-02

## 2020-06-28 RX ADMIN — Medication 1 TABLET(S): at 09:00

## 2020-06-28 RX ADMIN — AMLODIPINE BESYLATE 5 MILLIGRAM(S): 2.5 TABLET ORAL at 06:51

## 2020-06-28 RX ADMIN — LIDOCAINE 1 PATCH: 4 CREAM TOPICAL at 21:13

## 2020-06-28 RX ADMIN — ENOXAPARIN SODIUM 40 MILLIGRAM(S): 100 INJECTION SUBCUTANEOUS at 08:59

## 2020-06-28 NOTE — PROGRESS NOTE ADULT - SUBJECTIVE AND OBJECTIVE BOX
Patient is a 89y old  Female who presents with a chief complaint of fall, L shoulder pain (27 Jun 2020 15:28)        SUBJECTIVE / OVERNIGHT EVENTS: no acute complaints       MEDICATIONS  (STANDING):  amLODIPine   Tablet 5 milliGRAM(s) Oral daily  atorvastatin 40 milliGRAM(s) Oral at bedtime  calcium carbonate   1250 mG (OsCal) 1 Tablet(s) Oral daily  enoxaparin Injectable 40 milliGRAM(s) SubCutaneous daily  lidocaine   Patch 1 Patch Transdermal every 24 hours  melatonin 5 milliGRAM(s) Oral at bedtime    MEDICATIONS  (PRN):  acetaminophen   Tablet .. 650 milliGRAM(s) Oral every 6 hours PRN Temp greater or equal to 38C (100.4F), Mild Pain (1 - 3)  ALBUTerol    90 MICROgram(s) HFA Inhaler 2 Puff(s) Inhalation every 6 hours PRN Shortness of Breath and/or Wheezing      Vital Signs Last 24 Hrs  T(C): 36.5 (28 Jun 2020 17:44), Max: 36.6 (28 Jun 2020 04:22)  T(F): 97.7 (28 Jun 2020 17:44), Max: 97.8 (28 Jun 2020 04:22)  HR: 74 (28 Jun 2020 17:44) (63 - 78)  BP: 154/71 (28 Jun 2020 17:44) (154/71 - 185/76)  BP(mean): --  RR: 18 (28 Jun 2020 17:44) (18 - 18)  SpO2: 96% (28 Jun 2020 17:44) (95% - 96%)  CAPILLARY BLOOD GLUCOSE        I&O's Summary    27 Jun 2020 07:01  -  28 Jun 2020 07:00  --------------------------------------------------------  IN: 240 mL / OUT: 500 mL / NET: -260 mL    28 Jun 2020 07:01  -  28 Jun 2020 19:36  --------------------------------------------------------  IN: 360 mL / OUT: 0 mL / NET: 360 mL        PHYSICAL EXAM:  GENERAL: NAD, breathing normal  HEAD:  Atraumatic, Normocephalic  EYES: conjunctiva and sclera clear  NECK: supple, No JVD  CHEST/LUNG: decrease bs, no wheezing or crackles appreciated  HEART: S1 S2 RRR  ABDOMEN: +BS Soft, NT/ND  EXTREMITIES:  2+ DP Pulses, No c/c. No LE edema  NEUROLOGY: AAOx2+, no facial droop, moving all extremities  SKIN: soft tissue swelling at left shoulder, left arm in sling    LABS:                        9.3    9.66  )-----------( 299      ( 28 Jun 2020 06:48 )             29.7             CARDIAC MARKERS ( 27 Jun 2020 06:56 )  x     / x     / 213 U/L / x     / x              RADIOLOGY & ADDITIONAL TESTS:    Imaging Personally Reviewed:  Consultant(s) Notes Reviewed:    Care Discussed with Consultants/Other Providers:

## 2020-06-29 DIAGNOSIS — I10 ESSENTIAL (PRIMARY) HYPERTENSION: ICD-10-CM

## 2020-06-29 LAB
HCT VFR BLD CALC: 32.3 % — LOW (ref 34.5–45)
HGB BLD-MCNC: 10.3 G/DL — LOW (ref 11.5–15.5)
MCHC RBC-ENTMCNC: 29.9 PG — SIGNIFICANT CHANGE UP (ref 27–34)
MCHC RBC-ENTMCNC: 31.9 GM/DL — LOW (ref 32–36)
MCV RBC AUTO: 93.9 FL — SIGNIFICANT CHANGE UP (ref 80–100)
NRBC # BLD: 0 /100 WBCS — SIGNIFICANT CHANGE UP (ref 0–0)
PLATELET # BLD AUTO: 308 K/UL — SIGNIFICANT CHANGE UP (ref 150–400)
RBC # BLD: 3.44 M/UL — LOW (ref 3.8–5.2)
RBC # FLD: 15.4 % — HIGH (ref 10.3–14.5)
WBC # BLD: 7.58 K/UL — SIGNIFICANT CHANGE UP (ref 3.8–10.5)
WBC # FLD AUTO: 7.58 K/UL — SIGNIFICANT CHANGE UP (ref 3.8–10.5)

## 2020-06-29 PROCEDURE — 99232 SBSQ HOSP IP/OBS MODERATE 35: CPT

## 2020-06-29 RX ORDER — HALOPERIDOL DECANOATE 100 MG/ML
1 INJECTION INTRAMUSCULAR ONCE
Refills: 0 | Status: COMPLETED | OUTPATIENT
Start: 2020-06-29 | End: 2020-06-29

## 2020-06-29 RX ORDER — ACETAMINOPHEN 500 MG
975 TABLET ORAL EVERY 6 HOURS
Refills: 0 | Status: DISCONTINUED | OUTPATIENT
Start: 2020-06-29 | End: 2020-07-02

## 2020-06-29 RX ADMIN — LIDOCAINE 1 PATCH: 4 CREAM TOPICAL at 09:30

## 2020-06-29 RX ADMIN — LIDOCAINE 1 PATCH: 4 CREAM TOPICAL at 07:00

## 2020-06-29 RX ADMIN — ENOXAPARIN SODIUM 40 MILLIGRAM(S): 100 INJECTION SUBCUTANEOUS at 09:24

## 2020-06-29 RX ADMIN — Medication 5 MILLIGRAM(S): at 22:37

## 2020-06-29 RX ADMIN — HALOPERIDOL DECANOATE 1 MILLIGRAM(S): 100 INJECTION INTRAMUSCULAR at 00:15

## 2020-06-29 RX ADMIN — ATORVASTATIN CALCIUM 40 MILLIGRAM(S): 80 TABLET, FILM COATED ORAL at 22:37

## 2020-06-29 RX ADMIN — AMLODIPINE BESYLATE 5 MILLIGRAM(S): 2.5 TABLET ORAL at 09:25

## 2020-06-29 RX ADMIN — LIDOCAINE 1 PATCH: 4 CREAM TOPICAL at 22:37

## 2020-06-29 RX ADMIN — Medication 1 TABLET(S): at 09:25

## 2020-06-29 RX ADMIN — Medication 650 MILLIGRAM(S): at 09:25

## 2020-06-29 NOTE — PROGRESS NOTE ADULT - SUBJECTIVE AND OBJECTIVE BOX
Plastic Surgery Progress Note    Subjective: seen on rounds, gets upset during the dressing change but can be reoriented    Exam:    Neuro: Awake, confused  GA: well-appearing  Pulm: breathing comfortably  LUE: area of degloving over the dorsal forearm and continuing volarly, skin with questionable viability and starting to adhere distally. Proximally open. Dressed and wrap reapplied    Vital Signs Last 24 Hrs  T(C): 37.4 (29 Jun 2020 05:14), Max: 37.4 (29 Jun 2020 05:14)  T(F): 99.4 (29 Jun 2020 05:14), Max: 99.4 (29 Jun 2020 05:14)  HR: 71 (29 Jun 2020 05:14) (63 - 80)  BP: 182/70 (29 Jun 2020 05:14) (154/71 - 182/70)  BP(mean): --  RR: 18 (29 Jun 2020 05:14) (18 - 18)  SpO2: 96% (29 Jun 2020 05:14) (95% - 96%)    I&O's Detail    28 Jun 2020 07:01  -  29 Jun 2020 07:00  --------------------------------------------------------  IN:    Oral Fluid: 720 mL  Total IN: 720 mL    OUT:    Voided: 800 mL  Total OUT: 800 mL    Total NET: -80 mL      MEDICATIONS  (STANDING):  amLODIPine   Tablet 5 milliGRAM(s) Oral daily  atorvastatin 40 milliGRAM(s) Oral at bedtime  calcium carbonate   1250 mG (OsCal) 1 Tablet(s) Oral daily  enoxaparin Injectable 40 milliGRAM(s) SubCutaneous daily  lidocaine   Patch 1 Patch Transdermal every 24 hours  melatonin 5 milliGRAM(s) Oral at bedtime    MEDICATIONS  (PRN):  acetaminophen   Tablet .. 650 milliGRAM(s) Oral every 6 hours PRN Temp greater or equal to 38C (100.4F), Mild Pain (1 - 3)  ALBUTerol    90 MICROgram(s) HFA Inhaler 2 Puff(s) Inhalation every 6 hours PRN Shortness of Breath and/or Wheezing      LABS:                        9.3    9.66  )-----------( 299      ( 28 Jun 2020 06:48 )             29.7

## 2020-06-29 NOTE — PROGRESS NOTE ADULT - SUBJECTIVE AND OBJECTIVE BOX
[facility] Division of Hospital Medicine  Cayla Bell MD  Pager (M-F, 8A-5P): [phone]  Other Times:  [phone]    Patient is a 89y old  Female who presents with a chief complaint of fall, L shoulder pain (29 Jun 2020 06:59)      SUBJECTIVE / OVERNIGHT EVENTS:    no overnight events.   no specific complaints today  off tele.   ate breakfast. aaox1     ADDITIONAL REVIEW OF SYSTEMS:    MEDICATIONS  (STANDING):  amLODIPine   Tablet 5 milliGRAM(s) Oral daily  atorvastatin 40 milliGRAM(s) Oral at bedtime  calcium carbonate   1250 mG (OsCal) 1 Tablet(s) Oral daily  enoxaparin Injectable 40 milliGRAM(s) SubCutaneous daily  lidocaine   Patch 1 Patch Transdermal every 24 hours  melatonin 5 milliGRAM(s) Oral at bedtime    MEDICATIONS  (PRN):  acetaminophen   Tablet .. 975 milliGRAM(s) Oral every 6 hours PRN Temp greater or equal to 38C (100.4F), Mild Pain (1 - 3)  ALBUTerol    90 MICROgram(s) HFA Inhaler 2 Puff(s) Inhalation every 6 hours PRN Shortness of Breath and/or Wheezing      CAPILLARY BLOOD GLUCOSE        I&O's Summary    28 Jun 2020 07:01  -  29 Jun 2020 07:00  --------------------------------------------------------  IN: 720 mL / OUT: 800 mL / NET: -80 mL    29 Jun 2020 07:01  -  29 Jun 2020 17:56  --------------------------------------------------------  IN: 240 mL / OUT: 1 mL / NET: 239 mL        PHYSICAL EXAM:  Vital Signs Last 24 Hrs  T(C): 36.5 (29 Jun 2020 13:24), Max: 37.4 (29 Jun 2020 05:14)  T(F): 97.7 (29 Jun 2020 13:24), Max: 99.4 (29 Jun 2020 05:14)  HR: 71 (29 Jun 2020 13:24) (71 - 80)  BP: 142/57 (29 Jun 2020 13:24) (142/57 - 182/70)  BP(mean): --  RR: 18 (29 Jun 2020 13:24) (18 - 18)  SpO2: 95% (29 Jun 2020 13:24) (95% - 96%)  GENERAL: NAD, breathing normal  HEAD:  Atraumatic, Normocephalic  EYES: conjunctiva and sclera clear  NECK: supple, No JVD  CHEST/LUNG: decrease bs, no wheezing or crackles appreciated  HEART: S1 S2 RRR  ABDOMEN: +BS Soft, NT/ND  EXTREMITIES:  2+ DP Pulses, No c/c. No LE edema  NEUROLOGY: AAOx2+, no facial droop, moving all extremities  SKIN: soft tissue swelling at left shoulder, left arm in sling      LABS:                        10.3   7.58  )-----------( 308      ( 29 Jun 2020 07:01 )             32.3                       RADIOLOGY & ADDITIONAL TESTS:  Results Reviewed: yes    COORDINATION OF CARE:  Care Discussed with Consultants/Other Providers [Y/N]: yes  Prior or Outpatient Records Reviewed [Y/N]: yes

## 2020-06-30 ENCOUNTER — TRANSCRIPTION ENCOUNTER (OUTPATIENT)
Age: 85
End: 2020-06-30

## 2020-06-30 PROCEDURE — 99232 SBSQ HOSP IP/OBS MODERATE 35: CPT

## 2020-06-30 RX ADMIN — Medication 1 TABLET(S): at 09:37

## 2020-06-30 RX ADMIN — Medication 5 MILLIGRAM(S): at 21:35

## 2020-06-30 RX ADMIN — ENOXAPARIN SODIUM 40 MILLIGRAM(S): 100 INJECTION SUBCUTANEOUS at 09:37

## 2020-06-30 RX ADMIN — LIDOCAINE 1 PATCH: 4 CREAM TOPICAL at 11:43

## 2020-06-30 RX ADMIN — LIDOCAINE 1 PATCH: 4 CREAM TOPICAL at 21:34

## 2020-06-30 RX ADMIN — LIDOCAINE 1 PATCH: 4 CREAM TOPICAL at 09:34

## 2020-06-30 RX ADMIN — ATORVASTATIN CALCIUM 40 MILLIGRAM(S): 80 TABLET, FILM COATED ORAL at 21:35

## 2020-06-30 RX ADMIN — AMLODIPINE BESYLATE 5 MILLIGRAM(S): 2.5 TABLET ORAL at 05:38

## 2020-06-30 NOTE — CHART NOTE - NSCHARTNOTEFT_GEN_A_CORE
Spoke to attending regarding wound care recommendations for discharge. Would favor wound care team assessment for dressing care and to establish follow-up. Order placed

## 2020-06-30 NOTE — DISCHARGE NOTE PROVIDER - CARE PROVIDERS DIRECT ADDRESSES
,DirectAddress_Unknown,[email or web address] ,DirectAddress_Unknown,[email or web address]

## 2020-06-30 NOTE — DISCHARGE NOTE PROVIDER - HOSPITAL COURSE
89 y/o F PMHx R hip fracture s/p IMN (2019), CAD s/p CABGx3 >20 years ago (not on ASA due to hx of GIB), HFpEF,, mild intermittent asthma presents with unwitnessed fall with Left shoulder dislocation admitted for syncope workup s/p L shoulder reduction and degloving injury, complicated with rhabdo. TTE without segmental wall motion abn EF 75%, CT head negative    Ortho and plastics consulted.     Ortho f/u regarding ct shoulder findings of anterior dislocation and possible rotator cuff tear, shoulder reduction not complete, patient placed in gunslinger brace.    Regarding subcapsular fracture of femor on xray - per ortho low suspicion for hip fx.        outpatient ortho follow up Dr. Smith 89 y/o F PMHx R hip fracture s/p IMN (2019), CAD s/p CABGx3 >20 years ago (not on ASA due to hx of GIB), HFpEF,, mild intermittent asthma presents with unwitnessed fall with Left shoulder dislocation admitted for syncope workup s/p L shoulder reduction and degloving injury, complicated with rhabdo. TTE without segmental wall motion abn EF 75%, CT head negative    Ortho and plastics consulted. Follow-up with wound care. Topical therapy: Left arm wound - cleanse with NS, pat dry, apply adaptic, cover with aquacel, then abd pads, keep in place with the sling    Change the aquacel and abd pads every three days or pRN for saturation, irrigate through the adaptic and don't remove for 6 days    2.) LUE elevation    3.) Nutrition optimization    Upon [facility] [address] [place] [address] [phone]    Ortho f/u regarding ct shoulder findings of anterior dislocation and possible rotator cuff tear, shoulder reduction not complete, patient placed in gunslinger brace.    Regarding subcapsular fracture of femor on xray - per ortho low suspicion for hip fx.        outpatient ortho follow up Dr. Smith

## 2020-06-30 NOTE — DISCHARGE NOTE PROVIDER - CARE PROVIDER_API CALL
Azucena Smith  ORTHOPAEDIC SURGERY  [address] [place] [address], [address]  [place] [address]  Phone: [phone]  Fax: [phone]  Follow Up Time: 1 week    Tsering Lou)  Geriatric Medicine; Internal Medicine  [address] [facility], [address]  [place] [address]  Phone: [phone]  Fax: [phone]  Established Patient  Follow Up Time: 2 weeks Azucena Smith  ORTHOPAEDIC SURGERY  [address] [facility], [address]  [place] [address]  Phone: [phone]  Fax: [phone]  Follow Up Time: 1 week    Tsering Lou)  Geriatric Medicine; Internal Medicine  [address] [facility], [address]  [place] [address]  Phone: [phone]  Fax: [phone]  Established Patient  Follow Up Time: 2 weeks    Owen Real  SURGERY - [address] [place] [address]  Phone: [phone]  Fax: [phone]  Follow Up Time:

## 2020-06-30 NOTE — DISCHARGE NOTE PROVIDER - PROVIDER TOKENS
PROVIDER:[TOKEN:[[id number]],FOLLOWUP:[1 week]],PROVIDER:[TOKEN:[[id number]],FOLLOWUP:[2 weeks],ESTABLISHEDPATIENT:[T]] PROVIDER:[TOKEN:[[id number]],FOLLOWUP:[1 week]],PROVIDER:[TOKEN:[[id number]],FOLLOWUP:[2 weeks],ESTABLISHEDPATIENT:[T]],PROVIDER:[TOKEN:[[id number]]]

## 2020-06-30 NOTE — PROGRESS NOTE ADULT - SUBJECTIVE AND OBJECTIVE BOX
[facility] Division of Hospital Medicine  Cayla Bell MD  Pager (M-F, 8A-5P): [phone]  Other Times:  [phone]    Patient is a 89y old  Female who presents with a chief complaint of fall, L shoulder pain (29 Jun 2020 17:56)      SUBJECTIVE / OVERNIGHT EVENTS:  ADDITIONAL REVIEW OF SYSTEMS:    MEDICATIONS  (STANDING):  amLODIPine   Tablet 5 milliGRAM(s) Oral daily  atorvastatin 40 milliGRAM(s) Oral at bedtime  calcium carbonate   1250 mG (OsCal) 1 Tablet(s) Oral daily  enoxaparin Injectable 40 milliGRAM(s) SubCutaneous daily  lidocaine   Patch 1 Patch Transdermal every 24 hours  melatonin 5 milliGRAM(s) Oral at bedtime    MEDICATIONS  (PRN):  acetaminophen   Tablet .. 975 milliGRAM(s) Oral every 6 hours PRN Temp greater or equal to 38C (100.4F), Mild Pain (1 - 3)  ALBUTerol    90 MICROgram(s) HFA Inhaler 2 Puff(s) Inhalation every 6 hours PRN Shortness of Breath and/or Wheezing      CAPILLARY BLOOD GLUCOSE        I&O's Summary    29 Jun 2020 07:01  -  30 Jun 2020 07:00  --------------------------------------------------------  IN: 480 mL / OUT: 302 mL / NET: 178 mL    30 Jun 2020 07:01  -  30 Jun 2020 19:08  --------------------------------------------------------  IN: 0 mL / OUT: 300 mL / NET: -300 mL        PHYSICAL EXAM:  Vital Signs Last 24 Hrs  T(C): 36.8 (30 Jun 2020 12:29), Max: 37.2 (30 Jun 2020 05:22)  T(F): 98.3 (30 Jun 2020 12:29), Max: 98.9 (30 Jun 2020 05:22)  HR: 77 (30 Jun 2020 12:29) (71 - 77)  BP: 139/72 (30 Jun 2020 12:29) (133/52 - 151/53)  BP(mean): --  RR: 18 (30 Jun 2020 12:29) (18 - 18)  SpO2: 97% (30 Jun 2020 12:29) (96% - 98%)  CONSTITUTIONAL: NAD, well-developed, well-groomed  EYES: PERRLA; conjunctiva and sclera clear  ENMT: Moist oral mucosa, no pharyngeal injection or exudates; normal dentition  NECK: Supple, no palpable masses; no thyromegaly  RESPIRATORY: Normal respiratory effort; lungs are clear to auscultation bilaterally  CARDIOVASCULAR: Regular rate and rhythm, normal S1 and S2, no murmur/rub/gallop; No lower extremity edema; Peripheral pulses are 2+ bilaterally  ABDOMEN: Nontender to palpation, normoactive bowel sounds, no rebound/guarding; No hepatosplenomegaly  MUSCULOSKELETAL:  Normal gait; no clubbing or cyanosis of digits; no joint swelling or tenderness to palpation  PSYCH: A+O to person, place, and time; affect appropriate  NEUROLOGY: CN 2-12 are intact and symmetric; no gross sensory deficits   SKIN: No rashes; no palpable lesions    LABS:                        10.3   7.58  )-----------( 308      ( 29 Jun 2020 07:01 )             32.3                       RADIOLOGY & ADDITIONAL TESTS:  Results Reviewed:   Imaging Personally Reviewed:  Electrocardiogram Personally Reviewed:    COORDINATION OF CARE:  Care Discussed with Consultants/Other Providers [Y/N]:  Prior or Outpatient Records Reviewed [Y/N]: [facility] Division of Hospital Medicine  Cayla Bell MD  Pager (M-F, 8A-5P): [phone]  Other Times:  [phone]    Patient is a 89y old  Female who presents with a chief complaint of fall, L shoulder pain (29 Jun 2020 17:56)      SUBJECTIVE / OVERNIGHT EVENTS:    sitting up in bed, more oriented today    ADDITIONAL REVIEW OF SYSTEMS:    MEDICATIONS  (STANDING):  amLODIPine   Tablet 5 milliGRAM(s) Oral daily  atorvastatin 40 milliGRAM(s) Oral at bedtime  calcium carbonate   1250 mG (OsCal) 1 Tablet(s) Oral daily  enoxaparin Injectable 40 milliGRAM(s) SubCutaneous daily  lidocaine   Patch 1 Patch Transdermal every 24 hours  melatonin 5 milliGRAM(s) Oral at bedtime    MEDICATIONS  (PRN):  acetaminophen   Tablet .. 975 milliGRAM(s) Oral every 6 hours PRN Temp greater or equal to 38C (100.4F), Mild Pain (1 - 3)  ALBUTerol    90 MICROgram(s) HFA Inhaler 2 Puff(s) Inhalation every 6 hours PRN Shortness of Breath and/or Wheezing      CAPILLARY BLOOD GLUCOSE        I&O's Summary    29 Jun 2020 07:01  -  30 Jun 2020 07:00  --------------------------------------------------------  IN: 480 mL / OUT: 302 mL / NET: 178 mL    30 Jun 2020 07:01  -  30 Jun 2020 19:08  --------------------------------------------------------  IN: 0 mL / OUT: 300 mL / NET: -300 mL        PHYSICAL EXAM:  Vital Signs Last 24 Hrs  T(C): 36.8 (30 Jun 2020 12:29), Max: 37.2 (30 Jun 2020 05:22)  T(F): 98.3 (30 Jun 2020 12:29), Max: 98.9 (30 Jun 2020 05:22)  HR: 77 (30 Jun 2020 12:29) (71 - 77)  BP: 139/72 (30 Jun 2020 12:29) (133/52 - 151/53)  BP(mean): --  RR: 18 (30 Jun 2020 12:29) (18 - 18)  SpO2: 97% (30 Jun 2020 12:29) (96% - 98%)  GENERAL: NAD, breathing normal  HEAD:  Atraumatic, Normocephalic  EYES: conjunctiva and sclera clear  NECK: supple, No JVD  CHEST/LUNG: decrease bs, no wheezing or crackles appreciated  HEART: S1 S2 RRR  ABDOMEN: +BS Soft, NT/ND  EXTREMITIES:  2+ DP Pulses, No c/c. No LE edema  NEUROLOGY: AAOx2+, no facial droop, moving all extremities  SKIN: soft tissue swelling at left shoulder, left arm in sling  LABS:                        10.3   7.58  )-----------( 308      ( 29 Jun 2020 07:01 )             32.3                       RADIOLOGY & ADDITIONAL TESTS:  Results Reviewed:  yes    COORDINATION OF CARE:  Care Discussed with Consultants/Other Providers [Y/N]: yes  Prior or Outpatient Records Reviewed [Y/N]: yes

## 2020-06-30 NOTE — CHART NOTE - NSCHARTNOTEFT_GEN_A_CORE
Patient seen for adjustment and application of shoulder abduction orthosis with physical therapy assist. She seems ,more comfortable and in better position now.  Contact information given. To notify office with any other issues questions or concerns.  Kalen MENON  [facility]  [phone]

## 2020-06-30 NOTE — DISCHARGE NOTE PROVIDER - NSDCCPCAREPLAN_GEN_ALL_CORE_FT
PRINCIPAL DISCHARGE DIAGNOSIS  Diagnosis: Shoulder dislocation  Assessment and Plan of Treatment: Continue to wear sling  Follow up with orthopedic 1 week after discharge      SECONDARY DISCHARGE DIAGNOSES  Diagnosis: CAD (coronary artery disease)  Assessment and Plan of Treatment: cornary artery disease (coronary artery disease)  Coronary artery disease is a condition where the arteries the supply the heart muscle get clogged with fatty deposits & puts you at risk for a heart attack.  Call your doctor if you have any new pain, pressure, or discomfort in the center of your chest, pain, tingling or discomfort in arms, back, neck, jaw, or stomach, shortness of breath, nausea, vomiting, burping or heartburn, sweating, cold and clammy skin, racing or abnormal heartbeat for more than 10 minutes or if they keep coming & going.  Call 911 and do not try to get to hospital by car.  You can help yourself with lifestyle changes (quitting smoking if you smoke), eat lots of fruits & vegetables & low fat dairy products, not a lot of meat & fatty foods, walk or some form of physical activity most days of the week, lose weight if you are overweight.  Take your cardiac medication as prescribed to lower cholesterol, to lower blood pressure, and control your blood sugar.      Diagnosis: (HFpEF) heart failure with preserved ejection fraction  Assessment and Plan of Treatment: Weigh yourself daily.  If you gain 3lbs in 3 days, or 5lbs in a week call your Health Care Provider.  Do not eat or drink foods containing more than 2000mg of salt (sodium) in your diet every day.  Call your Health Care Provider if you have any swelling or increased swelling in your feet, ankles, and/or stomach.  Take all of your medication as directed.  If you become dizzy call your Health Care Provider.      Diagnosis: Rhabdomyolysis  Assessment and Plan of Treatment: Rhabdomyolysis  Resolved with IVF    Diagnosis: HTN (hypertension)  Assessment and Plan of Treatment: HTN (hypertension)  Low salt diet  Activity as tolerated.  Take all medication as prescribed.  Follow up with your medical doctor for routine blood pressure monitoring at your next visit.  Notify your doctor if you have any of the following symptoms:   Dizziness, Lightheadedness, Blurry vision, Headache, Chest pain, Shortness of breath

## 2020-07-01 PROCEDURE — 99232 SBSQ HOSP IP/OBS MODERATE 35: CPT

## 2020-07-01 RX ORDER — FUROSEMIDE 40 MG
20 TABLET ORAL DAILY
Refills: 0 | Status: DISCONTINUED | OUTPATIENT
Start: 2020-07-01 | End: 2020-07-02

## 2020-07-01 RX ADMIN — LIDOCAINE 1 PATCH: 4 CREAM TOPICAL at 21:46

## 2020-07-01 RX ADMIN — AMLODIPINE BESYLATE 5 MILLIGRAM(S): 2.5 TABLET ORAL at 05:56

## 2020-07-01 RX ADMIN — Medication 5 MILLIGRAM(S): at 21:46

## 2020-07-01 RX ADMIN — Medication 975 MILLIGRAM(S): at 10:02

## 2020-07-01 RX ADMIN — LIDOCAINE 1 PATCH: 4 CREAM TOPICAL at 10:05

## 2020-07-01 RX ADMIN — Medication 1 TABLET(S): at 11:29

## 2020-07-01 RX ADMIN — LIDOCAINE 1 PATCH: 4 CREAM TOPICAL at 08:51

## 2020-07-01 RX ADMIN — ATORVASTATIN CALCIUM 40 MILLIGRAM(S): 80 TABLET, FILM COATED ORAL at 21:46

## 2020-07-01 RX ADMIN — ENOXAPARIN SODIUM 40 MILLIGRAM(S): 100 INJECTION SUBCUTANEOUS at 08:53

## 2020-07-01 NOTE — DIETITIAN INITIAL EVALUATION ADULT. - REASON INDICATOR FOR ASSESSMENT
Pt seen for length of stay.   Source: EMR and RN  Pertinent chart information: 89 y/o F PMHx R hip fracture s/p IMN (2019), CAD s/p CABGx3 >20 years ago (not on ASA due to hx of GIB), HFpEF,, mild intermittent asthma presents with unwitnessed fall with Left shoulder dislocation admitted for syncope workup s/p L shoulder reduction and degloving injury, complicated with rhabdo.

## 2020-07-01 NOTE — PROGRESS NOTE ADULT - SUBJECTIVE AND OBJECTIVE BOX
[facility] Division of Hospital Medicine  Cayla Bell MD  Pager (M-F, 8A-5P): [phone]  Other Times:  [phone]    Patient is a 89y old  Female who presents with a chief complaint of fall, L shoulder pain (01 Jul 2020 10:21)      SUBJECTIVE / OVERNIGHT EVENTS:      no complaints except wants to leave hospital  arm in brace    ADDITIONAL REVIEW OF SYSTEMS:    MEDICATIONS  (STANDING):  amLODIPine   Tablet 5 milliGRAM(s) Oral daily  atorvastatin 40 milliGRAM(s) Oral at bedtime  calcium carbonate   1250 mG (OsCal) 1 Tablet(s) Oral daily  enoxaparin Injectable 40 milliGRAM(s) SubCutaneous daily  lidocaine   Patch 1 Patch Transdermal every 24 hours  melatonin 5 milliGRAM(s) Oral at bedtime    MEDICATIONS  (PRN):  acetaminophen   Tablet .. 975 milliGRAM(s) Oral every 6 hours PRN Temp greater or equal to 38C (100.4F), Mild Pain (1 - 3)  ALBUTerol    90 MICROgram(s) HFA Inhaler 2 Puff(s) Inhalation every 6 hours PRN Shortness of Breath and/or Wheezing      CAPILLARY BLOOD GLUCOSE        I&O's Summary    30 Jun 2020 07:01  -  01 Jul 2020 07:00  --------------------------------------------------------  IN: 560 mL / OUT: 551 mL / NET: 9 mL    01 Jul 2020 07:01  -  01 Jul 2020 20:12  --------------------------------------------------------  IN: 1020 mL / OUT: 501 mL / NET: 519 mL        PHYSICAL EXAM:  Vital Signs Last 24 Hrs  T(C): 36.6 (01 Jul 2020 12:30), Max: 36.8 (01 Jul 2020 05:39)  T(F): 97.9 (01 Jul 2020 12:30), Max: 98.2 (01 Jul 2020 05:39)  HR: 73 (01 Jul 2020 12:30) (73 - 82)  BP: 130/70 (01 Jul 2020 12:30) (130/70 - 157/70)  BP(mean): --  RR: 18 (01 Jul 2020 12:30) (18 - 18)  SpO2: 96% (01 Jul 2020 12:30) (93% - 98%)  GENERAL: NAD, breathing normal  HEAD:  Atraumatic, Normocephalic  EYES: conjunctiva and sclera clear  NECK: supple, No JVD  CHEST/LUNG: decrease bs, no wheezing or crackles appreciated  HEART: S1 S2 RRR  ABDOMEN: +BS Soft, NT/ND  EXTREMITIES:  2+ DP Pulses, No c/c. No LE edema  NEUROLOGY: AAOx2+, no facial droop, moving all extremities  SKIN: soft tissue swelling at left shoulder, left arm in sling    LABS:                      RADIOLOGY & ADDITIONAL TESTS:  Results Reviewed:  yes  Imaging Personally Reviewed:  Electrocardiogram Personally Reviewed:    COORDINATION OF CARE:  Care Discussed with Consultants/Other Providers [Y/N]: yes  Prior or Outpatient Records Reviewed [Y/N]: yes

## 2020-07-01 NOTE — CONSULT NOTE ADULT - ATTENDING COMMENTS
right handed 89 yo female admitted following recurrent fall at home  Sustained left shoulder dislocation , s/p reduction , with degloving to left arm , which has been managed by plastics for past several days  Currently , she is a frail , awake , elderly female with a left shoulder immobilizer in place  left arm degloving wound is FT, involving both dorsal and volar aspects of left forearm, with exposed SQ viable tissue  Skin flap is not frankly necrotic, and is about 50% in contact with underlying wound  Dressings applied  Left arm and hand elevated , given edema present  Plastics F/U   For d/c to [facility]

## 2020-07-01 NOTE — CONSULT NOTE ADULT - ASSESSMENT
Impression:    Left arm degloved skin    Recommend:  1.) topical therapy: Left arm wound - cleanse with NS, pat dry, apply adaptic, cover with aquacel, then abd pads, keep in place with the sling  Change the aquacel and abd pads every three days or pRN for saturation, irrigate through the adaptic and don't remove for 6 days  2.) LUE elevation  3.) Nutrition optimization    Care as per medicine will follow w/ you  Upon discharge [facility] at [facility] [address] [place] [address] [phone]  Seen with Dr. Real and discussed with clinical nurse  Thank you for this consult  Danelle Bryan, NP-C, CWOCN [id number]

## 2020-07-01 NOTE — DIETITIAN INITIAL EVALUATION ADULT. - OTHER INFO
Pt not appropriate for interview at this time due to current mental status. Per chart, pt noted to be A&Ox2 at baseline. Limited information available PTA. Interview deferred to RN who reports pt with fair appetite; consuming ~50% of her meals. Pt requires minimal assist with meals. RN denies any chewing/swallowing difficulty on current diet. NKFA noted per chart. RN denies any acute GI issues at this time. Last BM 6/30 per flowsheet. Weight fluctuations noted in-house likely due to fluid shifts.     Diet education no appropriate at this time.

## 2020-07-01 NOTE — DIETITIAN INITIAL EVALUATION ADULT. - PHYSICAL APPEARANCE
well nourished/other (specify) Ht: none noted, Wt: 134lbs, BMI: kg/m2  Edema: none noted   Skin per nursing flowsheets: stage 1 sacral ulcer

## 2020-07-01 NOTE — DIETITIAN INITIAL EVALUATION ADULT. - ADD RECOMMEND
1) Continue to encourage po intake and obtain/honor food preferences as able. 2) RD to provide diet Mighty Shakes (200kcal, 7g protein/serving) to promote adequate intake. 3) Provide assistance with meals as needed. 4) Monitor PO intake, diet tolerance, weight trends, labs, GI function, and skin integrity.

## 2020-07-01 NOTE — PROGRESS NOTE ADULT - PROBLEM SELECTOR PLAN 4
TTE 2019 mild diastolic dysfunction. BNP elevated 1535 while previously in 600s. Pitting edema bilaterally. Lungs clear.  -will resume lasix 20mg daily tomorrow

## 2020-07-01 NOTE — DIETITIAN INITIAL EVALUATION ADULT. - PROBLEM SELECTOR PLAN 1
recurrent unwitnessed fall with recent hospitalization for fall in May 2020.   Likely mechanical but cannot r/o syncope 2/2 orthostatic hypotension vs vasovagal vs cardiac vs neurologic  - delta troponin flat, EKG showing bifascicular block (unchanged from prior)  -f/u TTE (last TTE 2019 demonstrated mild diastolic dysfunction with EF 65%)  -check orthostatics   -TSH, folate, B12 wnl   -monitor on telemetry x 24hrs

## 2020-07-01 NOTE — DIETITIAN INITIAL EVALUATION ADULT. - PERTINENT MEDS FT
MEDICATIONS  (STANDING):  amLODIPine   Tablet 5 milliGRAM(s) Oral daily  atorvastatin 40 milliGRAM(s) Oral at bedtime  calcium carbonate   1250 mG (OsCal) 1 Tablet(s) Oral daily  enoxaparin Injectable 40 milliGRAM(s) SubCutaneous daily  lidocaine   Patch 1 Patch Transdermal every 24 hours  melatonin 5 milliGRAM(s) Oral at bedtime    MEDICATIONS  (PRN):  acetaminophen   Tablet .. 975 milliGRAM(s) Oral every 6 hours PRN Temp greater or equal to 38C (100.4F), Mild Pain (1 - 3)  ALBUTerol    90 MICROgram(s) HFA Inhaler 2 Puff(s) Inhalation every 6 hours PRN Shortness of Breath and/or Wheezing

## 2020-07-01 NOTE — DIETITIAN INITIAL EVALUATION ADULT. - PROBLEM SELECTOR PLAN 2
Xray: Severe glenohumeral arthrosis. Left shoulder dislocation s/p reductions but complicated with degloving injury  -Ortho recs appreciated  -outpatient ortho follow up Dr. Smith  -JESSICA JOHNSON BLLE  - pain control with tylenol for now; if needed, can add low dose opioids

## 2020-07-01 NOTE — CONSULT NOTE ADULT - SUBJECTIVE AND OBJECTIVE BOX
Wound Surgery Consult Note:    HPI:  89 y/o F PMHx R hip fracture s/p IMN on 6/25/19, CAD s/p CABGx3 >20 years ago (not on ASA due to hx of GIB), HFpEF, HTN, dementia (baseline AAOx2), mild intermittent asthma presents with syncope. Recent hospitalization 5/25-6/1 for mechanical fall with xray right hip negative for fracture and treated for UTI. Pt does not recall events leading to hospitalization and baseline poor historian - history obtained from daughter. Per dtr, pt has daytime aid but stays by herself overnight at home. This morning, aid found patient on the kitchen floor, conscious and  calling for help at approx 7:30am with no clothes on, with refrigerator door opened. Unclear if patient had LOC or how long she was down for. No e/o of urinary or fecal incontinence. Per aid, pt was not complaining of anything at the time, so deferred bringing pt to ED. However, during breakfast, daughter noticed patient's left arm did not look right and noted she could not raise it. On questioning, patient then endorsed chest pain and pain on the left side where she fell, prompting daughter to take her to the ED. At baseline, patient is AOx2 with dementia. She sundowns are night with visual and aduitory hallucinations. She is ambulatory with a walker and lives alone with help from an aid during the day.     Vitals temp 98.3, HR 84bpm, /49, 20RR, 92%  s/p 500 LR bolus, morphine x2, haldolx1    Labs notable for WBC 13.14, CK 1058, probnp 1535  EKG: NSR with Qtc 490, bifascicular block (unchanged)  TTE 2019: EF 60-65%, mild/mod aortic regurg, mild diastolic dysfunction.   Xray: Left shoulder dislocation with severe glenohumeral arthrosis. Concerns for Left prox femur acute nondisplaced subcapital fracture (26 Jun 2020 02:44)    Request for wound care consult for Left upper arm wound received. Ms. Silva was encountered on an alternating air with low air loss surface. She was seen with Dr. Real and expressed pain and discomfort with removal and reapplication of the Left arm dressing. Clinical nurse at the bedside during visit and patient medicated for pain.    PAST MEDICAL & SURGICAL HISTORY:  (HFpEF) heart failure with preserved ejection fraction  Spinal stenosis  Peptic ulcer disease  HLD (hyperlipidemia)  CAD (coronary artery disease)  Status post hip surgery    REVIEW OF SYSTEMS  General:	no fever or chills  Respiratory and Thorax: no SOB or cough  Cardiovascular:	CAD, CHF  Gastrointestinal:	no n/v  Genitourinary:	no incontinence  Musculoskeletal:	 ambulates, multiple recent falls, s/p hip surgery, Left shoulder dislocation  Neurological:	dementia  Vascular: no chronic swelling  skin: no chronic wounds    MEDICATIONS  (STANDING):  amLODIPine   Tablet 5 milliGRAM(s) Oral daily  atorvastatin 40 milliGRAM(s) Oral at bedtime  calcium carbonate   1250 mG (OsCal) 1 Tablet(s) Oral daily  enoxaparin Injectable 40 milliGRAM(s) SubCutaneous daily  lidocaine   Patch 1 Patch Transdermal every 24 hours  melatonin 5 milliGRAM(s) Oral at bedtime    MEDICATIONS  (PRN):  acetaminophen   Tablet .. 975 milliGRAM(s) Oral every 6 hours PRN Temp greater or equal to 38C (100.4F), Mild Pain (1 - 3)  ALBUTerol    90 MICROgram(s) HFA Inhaler 2 Puff(s) Inhalation every 6 hours PRN Shortness of Breath and/or Wheezing    Allergies    aspirin (Other)    Intolerances    SOCIAL HISTORY:  lives alone with daytime aid    FAMILY HISTORY:  No pertinent family history in first degree relatives    Vital Signs Last 24 Hrs  T(C): 36.8 (01 Jul 2020 05:39), Max: 36.8 (30 Jun 2020 12:29)  T(F): 98.2 (01 Jul 2020 05:39), Max: 98.3 (30 Jun 2020 12:29)  HR: 78 (01 Jul 2020 05:39) (77 - 82)  BP: 157/70 (01 Jul 2020 05:39) (136/67 - 157/70)  BP(mean): --  RR: 18 (01 Jul 2020 05:39) (18 - 18)  SpO2: 98% (01 Jul 2020 05:39) (93% - 98%)    Physical Exam:  General: frail, appears weak  Respiratory: no SOB on room air  Gastrointestinal: soft NT/ND  Neurology: weakened strength & sensation grossly intact  Musculoskeletal: Left arm in a sling, no contractures  Vascular: L arm/hand swelling, radial pulses palpable, BUE equally warm  Skin:  Left upper arm wound with 50% covered with skin - L 10cm x 10cm x D 0.2cm with 50% wound with exposed subcutaneous tissue, skin is bunched at the distal part of wound, skin flap appears viable with very faint duskiness only, moderation serosanguinous drainage, +TTP  No odor, erythema, increased warmth, induration, fluctuance    LABS:                 10.3   7.58  )-----------( 308      ( 29 Jun 2020 07:01 )             32.3       RADIOLOGY & ADDITIONAL STUDIES:    EXAM:  CT SHOULDER ONLY BI                        EXAM:  CT 3D RECONSTRUCT W ALLI                        PROCEDURE DATE:  06/26/2020    INTERPRETATION:    CT of the bilateral shoulders without contrast.    CLINICAL INFORMATION: Left shoulder dislocation.  TECHNIQUE: Axial CT images were obtained of the bilateral shoulders with coronal and sagittal reconstructions. No contrast was administered. Three dimensional reconstructions were obtained .  COMPARISONS:  Left shoulder radiographs 6/25/2020.    FINDINGS:    Diffuse osseous demineralization limits evaluation.    Right shoulder:  No acute displaced fracture or dislocation is seen in the right shoulder. Arthritic changes of the glenohumeral joint. Small ossific density at the posterior aspect of the glenohumeral joint, possible joint body. Small ossific/calcific densities inferior to the coracoid, possible joint bodies. There is likely fluid in the subcoracoid bursa. The right humeral head is high riding, suggesting rotator cuff tear.      Left shoulder:  There is anterior dislocation of the left humeral head relative to the glenoid. The humeral head is in contact with the anterior glenoid. Small 7 mm ossific density at the medial aspect of the left humeral head. Nonspecific small ossific density along the anterior medial aspect of the scapula near the margin of the anterior glenoid, measuring 5 mm. Soft tissue swelling about the left shoulder.     Atherosclerotic calcifications of the thoracic aorta. Postsurgical changes in the chest with median sternotomy wires. Scarring in bilateral lung apices.   Three-dimensional reformatted images redemonstrate the above osseous findings.    IMPRESSION:    Anterior dislocation of the left humeral head relative to the glenoid. The humeral head is in contact with the anterior glenoid, obscuring the anterior glenoid margin. Small 7 mm ossific density at the medial aspect of the left humeral head is nonspecific, question avulsed fragment or degenerative. Nonspecific small ossific density along the anterior medial aspect of the left scapula near the margin of the anterior glenoid, measuring 5 mm, question avulsed fragment or degenerative. Soft tissue swelling about the left shoulder.     Arthritic changes of the right glenohumeral joint with high riding humeral head suggesting rotator cuff tear. No acute displaced fracture or dislocation of the right shoulder.

## 2020-07-02 ENCOUNTER — TRANSCRIPTION ENCOUNTER (OUTPATIENT)
Age: 85
End: 2020-07-02

## 2020-07-02 VITALS
RESPIRATION RATE: 18 BRPM | DIASTOLIC BLOOD PRESSURE: 72 MMHG | TEMPERATURE: 98 F | HEART RATE: 70 BPM | OXYGEN SATURATION: 97 % | SYSTOLIC BLOOD PRESSURE: 148 MMHG

## 2020-07-02 PROCEDURE — 87086 URINE CULTURE/COLONY COUNT: CPT

## 2020-07-02 PROCEDURE — 96376 TX/PRO/DX INJ SAME DRUG ADON: CPT | Mod: XU

## 2020-07-02 PROCEDURE — 99285 EMERGENCY DEPT VISIT HI MDM: CPT | Mod: 25

## 2020-07-02 PROCEDURE — 80048 BASIC METABOLIC PNL TOTAL CA: CPT

## 2020-07-02 PROCEDURE — 93306 TTE W/DOPPLER COMPLETE: CPT

## 2020-07-02 PROCEDURE — 80053 COMPREHEN METABOLIC PANEL: CPT

## 2020-07-02 PROCEDURE — 73552 X-RAY EXAM OF FEMUR 2/>: CPT

## 2020-07-02 PROCEDURE — 73200 CT UPPER EXTREMITY W/O DYE: CPT

## 2020-07-02 PROCEDURE — 73502 X-RAY EXAM HIP UNI 2-3 VIEWS: CPT

## 2020-07-02 PROCEDURE — 73110 X-RAY EXAM OF WRIST: CPT

## 2020-07-02 PROCEDURE — 82746 ASSAY OF FOLIC ACID SERUM: CPT

## 2020-07-02 PROCEDURE — 99239 HOSP IP/OBS DSCHRG MGMT >30: CPT

## 2020-07-02 PROCEDURE — 84443 ASSAY THYROID STIM HORMONE: CPT

## 2020-07-02 PROCEDURE — 82803 BLOOD GASES ANY COMBINATION: CPT

## 2020-07-02 PROCEDURE — 87186 SC STD MICRODIL/AGAR DIL: CPT

## 2020-07-02 PROCEDURE — 82435 ASSAY OF BLOOD CHLORIDE: CPT

## 2020-07-02 PROCEDURE — 97116 GAIT TRAINING THERAPY: CPT

## 2020-07-02 PROCEDURE — 83605 ASSAY OF LACTIC ACID: CPT

## 2020-07-02 PROCEDURE — 73020 X-RAY EXAM OF SHOULDER: CPT

## 2020-07-02 PROCEDURE — 82553 CREATINE MB FRACTION: CPT

## 2020-07-02 PROCEDURE — 73120 X-RAY EXAM OF HAND: CPT

## 2020-07-02 PROCEDURE — 84100 ASSAY OF PHOSPHORUS: CPT

## 2020-07-02 PROCEDURE — 74177 CT ABD & PELVIS W/CONTRAST: CPT

## 2020-07-02 PROCEDURE — 97166 OT EVAL MOD COMPLEX 45 MIN: CPT

## 2020-07-02 PROCEDURE — 93005 ELECTROCARDIOGRAM TRACING: CPT

## 2020-07-02 PROCEDURE — 96361 HYDRATE IV INFUSION ADD-ON: CPT

## 2020-07-02 PROCEDURE — 82947 ASSAY GLUCOSE BLOOD QUANT: CPT

## 2020-07-02 PROCEDURE — 84484 ASSAY OF TROPONIN QUANT: CPT

## 2020-07-02 PROCEDURE — 82607 VITAMIN B-12: CPT

## 2020-07-02 PROCEDURE — 86850 RBC ANTIBODY SCREEN: CPT

## 2020-07-02 PROCEDURE — 83735 ASSAY OF MAGNESIUM: CPT

## 2020-07-02 PROCEDURE — 73070 X-RAY EXAM OF ELBOW: CPT

## 2020-07-02 PROCEDURE — 70450 CT HEAD/BRAIN W/O DYE: CPT

## 2020-07-02 PROCEDURE — 86900 BLOOD TYPING SEROLOGIC ABO: CPT

## 2020-07-02 PROCEDURE — 82330 ASSAY OF CALCIUM: CPT

## 2020-07-02 PROCEDURE — 73060 X-RAY EXAM OF HUMERUS: CPT

## 2020-07-02 PROCEDURE — 82962 GLUCOSE BLOOD TEST: CPT

## 2020-07-02 PROCEDURE — 73090 X-RAY EXAM OF FOREARM: CPT

## 2020-07-02 PROCEDURE — 86901 BLOOD TYPING SEROLOGIC RH(D): CPT

## 2020-07-02 PROCEDURE — 73030 X-RAY EXAM OF SHOULDER: CPT

## 2020-07-02 PROCEDURE — 96374 THER/PROPH/DIAG INJ IV PUSH: CPT | Mod: XU

## 2020-07-02 PROCEDURE — 71260 CT THORAX DX C+: CPT

## 2020-07-02 PROCEDURE — 85730 THROMBOPLASTIN TIME PARTIAL: CPT

## 2020-07-02 PROCEDURE — 83880 ASSAY OF NATRIURETIC PEPTIDE: CPT

## 2020-07-02 PROCEDURE — 85014 HEMATOCRIT: CPT

## 2020-07-02 PROCEDURE — 85027 COMPLETE CBC AUTOMATED: CPT

## 2020-07-02 PROCEDURE — 76377 3D RENDER W/INTRP POSTPROCES: CPT

## 2020-07-02 PROCEDURE — 84295 ASSAY OF SERUM SODIUM: CPT

## 2020-07-02 PROCEDURE — 73080 X-RAY EXAM OF ELBOW: CPT

## 2020-07-02 PROCEDURE — 83690 ASSAY OF LIPASE: CPT

## 2020-07-02 PROCEDURE — 85610 PROTHROMBIN TIME: CPT

## 2020-07-02 PROCEDURE — 71045 X-RAY EXAM CHEST 1 VIEW: CPT

## 2020-07-02 PROCEDURE — 97162 PT EVAL MOD COMPLEX 30 MIN: CPT

## 2020-07-02 PROCEDURE — 96375 TX/PRO/DX INJ NEW DRUG ADDON: CPT | Mod: XU

## 2020-07-02 PROCEDURE — 84132 ASSAY OF SERUM POTASSIUM: CPT

## 2020-07-02 PROCEDURE — 72125 CT NECK SPINE W/O DYE: CPT

## 2020-07-02 PROCEDURE — 82550 ASSAY OF CK (CPK): CPT

## 2020-07-02 PROCEDURE — 97530 THERAPEUTIC ACTIVITIES: CPT

## 2020-07-02 PROCEDURE — 81001 URINALYSIS AUTO W/SCOPE: CPT

## 2020-07-02 RX ORDER — SUCRALFATE 1 G
10 TABLET ORAL
Qty: 0 | Refills: 0 | DISCHARGE

## 2020-07-02 RX ORDER — LANOLIN ALCOHOL/MO/W.PET/CERES
1 CREAM (GRAM) TOPICAL
Qty: 0 | Refills: 0 | DISCHARGE
Start: 2020-07-02

## 2020-07-02 RX ORDER — CHOLECALCIFEROL (VITAMIN D3) 125 MCG
25 CAPSULE ORAL
Qty: 30 | Refills: 0
Start: 2020-07-02 | End: 2020-07-31

## 2020-07-02 RX ORDER — CALCIUM CARBONATE 500(1250)
1 TABLET ORAL
Qty: 0 | Refills: 0 | DISCHARGE
Start: 2020-07-02

## 2020-07-02 RX ORDER — ACETAMINOPHEN 500 MG
3 TABLET ORAL
Qty: 180 | Refills: 0
Start: 2020-07-02 | End: 2020-07-31

## 2020-07-02 RX ORDER — LIDOCAINE 4 G/100G
1 CREAM TOPICAL
Qty: 0 | Refills: 0 | DISCHARGE
Start: 2020-07-02

## 2020-07-02 RX ORDER — AMLODIPINE BESYLATE 2.5 MG/1
1 TABLET ORAL
Qty: 30 | Refills: 0
Start: 2020-07-02 | End: 2020-07-31

## 2020-07-02 RX ORDER — POTASSIUM CHLORIDE 20 MEQ
0 PACKET (EA) ORAL
Qty: 0 | Refills: 0 | DISCHARGE

## 2020-07-02 RX ADMIN — Medication 20 MILLIGRAM(S): at 05:30

## 2020-07-02 RX ADMIN — ENOXAPARIN SODIUM 40 MILLIGRAM(S): 100 INJECTION SUBCUTANEOUS at 10:35

## 2020-07-02 RX ADMIN — Medication 1 TABLET(S): at 12:41

## 2020-07-02 RX ADMIN — AMLODIPINE BESYLATE 5 MILLIGRAM(S): 2.5 TABLET ORAL at 05:30

## 2020-07-02 RX ADMIN — LIDOCAINE 1 PATCH: 4 CREAM TOPICAL at 09:00

## 2020-07-02 NOTE — PROGRESS NOTE ADULT - PROBLEM SELECTOR PROBLEM 7
Spinal stenosis
Discharge planning issues
Spinal stenosis

## 2020-07-02 NOTE — PROGRESS NOTE ADULT - PROBLEM SELECTOR PLAN 4
TTE 2019 mild diastolic dysfunction. BNP elevated 1535 while previously in 600s. Pitting edema bilaterally. Lungs clear.  -resumed asix 20mg daily tomorrow

## 2020-07-02 NOTE — PROGRESS NOTE ADULT - ATTENDING COMMENTS
dc planning to [facility] today. have auth.  pts daughter Evy at bedside. discussed with her  dc planning time 50 mins
[facility]  [phone]
[facility]  [phone]
[facility]  [phone]
begin dc planning to [facility]  need to call and update daughter on 6/30
begin dc planning to [facility]  pending auth
dc planning to [facility]  pending auth

## 2020-07-02 NOTE — PROGRESS NOTE ADULT - PROBLEM SELECTOR PLAN 8
DVT: lovenox  Diet: DASH  Dispo: PT-[facility]    Transitions of Care Status:  1.  Name of PCP:  2.  PCP Contacted on Admission: [ ] Y    [ ] N    3.  PCP contacted at Discharge: [ ] Y    [ ] N    [ ] N/A  4.  Post-Discharge Appointment Date and Location:  5.  Summary of Handoff given to PCP:

## 2020-07-02 NOTE — DISCHARGE NOTE NURSING/CASE MANAGEMENT/SOCIAL WORK - PATIENT PORTAL LINK FT
You can access the FollowMyHealth Patient Portal offered by [facility] by registering at the following website: [email or web address]. By joining [facility]’s FollowMyHealth portal, you will also be able to view your health information using other applications (apps) compatible with our system.

## 2020-07-02 NOTE — PROGRESS NOTE ADULT - PROBLEM SELECTOR PROBLEM 4
(HFpEF) heart failure with preserved ejection fraction

## 2020-07-02 NOTE — PROGRESS NOTE ADULT - PROBLEM SELECTOR PROBLEM 5
HTN (hypertension)
CAD (coronary artery disease)
HTN (hypertension)

## 2020-07-02 NOTE — PROGRESS NOTE ADULT - REASON FOR ADMISSION
fall, L shoulder pain
syncope
fall, L shoulder pain

## 2020-07-02 NOTE — PROGRESS NOTE ADULT - PROBLEM SELECTOR PLAN 1
recurrent unwitnessed fall with recent hospitalization for fall in May 2020.   Likely mechanical but possibly due to orthostatic hypotension vs vasovagal vs cardiac vs neurologic  - delta troponin flat, EKG showing bifascicular block (unchanged from prior)  -TTE without segmental wall motion abn ef 75%.   -check orthostatics   -TSH, folate, B12 wnl   -monitor on telemetry- if no arrhythmia will d/c
recurrent unwitnessed fall with recent hospitalization for fall in May 2020.   Likely mechanical but possibly due to orthostatic hypotension vs vasovagal vs cardiac vs neurologic  - delta troponin flat, EKG showing bifascicular block (unchanged from prior)  -TTE without segmental wall motion abn ef 75%.   no arrythmia on telemetry - d/c tele  CT head negative
recurrent unwitnessed fall with recent hospitalization for fall in May 2020.   Likely mechanical but possibly due to orthostatic hypotension vs vasovagal vs cardiac vs neurologic  - delta troponin flat, EKG showing bifascicular block (unchanged from prior)  -TTE without segmental wall motion abn ef 75%.   no arrythmia on telemetry - d/c tele  CT head negative
recurrent unwitnessed fall with recent hospitalization for fall in May 2020.   Likely mechanical but possibly due to orthostatic hypotension vs vasovagal vs cardiac vs neurologic  - delta troponin flat, EKG showing bifascicular block (unchanged from prior)  -TTE without segmental wall motion abn ef 75%.   no arrythmia on telemetry - d/c tele  CT head negative  -OOB, PT  -[facility] planning
recurrent unwitnessed fall with recent hospitalization for fall in May 2020.   Likely mechanical but possibly due to orthostatic hypotension vs vasovagal vs cardiac vs neurologic  - delta troponin flat, EKG showing bifascicular block (unchanged from prior)  -TTE without segmental wall motion abn ef 75%.   no arrythmia on telemetry - d/c tele  CT head negative  -orthostatics  -OOB, PT  -[facility] planning
recurrent unwitnessed fall with recent hospitalization for fall in May 2020.   Likely mechanical but possibly due to orthostatic hypotension vs vasovagal vs cardiac vs neurologic  - delta troponin flat, EKG showing bifascicular block (unchanged from prior)  -TTE without segmental wall motion abn ef 75%.   no arrythmia on telemetry - d/c tele  CT head negative  -orthostatics  -OOB, PT  -[facility] planning
recurrent unwitnessed fall with recent hospitalization for fall in May 2020.   Likely mechanical but possibly due to orthostatic hypotension vs vasovagal vs cardiac vs neurologic  - delta troponin flat, EKG showing bifascicular block (unchanged from prior)  -TTE without segmental wall motion abn ef 75%.   no arrythmia on telemetry - d/c tele  CT head negative  -OOB, PT  -[facility] planning

## 2020-07-02 NOTE — PROGRESS NOTE ADULT - PROBLEM SELECTOR PLAN 3
initial CK 1058 now improved. Likely from fall. Unclear length of time patient was down. s/p 500 IVF bolus  -encourage po
initial CK 1058 now improved. Likely from fall. Unclear length of time patient was down. s/p 500 IVF bolus  -hold off fluids given hx of HFpEF and mild volume overload on exam   - encourage PO intake
initial CK 1058 now improved. Likely from fall. Unclear length of time patient was down. s/p 500 IVF bolus  -hold off fluids given hx of HFpEF and mild volume overload on exam   - encourage PO intake
initial CK 1058 now improved. Likely from fall. Unclear length of time patient was down. s/p 500 IVF bolus  -hold off fluids given hx of HFpEF and mild volume overload on exam, holding home lasix   - encourage PO intake
initial CK 1058 now improved. Likely from fall. Unclear length of time patient was down. s/p 500 IVF bolus  -encourage po

## 2020-07-02 NOTE — PROGRESS NOTE ADULT - PROBLEM SELECTOR PROBLEM 6
CAD (coronary artery disease)
Spinal stenosis

## 2020-07-02 NOTE — PROGRESS NOTE ADULT - PROBLEM SELECTOR PROBLEM 3
Rhabdomyolysis

## 2020-07-02 NOTE — PROGRESS NOTE ADULT - PROBLEM SELECTOR PLAN 5
will dc on lasix 20mg daily (home med) and stop norvasc
hx of bypass >20 years ago. Not on ASA due to GIB hx  -c/w atorvastatin 40mg
norvasc 5mg daily added  resume lasix 20mg daily  will dc likely on this
norvasc 5mg daily added  will dc likely on this
norvasc 5mg daily added  will dc likely on this

## 2020-07-02 NOTE — PROGRESS NOTE ADULT - PROBLEM SELECTOR PLAN 2
Xray: Severe glenohumeral arthrosis. Left shoulder dislocation s/p reductions but complicated with degloving injury  -Ortho f/u regarding ct shoulder findings of anterior dislocation and possible rotator cuff tear.   regarding subcapsular fracture of femor on xray - per ortho low suspicion for hip fx  -outpatient ortho follow up Dr. Smith  -NWB LUE, WBAT BLLE per Ortho  - pain control with tylenol for now; if needed, can add low dose opioids
Xray: Severe glenohumeral arthrosis. Left shoulder dislocation s/p reductions but complicated with degloving injury  -Ortho f/u regarding ct shoulder findings of anterior dislocation and possible rotator cuff tear.   regarding subcapsular fracture of femor on xray - per ortho low suspicion for hip fx  -outpatient ortho follow up Dr. Smith  -NWB LUE, WBAT BLLE per Ortho  - pain control with tylenol for now; if needed, can add low dose opioids
Xray: Severe glenohumeral arthrosis. Left shoulder dislocation s/p reductions but complicated with degloving injury  -Ortho f/u regarding ct shoulder findings of anterior dislocation and possible rotator cuff tear.   regarding subcapsular fracture of femor on xray - per ortho low suspicion for hip fx  -outpatient ortho follow up Dr. Smith  -NWFERNANDO LUE, WBAT BLLE per Ortho  - pain control with tylenol for now; if needed, can add low dose opioids  -fu plasics for wound care/ degloving injury
Xray: Severe glenohumeral arthrosis. Left shoulder dislocation s/p reductions but complicated with degloving injury  -Ortho recs appreciated  -outpatient ortho follow up Dr. Smith  -JESSICA JOHNSON BLLE  - pain control with tylenol for now; if needed, can add low dose opioids
Xray: Severe glenohumeral arthrosis. Left shoulder dislocation s/p reductions but complicated with degloving injury  -Ortho f/u regarding ct shoulder findings of anterior dislocation and possible rotator cuff tear.   regarding subcapsular fracture of femor on xray - per ortho low suspicion for hip fx  -outpatient ortho follow up Dr. Smith  -NWB LUE, WBAT BLLE per Ortho  - pain control with tylenol for now; if needed, can add low dose opioids  -appreciate wound care team recs- outpatient fu

## 2020-07-02 NOTE — PROGRESS NOTE ADULT - PROBLEM SELECTOR PLAN 6
hx of bypass >20 years ago. Not on ASA due to GIB hx  -c/w atorvastatin 40mg
Wears C collar at home  -pain control
hx of bypass >20 years ago. Not on ASA due to GIB hx  -c/w atorvastatin 40mg

## 2020-07-02 NOTE — PROGRESS NOTE ADULT - PROBLEM SELECTOR PLAN 7
Wears C collar at home  -pain control
DVT: lovenox  Diet: DASH  Dispo: PT OT eval    Transitions of Care Status:  1.  Name of PCP:  2.  PCP Contacted on Admission: [ ] Y    [ ] N    3.  PCP contacted at Discharge: [ ] Y    [ ] N    [ ] N/A  4.  Post-Discharge Appointment Date and Location:  5.  Summary of Handoff given to PCP:
DVT: lovenox  Diet: DASH  Dispo: PT OT eval    Transitions of Care Status:  1.  Name of PCP:  2.  PCP Contacted on Admission: [ ] Y    [ ] N    3.  PCP contacted at Discharge: [ ] Y    [ ] N    [ ] N/A  4.  Post-Discharge Appointment Date and Location:  5.  Summary of Handoff given to PCP:
DVT: lovenox  Diet: DASH  Dispo: PT-[facility]    Transitions of Care Status:  1.  Name of PCP:  2.  PCP Contacted on Admission: [ ] Y    [ ] N    3.  PCP contacted at Discharge: [ ] Y    [ ] N    [ ] N/A  4.  Post-Discharge Appointment Date and Location:  5.  Summary of Handoff given to PCP:
Wears C collar at home  -pain control

## 2020-07-02 NOTE — PROGRESS NOTE ADULT - SUBJECTIVE AND OBJECTIVE BOX
[facility] Division of Hospital Medicine  Cayla Bell MD  Pager (M-F, 8A-5P): [phone]  Other Times:  [phone]    Patient is a 89y old  Female who presents with a chief complaint of fall, L shoulder pain (01 Jul 2020 20:12)      SUBJECTIVE / OVERNIGHT EVENTS:    no overnight events- doing well today   this afternoon for [facility]    ADDITIONAL REVIEW OF SYSTEMS:    MEDICATIONS  (STANDING):  atorvastatin 40 milliGRAM(s) Oral at bedtime  calcium carbonate   1250 mG (OsCal) 1 Tablet(s) Oral daily  enoxaparin Injectable 40 milliGRAM(s) SubCutaneous daily  furosemide    Tablet 20 milliGRAM(s) Oral daily  lidocaine   Patch 1 Patch Transdermal every 24 hours  melatonin 5 milliGRAM(s) Oral at bedtime    MEDICATIONS  (PRN):  acetaminophen   Tablet .. 975 milliGRAM(s) Oral every 6 hours PRN Temp greater or equal to 38C (100.4F), Mild Pain (1 - 3)  ALBUTerol    90 MICROgram(s) HFA Inhaler 2 Puff(s) Inhalation every 6 hours PRN Shortness of Breath and/or Wheezing      CAPILLARY BLOOD GLUCOSE        I&O's Summary    01 Jul 2020 07:01  -  02 Jul 2020 07:00  --------------------------------------------------------  IN: 1020 mL / OUT: 951 mL / NET: 69 mL    02 Jul 2020 07:01  -  02 Jul 2020 13:08  --------------------------------------------------------  IN: 240 mL / OUT: 0 mL / NET: 240 mL        PHYSICAL EXAM:  Vital Signs Last 24 Hrs  T(C): 36.7 (02 Jul 2020 11:45), Max: 36.7 (02 Jul 2020 11:45)  T(F): 98 (02 Jul 2020 11:45), Max: 98 (02 Jul 2020 11:45)  HR: 70 (02 Jul 2020 11:45) (70 - 76)  BP: 148/72 (02 Jul 2020 11:45) (130/82 - 148/72)  BP(mean): --  RR: 18 (02 Jul 2020 11:45) (18 - 18)  SpO2: 97% (02 Jul 2020 11:45) (96% - 97%)  CONSTITUTIONAL: NAD, well-developed, well-groomed      LABS:                      RADIOLOGY & ADDITIONAL TESTS:  Results Reviewed:  yes  Imaging Personally Reviewed:  Electrocardiogram Personally Reviewed:    COORDINATION OF CARE:  Care Discussed with Consultants/Other Providers [Y/N]: yes  Prior or Outpatient Records Reviewed [Y/N]: yes

## 2020-07-02 NOTE — PROGRESS NOTE ADULT - PROVIDER SPECIALTY LIST ADULT
Hospitalist
Internal Medicine
Orthopedics
Plastic Surgery
Plastic Surgery
Hospitalist

## 2020-07-02 NOTE — PROGRESS NOTE ADULT - ASSESSMENT
89 y/o F PMHx R hip fracture s/p IMN (2019), CAD s/p CABGx3 >20 years ago (not on ASA due to hx of GIB), HFpEF,, mild intermittent asthma presents with unwitnessed fall with Left shoulder dislocation admitted for syncope workup s/p L shoulder reduction and degloving injury, complicated with rhabdo.
89 y/o F PMHx R hip fracture s/p IMN (2019), CAD s/p CABGx3 >20 years ago (not on ASA due to hx of GIB), HFpEF,, mild intermittent asthma presents with unwitnessed fall with Left shoulder dislocation admitted for syncope workup s/p L shoulder reduction and degloving injury, complicated with rhabdo
89 y/o F PMHx R hip fracture s/p IMN (2019), CAD s/p CABGx3 >20 years ago (not on ASA due to hx of GIB), HFpEF,, mild intermittent asthma presents with unwitnessed fall with Left shoulder dislocation admitted for syncope workup s/p L shoulder reduction and degloving injury, complicated with rhabdo.
A/P:  Patient is a 89y demented Female w/ grossly unstable left shoulder dislocation    -L shoulder in sling and maintained abducted w/ folded blankets.   -FU gunslinger brace - ordered   -FU xray left shoulder in gunslinger brace when delivered   -NWB LUE, WBAT BLLE  -Patient likely unable tolerate mri of left hip to rule out occult femoral neck fracture, however comparison w/ previous imaging grossly unchanged & CT pelvis does not demonstrate appreciable fx, low suspicion for hip fx at this time.  -Patient admitted to medicine - c/w management per primary team  -PSx consulted for degloving injury  -Multimodal Analgesia - recommend low dose opioids and acetaminophen as tolerated  -DVT ppx - per primary team  -PT/OT  -Ice and elevate as tolerated  -Recommend Ca & Vit D supplementation  -Recommend DEXA scan/Osteoporosis workup outpatient and treatment as needed  -Recommend follow up w/ outpatient endocrinologist   -No acute orthopaedic surgical intervention indicated at this time  -Orthopaedically stable   -Recommend follow up w/ Dr. Smith outpatient within 1 week. Call office to schedule appointment.  -All Patient's and Family Member's questions answered. Patient/family understand and agree w/ plan.  -Imaging and clinical presentation discussed w/ Dr. Smith who is aware and agrees w/ above plan.
Pt is a 89F with LUE forearm degloving episode, PRS consulted for local wound recommendations    - continue wound care with Xeroform, Janet; allow demarcation and any viable tissue to begin to heal  - no acute intervention indicated  - appreciate care per primary    #[phone] Plastic Surgery
Pt is a 89F with LUE forearm degloving episode, PRS consulted for local wound recommendations    - continue wound care with Xeroform, Janet; allow demarcation and any viable tissue to begin to heal  - no acute intervention indicated  - appreciate care per primary    #[phone] Plastic Surgery
89 y/o F PMHx R hip fracture s/p IMN (2019), CAD s/p CABGx3 >20 years ago (not on ASA due to hx of GIB), HFpEF,, mild intermittent asthma presents with unwitnessed fall with Left shoulder dislocation admitted for syncope workup s/p L shoulder reduction and degloving injury, complicated with rhabdo.

## 2020-11-22 NOTE — OCCUPATIONAL THERAPY INITIAL EVALUATION ADULT - VISUAL ASSESSMENT: SCANNING
Patient has ordered PTT to be drawn at 19:45 and has not yet to be drawn. Paged phlebotomy twice at 19:55, 20:45, and again now at 20:55. A phone call  Was made also to phlebotomy with no answer. Charge RN informed of events, patient is resting in bed and stable, still trying to get in touch with phlebotomy.    WFL

## 2021-01-12 ENCOUNTER — NON-APPOINTMENT (OUTPATIENT)
Age: 86
End: 2021-01-12

## 2021-04-13 NOTE — PHYSICAL THERAPY INITIAL EVALUATION ADULT - IMPAIRED TRANSFERS: SIT/STAND, REHAB EVAL
POST-OP DIAGNOSIS:  Torsion of ovary, ovarian pedicle and fallopian tube 13-Apr-2021 15:20:21  Lissy Anders  
decreased strength/impaired balance

## 2021-04-21 ENCOUNTER — APPOINTMENT (OUTPATIENT)
Dept: DERMATOLOGY | Facility: CLINIC | Age: 86
End: 2021-04-21

## 2021-04-21 ENCOUNTER — APPOINTMENT (OUTPATIENT)
Dept: DERMATOLOGY | Facility: CLINIC | Age: 86
End: 2021-04-21
Payer: MEDICARE

## 2021-04-21 PROCEDURE — 99214 OFFICE O/P EST MOD 30 MIN: CPT

## 2021-05-10 ENCOUNTER — APPOINTMENT (OUTPATIENT)
Dept: DERMATOLOGY | Facility: CLINIC | Age: 86
End: 2021-05-10
Payer: MEDICARE

## 2021-05-10 ENCOUNTER — LABORATORY RESULT (OUTPATIENT)
Age: 86
End: 2021-05-10

## 2021-05-10 DIAGNOSIS — D48.5 NEOPLASM OF UNCERTAIN BEHAVIOR OF SKIN: ICD-10-CM

## 2021-05-10 PROCEDURE — 11104 PUNCH BX SKIN SINGLE LESION: CPT

## 2021-05-10 PROCEDURE — 99214 OFFICE O/P EST MOD 30 MIN: CPT | Mod: 25

## 2021-05-21 ENCOUNTER — OUTPATIENT (OUTPATIENT)
Dept: OUTPATIENT SERVICES | Facility: HOSPITAL | Age: 86
LOS: 1 days | Discharge: HOME | End: 2021-05-21

## 2021-05-21 DIAGNOSIS — Z98.890 OTHER SPECIFIED POSTPROCEDURAL STATES: Chronic | ICD-10-CM

## 2021-05-26 ENCOUNTER — OUTPATIENT (OUTPATIENT)
Dept: OUTPATIENT SERVICES | Facility: HOSPITAL | Age: 86
LOS: 1 days | Discharge: HOME | End: 2021-05-26

## 2021-05-26 DIAGNOSIS — C44.92 SQUAMOUS CELL CARCINOMA OF SKIN, UNSPECIFIED: ICD-10-CM

## 2021-05-26 DIAGNOSIS — Z98.890 OTHER SPECIFIED POSTPROCEDURAL STATES: Chronic | ICD-10-CM

## 2021-05-27 ENCOUNTER — APPOINTMENT (OUTPATIENT)
Dept: RADIATION ONCOLOGY | Facility: CLINIC | Age: 86
End: 2021-05-27
Payer: MEDICARE

## 2021-05-27 ENCOUNTER — APPOINTMENT (OUTPATIENT)
Dept: HEMATOLOGY ONCOLOGY | Facility: CLINIC | Age: 86
End: 2021-05-27

## 2021-05-27 VITALS
TEMPERATURE: 36 F | DIASTOLIC BLOOD PRESSURE: 69 MMHG | SYSTOLIC BLOOD PRESSURE: 125 MMHG | BODY MASS INDEX: 21.09 KG/M2 | HEART RATE: 72 BPM | RESPIRATION RATE: 18 BRPM | OXYGEN SATURATION: 94 % | WEIGHT: 108 LBS

## 2021-05-27 PROCEDURE — 99203 OFFICE O/P NEW LOW 30 MIN: CPT | Mod: 25

## 2021-05-27 NOTE — VITALS
[Maximal Pain Intensity: 5/10] : 5/10 [Pain Description/Quality: ___] : Pain description/quality: [unfilled] [40: Disabled, requires special care and assistance.] : 40: Disabled, requires special care and assistance. [ECOG Performance Status: 4 - Completely disabled. Cannot carry on any self care. Totally confined to bed or chair] : Performance Status: 4 - Completely disabled. Cannot carry on any self care. Totally confined to bed or chair

## 2021-05-27 NOTE — REVIEW OF SYSTEMS
[Fatigue] : fatigue [Loss of Hearing] : loss of hearing [Shortness Of Breath] : shortness of breath [Cough] : cough [Gait Disturbance] : gait disturbance [Confused] : confusion [Difficulty Walking] : difficulty walking [Negative] : Genitourinary [Constipation: Grade 0] : Constipation: Grade 0 [Nausea: Grade 0] : Nausea: Grade 0 [Vomiting: Grade 0] : Vomiting: Grade 0 [Fatigue: Grade 0] : Fatigue: Grade 0 [Dyspnea: Grade 1 - Shortness of breath with moderate exertion] : Dyspnea: Grade 1 - Shortness of breath with moderate exertion

## 2021-06-10 ENCOUNTER — APPOINTMENT (OUTPATIENT)
Dept: HEMATOLOGY ONCOLOGY | Facility: CLINIC | Age: 86
End: 2021-06-10

## 2021-06-13 NOTE — ADDENDUM
[FreeTextEntry1] : 6/8/2021\par Discussed at Tumor Board. Has not yet seen Dr. Durbin.We are still considering immunotherapy but await formal evluation by Med Onc.

## 2021-06-13 NOTE — HISTORY OF PRESENT ILLNESS
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 Reconstruction was performed with a rotation flap. In Jan 2021, she noted a fixed hard growth on the right malar cheek at the site of prior surgery.  At the time, she refused biopsy. The lesion rapidly enlarged and was painful when pressed. She was evaluated by derm and is s/p right cheek biopsy on 5/10/21. Path noting invasive SCC extending to both lateral margins of the right cheek. \par \par Pt is unable to walk and gets around in wheelchair. Pt confused at times. Residing in [facility].Eating well.Pt has mass on right cheek. Biopsy done 2 weeks ago.

## 2021-06-13 NOTE — DISEASE MANAGEMENT
[Pathological] : TNM Stage: p [N/A] : Currently not applicable [FreeTextEntry4] : cutaeous squamous cell cancer rt cheek, recurrent [TTNM] : x [NTNM] : x [MTNM] : x

## 2021-08-07 NOTE — ED ADULT NURSE NOTE - NS ED NOTE  TALK SOMEONE YN
Diabetes Follow up note:    Chief complaint: T2DM    Interval Hx: BG low 100-mid 200s over past 24 hours. Pt seen at bedside eating lunch. Reports improved appetite and PO intake. Having intermittent abdominal discomfort. Pt asking when she will be able to go home.     Review of Systems:  General: as above.   GI: Tolerating POs. Denies N/V/D. + BM  CV: Denies CP/SOB  ENDO: No S&Sx of hypoglycemia    MEDS:  desmopressin 0.1 milliGRAM(s) Oral two times a day  insulin glargine Injectable (LANTUS) 8 Unit(s) SubCutaneous at bedtime  insulin lispro (ADMELOG) corrective regimen sliding scale   SubCutaneous three times a day before meals  insulin lispro (ADMELOG) corrective regimen sliding scale   SubCutaneous at bedtime  insulin lispro Injectable (ADMELOG) 3 Unit(s) SubCutaneous three times a day before meals      Allergies    No Known Allergies        PE:  General: Female lying in bed. NAD.   Vital Signs Last 24 Hrs  T(C): 36.8 (07 Aug 2021 12:02), Max: 36.8 (07 Aug 2021 04:54)  T(F): 98.2 (07 Aug 2021 12:02), Max: 98.2 (07 Aug 2021 04:54)  HR: 92 (07 Aug 2021 12:02) (92 - 95)  BP: 129/72 (07 Aug 2021 12:02) (106/62 - 136/73)  BP(mean): --  RR: 18 (07 Aug 2021 12:02) (18 - 18)  SpO2: 97% (07 Aug 2021 12:02) (97% - 100%)  Abd: Soft, NT,ND,   Extremities: Warm. no edema x 4 ext.   Neuro: A&O X3    LABS:  POCT Blood Glucose.: 246 mg/dL (08-07-21 @ 12:35)  POCT Blood Glucose.: 111 mg/dL (08-07-21 @ 08:32)  POCT Blood Glucose.: 218 mg/dL (08-06-21 @ 21:36)  POCT Blood Glucose.: 198 mg/dL (08-06-21 @ 17:59)  POCT Blood Glucose.: 280 mg/dL (08-06-21 @ 13:05)  POCT Blood Glucose.: 322 mg/dL (08-06-21 @ 09:16)  POCT Blood Glucose.: 263 mg/dL (08-05-21 @ 21:07)  POCT Blood Glucose.: 313 mg/dL (08-05-21 @ 17:17)  POCT Blood Glucose.: 346 mg/dL (08-05-21 @ 12:59)  POCT Blood Glucose.: 360 mg/dL (08-05-21 @ 09:15)  POCT Blood Glucose.: 362 mg/dL (08-04-21 @ 22:39)  POCT Blood Glucose.: 385 mg/dL (08-04-21 @ 21:05)  POCT Blood Glucose.: 290 mg/dL (08-04-21 @ 17:11)          08-06    143  |  112<H>  |  34<H>  ----------------------------<  297<H>  3.5   |  20<L>  |  1.52<H>    Ca    8.1<L>      06 Aug 2021 10:03        Thyroid Function Tests:  07-29 @ 19:56 TSH 2.45 FreeT4 -- T3 -- Anti TPO -- Anti Thyroglobulin Ab -- TSI --      A1C with Estimated Average Glucose Result: 6.5 % (07-28-21 @ 09:21)          Contact number: martin [phone] or [phone]       No

## 2021-11-10 NOTE — PROGRESS NOTE ADULT - PROVIDER SPECIALTY LIST ADULT
Gastroenterology What Type Of Note Output Would You Prefer (Optional)?: Standard Output How Severe Are Your Spot(S)?: mild Have Your Spot(S) Been Treated In The Past?: has not been treated Hpi Title: Evaluation of Skin Lesions

## 2022-10-24 ENCOUNTER — NON-APPOINTMENT (OUTPATIENT)
Age: 87
End: 2022-10-24

## 2022-11-21 NOTE — ED PROVIDER NOTE - NS_EDPROVIDERDISPOUSERTYPE_ED_A_ED
I have reviewed discharge instructions with the patient. The patient verbalized understanding. Patient left ED via Discharge Method: ambulatory to Home with self    Opportunity for questions and clarification provided. Patient given 1 scripts. To continue your aftercare when you leave the hospital, you may receive an automated call from our care team to check in on how you are doing. This is a free service and part of our promise to provide the best care and service to meet your aftercare needs.  If you have questions, or wish to unsubscribe from this service please call [phone]. Thank you for Choosing our [facility].          Zeina Quinn RN  11/21/22 8303 Attending Attestation (For Attendings USE Only)...

## 2025-01-15 NOTE — DIETITIAN INITIAL EVALUATION ADULT. - PROBLEM SELECTOR PLAN 3
4 = No assist / stand by assistance
initial CK 1058. Likely from fall. Unclear length of time patient was down. s/p 500 IVF bolus  -hold off fluids given hx of HFpEF and mild volume overload on exam   -trend CK AM. If uptrending, will give 500cc bolus  - encourage PO intake

## 2025-04-28 NOTE — ED ADULT NURSE NOTE - CAS DISCH ACCOMP BY
NOTIFICATION OF INPATIENT ADMISSION   AUTHORIZATION REQUEST   SERVICING FACILITY:   [place] [address]  Tax ID: [id number]  NPI: [id number] ATTENDING PROVIDER:  Attending Name and NPI#: Cornelio Delgado Do [[id number]]  Address: [address] [place] [address]  Phone: [phone]     ADMISSION INFORMATION:  Place of Service: Inpatient [facility]  Place of Service Code: 21  Inpatient Admission Date/Time: 4/26/25  3:05 PM  Discharge Date/Time: No discharge date for patient encounter.  Admitting Diagnosis Code/Description:  Bone lesion [M89.9]  Abdominal pain [R10.9]  Constipation [K59.00]  Rectal mass [K62.89]  Hepatic lesion [K76.9]     UTILIZATION REVIEW CONTACT:  Noreen Bermeo, Utilization   Network Utilization Review Department  Phone: [phone]  Fax [phone]  Email: [email or web address]  Contact for approvals/pending authorizations, clinical reviews, and discharge.     PHYSICIAN ADVISORY SERVICES:  Medical Necessity Denial & Zuou-hx-Gami Review  Phone: [phone]  Fax: [phone]  Email: [email or web address]     DISCHARGE SUPPORT TEAM:  For Patients Discharge Needs & Updates  Phone: [phone] opt. 2 Fax: [phone]  Email: [email or web address]      Self

## 2025-05-09 NOTE — PROGRESS NOTE ADULT - PROBLEM SELECTOR PROBLEM 5
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 Episode occurred on 5/8/2025 @ 5:55pm, episode lasted 12 beats. 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   EF-60% at rest on 3/27/2025.    Spinal stenosis

## 2025-05-16 NOTE — ED ADULT TRIAGE NOTE - HEART RATE (BEATS/MIN)
----- Message from Myriam Fung MD sent at 5/16/2025  7:36 AM CDT -----  Please call. NO vaginal infection that is causing discharge. We did discuss use of probiotics to change what lives in the vagina.  Lactobacillus acidophilus.   And/or boric acid vaginal suppositories - to drop pH of vagina.   
Kesha was last seen on 5/14 for vaginal discharge.      Left message for Kesha to return call to OB/GYN clinic, [address] #[phone].      
Patient returned call and informed of below results and recommendations per Dr. Fung.    I discussed use of Florajen and or Boric Acid suppositories.   Kesha is in agreement with plans and voices no further questions.    Encouraged to call with any additional questions or concerns. She verbalized understanding and appreciation for follow up.    Encounter closed.      
73